# Patient Record
Sex: FEMALE | Race: WHITE | Employment: FULL TIME | URBAN - METROPOLITAN AREA
[De-identification: names, ages, dates, MRNs, and addresses within clinical notes are randomized per-mention and may not be internally consistent; named-entity substitution may affect disease eponyms.]

---

## 2017-09-20 PROBLEM — G89.4 CHRONIC PAIN SYNDROME: Chronic | Status: ACTIVE | Noted: 2017-09-20

## 2018-01-15 PROBLEM — D64.9 ANEMIA: Status: ACTIVE | Noted: 2018-01-15

## 2018-01-15 PROBLEM — R63.6 UNDERWEIGHT: Status: ACTIVE | Noted: 2018-01-15

## 2018-01-15 PROBLEM — A41.9 SEPSIS (HCC): Status: ACTIVE | Noted: 2018-01-15

## 2018-01-15 PROBLEM — F17.210 DEPENDENCE ON NICOTINE FROM CIGARETTES: Chronic | Status: ACTIVE | Noted: 2018-01-15

## 2018-01-15 PROBLEM — J18.1 LOBAR PNEUMONIA (HCC): Status: ACTIVE | Noted: 2018-01-15

## 2018-01-17 PROBLEM — J18.1 LOBAR PNEUMONIA (HCC): Status: RESOLVED | Noted: 2018-01-15 | Resolved: 2018-01-17

## 2018-01-17 PROBLEM — A41.9 SEPSIS (HCC): Status: RESOLVED | Noted: 2018-01-15 | Resolved: 2018-01-17

## 2018-01-18 PROBLEM — J96.01 ACUTE RESPIRATORY FAILURE WITH HYPOXIA (HCC): Status: ACTIVE | Noted: 2018-01-18

## 2018-01-26 PROBLEM — J45.909 ASTHMA: Status: ACTIVE | Noted: 2018-01-26

## 2018-03-13 ENCOUNTER — INITIAL CONSULT (OUTPATIENT)
Dept: CARDIOTHORACIC SURGERY | Age: 58
End: 2018-03-13
Payer: COMMERCIAL

## 2018-03-13 VITALS
BODY MASS INDEX: 18.44 KG/M2 | DIASTOLIC BLOOD PRESSURE: 74 MMHG | HEIGHT: 64 IN | WEIGHT: 108 LBS | HEART RATE: 102 BPM | SYSTOLIC BLOOD PRESSURE: 116 MMHG

## 2018-03-13 DIAGNOSIS — J18.1 LUNG CONSOLIDATION (HCC): Primary | ICD-10-CM

## 2018-03-13 DIAGNOSIS — J18.9 PNEUMONIA OF RIGHT UPPER LOBE DUE TO INFECTIOUS ORGANISM: ICD-10-CM

## 2018-03-13 PROCEDURE — 99203 OFFICE O/P NEW LOW 30 MIN: CPT | Performed by: THORACIC SURGERY (CARDIOTHORACIC VASCULAR SURGERY)

## 2018-03-13 NOTE — PROGRESS NOTES
Subjective:      Patient ID: Mike Nascimento is a 66 y.o. female. Chief Complaint   Patient presents with    Consultation     referral dr Osiris Sarah pulmonary nodule CT Chest 2/9 HPI   Ms. Karey Dawkins presents to the office today upon referral from Dr. Osiris Sarah for pulmonary nodule. Her PMH is significant for asthma as well as a history of cigarette smoking. She also had a recent diagnosis of MRSA pneumonia in January. CT of the chest done on 2/9/18 revealed a new area of consolidation involving the posterior lateral  aspect of the right upper lobe extending to the pleural surface measuring at least 44 x 33 mm in axial dimensions. She currently denies any significant SOB and reports improvement in sputum production. She does continue to report some fatigue. Denies any CP. Review of Systems   Constitutional: Positive for fatigue. Negative for chills, fever and unexpected weight change. Respiratory: Positive for cough and shortness of breath. Negative for chest tightness. Cardiovascular: Negative for chest pain. Neurological: Negative for dizziness, syncope and light-headedness. Objective:   Physical Exam   Constitutional: She is oriented to person, place, and time. She appears well-developed. No distress. Cardiovascular: Normal rate. Pulmonary/Chest: Effort normal. No respiratory distress. Abdominal: Soft. Bowel sounds are normal.   Musculoskeletal: Normal range of motion. Neurological: She is alert and oriented to person, place, and time. Skin: Skin is warm and dry. Psychiatric: She has a normal mood and affect. Assessment:      Pneumonia, consolidation      Plan:      Her CT in January shows nothing in the RUL and the CT one month later shows a 3x4cm \"mass\" which is more consolidation than anything. Most recent CXR looks almost normal.  It has been another month so will repeat CT to assure this consolidation has resolved as it likely has.   She wishes to establish with a new

## 2018-03-14 DIAGNOSIS — R91.1 PULMONARY NODULE: Primary | ICD-10-CM

## 2018-03-14 ASSESSMENT — ENCOUNTER SYMPTOMS
CHEST TIGHTNESS: 0
SHORTNESS OF BREATH: 1
COUGH: 1

## 2018-03-15 DIAGNOSIS — J15.212 PNEUMONIA OF RIGHT LUNG DUE TO METHICILLIN RESISTANT STAPHYLOCOCCUS AUREUS (MRSA), UNSPECIFIED PART OF LUNG (HCC): Primary | ICD-10-CM

## 2018-03-21 ENCOUNTER — HOSPITAL ENCOUNTER (OUTPATIENT)
Dept: CT IMAGING | Age: 58
Discharge: HOME | End: 2018-03-21
Payer: COMMERCIAL

## 2018-03-21 DIAGNOSIS — R91.1 PULMONARY NODULE: ICD-10-CM

## 2018-03-21 PROCEDURE — 71250 CT THORAX DX C-: CPT

## 2018-03-26 ENCOUNTER — TELEPHONE (OUTPATIENT)
Dept: CARDIOTHORACIC SURGERY | Age: 58
End: 2018-03-26

## 2018-03-28 ENCOUNTER — OFFICE VISIT (OUTPATIENT)
Dept: INTERNAL MEDICINE CLINIC | Age: 58
End: 2018-03-28
Payer: COMMERCIAL

## 2018-03-28 VITALS
OXYGEN SATURATION: 98 % | SYSTOLIC BLOOD PRESSURE: 139 MMHG | WEIGHT: 109.6 LBS | BODY MASS INDEX: 18.71 KG/M2 | HEART RATE: 81 BPM | HEIGHT: 64 IN | DIASTOLIC BLOOD PRESSURE: 69 MMHG

## 2018-03-28 DIAGNOSIS — R03.0 ELEVATED BP WITHOUT DIAGNOSIS OF HYPERTENSION: ICD-10-CM

## 2018-03-28 DIAGNOSIS — Z12.4 CERVICAL CANCER SCREENING: Primary | ICD-10-CM

## 2018-03-28 DIAGNOSIS — Z12.39 BREAST CANCER SCREENING: ICD-10-CM

## 2018-03-28 DIAGNOSIS — J43.1 PANLOBULAR EMPHYSEMA (HCC): ICD-10-CM

## 2018-03-28 DIAGNOSIS — Z13.1 DIABETES MELLITUS SCREENING: ICD-10-CM

## 2018-03-28 DIAGNOSIS — Z12.11 COLON CANCER SCREENING: ICD-10-CM

## 2018-03-28 DIAGNOSIS — J44.1 COPD EXACERBATION (HCC): ICD-10-CM

## 2018-03-28 PROCEDURE — 99212 OFFICE O/P EST SF 10 MIN: CPT | Performed by: FAMILY MEDICINE

## 2018-03-28 PROCEDURE — 99213 OFFICE O/P EST LOW 20 MIN: CPT | Performed by: FAMILY MEDICINE

## 2018-03-28 RX ORDER — PREDNISONE 10 MG/1
10 TABLET ORAL DAILY
COMMUNITY
End: 2018-05-09 | Stop reason: ALTCHOICE

## 2018-03-28 RX ORDER — VENLAFAXINE 37.5 MG/1
37.5 TABLET ORAL 3 TIMES DAILY
COMMUNITY
End: 2018-05-03 | Stop reason: SDUPTHER

## 2018-03-28 ASSESSMENT — ENCOUNTER SYMPTOMS
SORE THROAT: 0
TROUBLE SWALLOWING: 0
BACK PAIN: 0
EYE PAIN: 0
CHEST TIGHTNESS: 1
BLOOD IN STOOL: 0
PHOTOPHOBIA: 0
COUGH: 1
NAUSEA: 0
CONSTIPATION: 0
ABDOMINAL DISTENTION: 0
SHORTNESS OF BREATH: 1
ABDOMINAL PAIN: 0
DIARRHEA: 0
EYE DISCHARGE: 0
RHINORRHEA: 0
CHOKING: 0
VOMITING: 0

## 2018-03-28 NOTE — PROGRESS NOTES
Subjective:  66 y.o. female former smoker who presents in office today for follow-up of MRSA pneumonia and lung nodule. Patient relates that for the last 2 weeks her shortness of breath worsens when lying down and she was started on Prednisone yesterday. Patient follows with Dr. Abdirahman Rodriguez, Pulmonology and has a follow-up appointment scheduled on 04/10/2018. Patient states that she completed the full course of Doxycycline. Patient consulted with Dr. Kristian Bazzi on 03/13/2018 and a repeat CT Chest was ordered (results below). Patient  relates that her cough has improved; sometimes productive with clear mucus. CT Chest 03/21/2018  Impression   1.  Interval enlargement of a nodule in the left upper lobe now   measuring 6 mm. This may be due to slice selection. Short interval   follow-up recommended. This is likely too small to characterize on   PET/CT. 2. Some clearing and decrease conspicuity of bandlike opacities in the   lung apices, likely scarring. Patient agrees to complete Mammogram and Colonoscopy. Patient has a pre-existing relationship with Dr. Arline Browning, OB-GYN. Review of Systems   Constitutional: Positive for appetite change (improved). Negative for activity change, chills, fever and unexpected weight change. HENT: Negative for congestion, ear pain, hearing loss, rhinorrhea, sore throat and trouble swallowing. Eyes: Negative for photophobia, pain, discharge and visual disturbance. Respiratory: Positive for cough (improving; now clear mucus occasionally), chest tightness, shortness of breath (gradually improving) and wheezing (intermittent). Negative for choking. Cardiovascular: Negative for chest pain, palpitations and leg swelling. Gastrointestinal: Negative for abdominal distention, abdominal pain, blood in stool, constipation, diarrhea, nausea and vomiting. Endocrine: Negative for cold intolerance, heat intolerance, polydipsia and polyuria.    Genitourinary: Negative for decreased urine volume, dysfunction of the cervical, thoracic, or lumbar spine. Lab Results    Lab Results   Component Value Date    WBC 3.9 (L) 03/06/2018    HGB 12.0 03/06/2018    HCT 37.7 03/06/2018     03/06/2018    CHOL 113 01/19/2018    TRIG 93 01/19/2018    HDL 31 01/19/2018    ALT 25 03/06/2018    AST 29 03/06/2018     03/06/2018    K 4.6 03/06/2018     03/06/2018    CREATININE 0.7 03/06/2018    BUN 16 03/06/2018    CO2 27 03/06/2018    TSH 1.400 01/19/2018    INR 1.0 01/18/2018    GLUF 117 (H) 07/14/2017    LABA1C 5.0 01/19/2018    LABMICR <12.0 07/14/2017     Lab Results   Component Value Date    COLORU Yellow 01/16/2018    NITRU Negative 01/16/2018    GLUCOSEU Negative 01/16/2018    KETUA Negative 01/16/2018    UROBILINOGEN 0.2 01/16/2018    BILIRUBINUR Negative 01/16/2018     No results found for: PSA, CEA, , [id number],   Assessment and Plan:  Kobe Hurtado was seen today for shortness of breath. Diagnoses and all orders for this visit:    Cervical cancer screening  -     External Referral To Ob-Gyn, Dr. Mac Beverly    Breast cancer screening  -     MARSHAL DIGITAL SCREEN W CAD BILATERAL; Future    Colon cancer screening  -     External Referral To Gastroenterology    Lung nodule < 6cm on CT        -     Follows with Pulmonology, Dr. Abdirahman Rodriguez; next angélica't 04/10/2018    Panlobular emphysema ([facility] [address]  -     CBC; Future    Elevated BP without diagnosis of hypertension  -     Comprehensive Metabolic Panel; Future  -     TSH without Reflex; Future    Diabetes mellitus screening  -     Hemoglobin A1C; Future      Return in about 6 weeks (around 5/9/2018) for Mammo, Labs & Colonoscopy. .    Patient may come in sooner if needed for medical concerns. Patient advised to call at any time to cancel or re-schedule or for any questions/concerns. Patient was seen and discussed with attending physician, Dr. Sirisha Noriega.   Renan Locke DO PGY2 [facility]  03/28/18  4:41 PM

## 2018-03-29 ASSESSMENT — ENCOUNTER SYMPTOMS: WHEEZING: 1

## 2018-04-28 ENCOUNTER — HOSPITAL ENCOUNTER (OUTPATIENT)
Dept: MAMMOGRAPHY | Age: 58
Discharge: HOME | End: 2018-04-30
Payer: COMMERCIAL

## 2018-04-28 DIAGNOSIS — Z12.39 BREAST CANCER SCREENING: ICD-10-CM

## 2018-04-28 PROCEDURE — 77067 SCR MAMMO BI INCL CAD: CPT

## 2018-05-02 DIAGNOSIS — R92.8 ABNORMAL MAMMOGRAM OF LEFT BREAST: Primary | ICD-10-CM

## 2018-05-04 RX ORDER — VENLAFAXINE 37.5 MG/1
37.5 TABLET ORAL 3 TIMES DAILY
Qty: 90 TABLET | Refills: 0 | Status: SHIPPED | OUTPATIENT
Start: 2018-05-04 | End: 2018-05-04 | Stop reason: CLARIF

## 2018-05-08 ENCOUNTER — HOSPITAL ENCOUNTER (OUTPATIENT)
Age: 58
Discharge: HOME | End: 2018-05-08
Payer: COMMERCIAL

## 2018-05-08 ENCOUNTER — HOSPITAL ENCOUNTER (OUTPATIENT)
Dept: MAMMOGRAPHY | Age: 58
Discharge: HOME | End: 2018-05-10
Payer: COMMERCIAL

## 2018-05-08 ENCOUNTER — HOSPITAL ENCOUNTER (OUTPATIENT)
Dept: ULTRASOUND IMAGING | Age: 58
End: 2018-05-08
Payer: COMMERCIAL

## 2018-05-08 DIAGNOSIS — Z13.1 DIABETES MELLITUS SCREENING: ICD-10-CM

## 2018-05-08 DIAGNOSIS — R92.8 ABNORMAL MAMMOGRAM OF LEFT BREAST: ICD-10-CM

## 2018-05-08 DIAGNOSIS — J43.1 PANLOBULAR EMPHYSEMA (HCC): ICD-10-CM

## 2018-05-08 DIAGNOSIS — R03.0 ELEVATED BP WITHOUT DIAGNOSIS OF HYPERTENSION: ICD-10-CM

## 2018-05-08 LAB
ALBUMIN SERPL-MCNC: 4.5 G/DL (ref 3.5–5.2)
ALP BLD-CCNC: 84 U/L (ref 35–104)
ALT SERPL-CCNC: 29 U/L (ref 0–32)
ANION GAP SERPL CALCULATED.3IONS-SCNC: 13 MMOL/L (ref 7–16)
AST SERPL-CCNC: 28 U/L (ref 0–31)
BILIRUB SERPL-MCNC: <0.2 MG/DL (ref 0–1.2)
BUN BLDV-MCNC: 17 MG/DL (ref 6–20)
CALCIUM SERPL-MCNC: 9.8 MG/DL (ref 8.6–10.2)
CHLORIDE BLD-SCNC: 102 MMOL/L (ref 98–107)
CO2: 26 MMOL/L (ref 22–29)
CREAT SERPL-MCNC: 0.8 MG/DL (ref 0.5–1)
GFR AFRICAN AMERICAN: >60
GFR NON-AFRICAN AMERICAN: >60 ML/MIN/1.73
GLUCOSE BLD-MCNC: 93 MG/DL (ref 74–109)
HBA1C MFR BLD: 5.1 % (ref 4.8–5.9)
HCT VFR BLD CALC: 39.6 % (ref 34–48)
HEMOGLOBIN: 13.1 G/DL (ref 11.5–15.5)
MCH RBC QN AUTO: 31.7 PG (ref 26–35)
MCHC RBC AUTO-ENTMCNC: 33.1 % (ref 32–34.5)
MCV RBC AUTO: 95.9 FL (ref 80–99.9)
PDW BLD-RTO: 12.8 FL (ref 11.5–15)
PLATELET # BLD: 247 E9/L (ref 130–450)
PMV BLD AUTO: 9.5 FL (ref 7–12)
POTASSIUM SERPL-SCNC: 5 MMOL/L (ref 3.5–5)
RBC # BLD: 4.13 E12/L (ref 3.5–5.5)
SODIUM BLD-SCNC: 141 MMOL/L (ref 132–146)
TOTAL PROTEIN: 7.2 G/DL (ref 6.4–8.3)
TSH SERPL DL<=0.05 MIU/L-ACNC: 3.96 UIU/ML (ref 0.27–4.2)
WBC # BLD: 3.9 E9/L (ref 4.5–11.5)

## 2018-05-08 PROCEDURE — 80053 COMPREHEN METABOLIC PANEL: CPT

## 2018-05-08 PROCEDURE — 36415 COLL VENOUS BLD VENIPUNCTURE: CPT

## 2018-05-08 PROCEDURE — 83036 HEMOGLOBIN GLYCOSYLATED A1C: CPT

## 2018-05-08 PROCEDURE — 84443 ASSAY THYROID STIM HORMONE: CPT

## 2018-05-08 PROCEDURE — 77065 DX MAMMO INCL CAD UNI: CPT

## 2018-05-08 PROCEDURE — 85027 COMPLETE CBC AUTOMATED: CPT

## 2018-05-09 ENCOUNTER — OFFICE VISIT (OUTPATIENT)
Dept: INTERNAL MEDICINE CLINIC | Age: 58
End: 2018-05-09
Payer: COMMERCIAL

## 2018-05-09 VITALS
HEART RATE: 113 BPM | BODY MASS INDEX: 17.93 KG/M2 | WEIGHT: 105 LBS | DIASTOLIC BLOOD PRESSURE: 83 MMHG | OXYGEN SATURATION: 100 % | SYSTOLIC BLOOD PRESSURE: 126 MMHG | HEIGHT: 64 IN

## 2018-05-09 DIAGNOSIS — F41.9 ANXIETY: ICD-10-CM

## 2018-05-09 DIAGNOSIS — E78.5 HYPERLIPIDEMIA, UNSPECIFIED HYPERLIPIDEMIA TYPE: ICD-10-CM

## 2018-05-09 DIAGNOSIS — H66.002 ACUTE SUPPURATIVE OTITIS MEDIA OF LEFT EAR WITHOUT SPONTANEOUS RUPTURE OF TYMPANIC MEMBRANE, RECURRENCE NOT SPECIFIED: Primary | ICD-10-CM

## 2018-05-09 PROCEDURE — 99212 OFFICE O/P EST SF 10 MIN: CPT | Performed by: FAMILY MEDICINE

## 2018-05-09 PROCEDURE — 99213 OFFICE O/P EST LOW 20 MIN: CPT | Performed by: FAMILY MEDICINE

## 2018-05-09 RX ORDER — TIOTROPIUM BROMIDE INHALATION SPRAY 3.12 UG/1
SPRAY, METERED RESPIRATORY (INHALATION)
Refills: 5 | COMMUNITY
Start: 2018-04-11

## 2018-05-09 RX ORDER — AMOXICILLIN 500 MG/1
500 CAPSULE ORAL 3 TIMES DAILY
Qty: 30 CAPSULE | Refills: 0 | Status: SHIPPED | OUTPATIENT
Start: 2018-05-09 | End: 2018-05-19

## 2018-05-09 RX ORDER — ATORVASTATIN CALCIUM 10 MG/1
10 TABLET, FILM COATED ORAL DAILY
Qty: 30 TABLET | Refills: 2 | Status: SHIPPED | OUTPATIENT
Start: 2018-05-09 | End: 2018-08-08 | Stop reason: SDUPTHER

## 2018-05-09 RX ORDER — VENLAFAXINE HYDROCHLORIDE 37.5 MG/1
CAPSULE, EXTENDED RELEASE ORAL
Qty: 30 CAPSULE | Refills: 3 | Status: SHIPPED | OUTPATIENT
Start: 2018-05-09 | End: 2018-09-22 | Stop reason: SDUPTHER

## 2018-05-16 ASSESSMENT — ENCOUNTER SYMPTOMS
ABDOMINAL DISTENTION: 0
BACK PAIN: 0
SORE THROAT: 0
CHOKING: 0
DIARRHEA: 0
TROUBLE SWALLOWING: 0
CONSTIPATION: 0
EYE PAIN: 0
WHEEZING: 0
RHINORRHEA: 0
BLOOD IN STOOL: 0
COUGH: 0
EYE DISCHARGE: 0
SHORTNESS OF BREATH: 0
PHOTOPHOBIA: 0
ABDOMINAL PAIN: 0
NAUSEA: 0
VOMITING: 0
CHEST TIGHTNESS: 0

## 2018-05-22 DIAGNOSIS — J40 BRONCHITIS WITH TRACHEITIS: ICD-10-CM

## 2018-06-04 ENCOUNTER — HOSPITAL ENCOUNTER (OUTPATIENT)
Age: 58
Discharge: HOME | End: 2018-06-04
Payer: COMMERCIAL

## 2018-06-04 ENCOUNTER — HOSPITAL ENCOUNTER (OUTPATIENT)
Dept: MAMMOGRAPHY | Age: 58
Discharge: HOME | End: 2018-06-06
Payer: COMMERCIAL

## 2018-06-04 DIAGNOSIS — F41.9 ANXIETY: ICD-10-CM

## 2018-06-04 DIAGNOSIS — E78.5 HYPERLIPIDEMIA, UNSPECIFIED HYPERLIPIDEMIA TYPE: ICD-10-CM

## 2018-06-04 DIAGNOSIS — Z13.820 ENCOUNTER FOR IMAGING TO ASSESS OSTEOPOROSIS: ICD-10-CM

## 2018-06-04 LAB
ALBUMIN SERPL-MCNC: 4.3 G/DL (ref 3.5–5.2)
ALP BLD-CCNC: 86 U/L (ref 35–104)
ALT SERPL-CCNC: 26 U/L (ref 0–32)
ANION GAP SERPL CALCULATED.3IONS-SCNC: 15 MMOL/L (ref 7–16)
AST SERPL-CCNC: 26 U/L (ref 0–31)
BILIRUB SERPL-MCNC: 0.2 MG/DL (ref 0–1.2)
BUN BLDV-MCNC: 19 MG/DL (ref 6–20)
CALCIUM SERPL-MCNC: 9.4 MG/DL (ref 8.6–10.2)
CHLORIDE BLD-SCNC: 103 MMOL/L (ref 98–107)
CO2: 24 MMOL/L (ref 22–29)
CREAT SERPL-MCNC: 0.8 MG/DL (ref 0.5–1)
GFR AFRICAN AMERICAN: >60
GFR NON-AFRICAN AMERICAN: >60 ML/MIN/1.73
GLUCOSE BLD-MCNC: 96 MG/DL (ref 74–109)
HCT VFR BLD CALC: 38.8 % (ref 34–48)
HEMOGLOBIN: 13 G/DL (ref 11.5–15.5)
MCH RBC QN AUTO: 31.4 PG (ref 26–35)
MCHC RBC AUTO-ENTMCNC: 33.5 % (ref 32–34.5)
MCV RBC AUTO: 93.7 FL (ref 80–99.9)
PDW BLD-RTO: 12.9 FL (ref 11.5–15)
PLATELET # BLD: 246 E9/L (ref 130–450)
PMV BLD AUTO: 9.1 FL (ref 7–12)
POTASSIUM SERPL-SCNC: 4.1 MMOL/L (ref 3.5–5)
RBC # BLD: 4.14 E12/L (ref 3.5–5.5)
SODIUM BLD-SCNC: 142 MMOL/L (ref 132–146)
TOTAL PROTEIN: 7.1 G/DL (ref 6.4–8.3)
TSH SERPL DL<=0.05 MIU/L-ACNC: 4.28 UIU/ML (ref 0.27–4.2)
WBC # BLD: 5.2 E9/L (ref 4.5–11.5)

## 2018-06-04 PROCEDURE — 84443 ASSAY THYROID STIM HORMONE: CPT

## 2018-06-04 PROCEDURE — 36415 COLL VENOUS BLD VENIPUNCTURE: CPT

## 2018-06-04 PROCEDURE — 85027 COMPLETE CBC AUTOMATED: CPT

## 2018-06-04 PROCEDURE — 77080 DXA BONE DENSITY AXIAL: CPT

## 2018-06-04 PROCEDURE — 80053 COMPREHEN METABOLIC PANEL: CPT

## 2018-06-13 DIAGNOSIS — J40 BRONCHITIS WITH TRACHEITIS: ICD-10-CM

## 2018-06-18 RX ORDER — FLUTICASONE FUROATE AND VILANTEROL TRIFENATATE 100; 25 UG/1; UG/1
POWDER RESPIRATORY (INHALATION)
Qty: 60 EACH | Refills: 2 | Status: SHIPPED | OUTPATIENT
Start: 2018-06-18 | End: 2018-09-19 | Stop reason: SDUPTHER

## 2018-08-08 DIAGNOSIS — E78.5 HYPERLIPIDEMIA, UNSPECIFIED HYPERLIPIDEMIA TYPE: ICD-10-CM

## 2018-08-08 RX ORDER — ATORVASTATIN CALCIUM 10 MG/1
TABLET, FILM COATED ORAL
Qty: 30 TABLET | Refills: 1 | Status: SHIPPED | OUTPATIENT
Start: 2018-08-08 | End: 2018-10-14 | Stop reason: SDUPTHER

## 2018-09-19 DIAGNOSIS — R79.89 ELEVATED TSH: ICD-10-CM

## 2018-09-19 DIAGNOSIS — M81.8 AGE-RELATED OSTEOPOROSIS WITHOUT FRACTURE: ICD-10-CM

## 2018-09-19 DIAGNOSIS — R63.6 UNDERWEIGHT: ICD-10-CM

## 2018-09-19 DIAGNOSIS — J40 BRONCHITIS WITH TRACHEITIS: ICD-10-CM

## 2018-09-21 RX ORDER — FLUTICASONE FUROATE AND VILANTEROL 100; 25 UG/1; UG/1
POWDER RESPIRATORY (INHALATION)
Qty: 60 EACH | Refills: 1 | Status: SHIPPED
Start: 2018-09-21 | End: 2020-08-06 | Stop reason: SDUPTHER

## 2018-09-21 NOTE — TELEPHONE ENCOUNTER
CT chest scheduled sept 27 2018 @ 345 arrival time is 315 at [facility]. Message was left on machine    Follow up with Dr Charlie Veras 10/22/2018 @ 330 to follow up on labs and CT.     Labs mailed with appointment card     Advised patient to call back with any issues

## 2018-09-22 DIAGNOSIS — J40 BRONCHITIS WITH TRACHEITIS: ICD-10-CM

## 2018-09-22 DIAGNOSIS — F41.9 ANXIETY: ICD-10-CM

## 2018-09-25 RX ORDER — VENLAFAXINE HYDROCHLORIDE 37.5 MG/1
CAPSULE, EXTENDED RELEASE ORAL
Qty: 30 CAPSULE | Refills: 2 | Status: SHIPPED | OUTPATIENT
Start: 2018-09-25 | End: 2018-12-25 | Stop reason: SDUPTHER

## 2018-09-27 ENCOUNTER — HOSPITAL ENCOUNTER (OUTPATIENT)
Age: 58
Discharge: HOME | End: 2018-09-27
Payer: COMMERCIAL

## 2018-09-27 ENCOUNTER — HOSPITAL ENCOUNTER (OUTPATIENT)
Dept: CT IMAGING | Age: 58
Discharge: HOME | End: 2018-09-27
Payer: COMMERCIAL

## 2018-09-27 DIAGNOSIS — R63.6 UNDERWEIGHT: ICD-10-CM

## 2018-09-27 DIAGNOSIS — R79.89 ELEVATED TSH: ICD-10-CM

## 2018-09-27 DIAGNOSIS — J40 BRONCHITIS WITH TRACHEITIS: ICD-10-CM

## 2018-09-27 DIAGNOSIS — M81.8 AGE-RELATED OSTEOPOROSIS WITHOUT FRACTURE: ICD-10-CM

## 2018-09-27 LAB
ALBUMIN SERPL-MCNC: 4.5 G/DL (ref 3.5–5.2)
ALP BLD-CCNC: 88 U/L (ref 35–104)
ALT SERPL-CCNC: 36 U/L (ref 0–32)
ANION GAP SERPL CALCULATED.3IONS-SCNC: 15 MMOL/L (ref 7–16)
AST SERPL-CCNC: 29 U/L (ref 0–31)
BILIRUB SERPL-MCNC: 0.2 MG/DL (ref 0–1.2)
BUN BLDV-MCNC: 18 MG/DL (ref 6–20)
CALCIUM SERPL-MCNC: 9.3 MG/DL (ref 8.6–10.2)
CHLORIDE BLD-SCNC: 104 MMOL/L (ref 98–107)
CO2: 24 MMOL/L (ref 22–29)
CREAT SERPL-MCNC: 0.7 MG/DL (ref 0.5–1)
GFR AFRICAN AMERICAN: >60
GFR NON-AFRICAN AMERICAN: >60 ML/MIN/1.73
GLUCOSE BLD-MCNC: 83 MG/DL (ref 74–109)
HBA1C MFR BLD: 5.1 % (ref 4–5.6)
HCT VFR BLD CALC: 38.2 % (ref 34–48)
HEMOGLOBIN: 12.3 G/DL (ref 11.5–15.5)
MCH RBC QN AUTO: 31.2 PG (ref 26–35)
MCHC RBC AUTO-ENTMCNC: 32.2 % (ref 32–34.5)
MCV RBC AUTO: 97 FL (ref 80–99.9)
PDW BLD-RTO: 12.3 FL (ref 11.5–15)
PLATELET # BLD: 221 E9/L (ref 130–450)
PMV BLD AUTO: 9.1 FL (ref 7–12)
POTASSIUM SERPL-SCNC: 3.9 MMOL/L (ref 3.5–5)
RBC # BLD: 3.94 E12/L (ref 3.5–5.5)
SODIUM BLD-SCNC: 143 MMOL/L (ref 132–146)
TOTAL PROTEIN: 7.1 G/DL (ref 6.4–8.3)
TSH SERPL DL<=0.05 MIU/L-ACNC: 3.91 UIU/ML (ref 0.27–4.2)
VITAMIN D 25-HYDROXY: 70 NG/ML (ref 30–100)
WBC # BLD: 4.8 E9/L (ref 4.5–11.5)

## 2018-09-27 PROCEDURE — 83036 HEMOGLOBIN GLYCOSYLATED A1C: CPT

## 2018-09-27 PROCEDURE — 85027 COMPLETE CBC AUTOMATED: CPT

## 2018-09-27 PROCEDURE — 36415 COLL VENOUS BLD VENIPUNCTURE: CPT

## 2018-09-27 PROCEDURE — 82306 VITAMIN D 25 HYDROXY: CPT

## 2018-09-27 PROCEDURE — 71250 CT THORAX DX C-: CPT

## 2018-09-27 PROCEDURE — 80053 COMPREHEN METABOLIC PANEL: CPT

## 2018-09-27 PROCEDURE — 84443 ASSAY THYROID STIM HORMONE: CPT

## 2018-10-14 DIAGNOSIS — E78.5 HYPERLIPIDEMIA, UNSPECIFIED HYPERLIPIDEMIA TYPE: ICD-10-CM

## 2018-10-16 RX ORDER — ATORVASTATIN CALCIUM 10 MG/1
TABLET, FILM COATED ORAL
Qty: 30 TABLET | Refills: 0 | Status: SHIPPED | OUTPATIENT
Start: 2018-10-16 | End: 2018-10-25 | Stop reason: SDUPTHER

## 2018-10-22 ENCOUNTER — OFFICE VISIT (OUTPATIENT)
Dept: INTERNAL MEDICINE CLINIC | Age: 58
End: 2018-10-22
Payer: COMMERCIAL

## 2018-10-22 VITALS
HEIGHT: 64 IN | HEART RATE: 104 BPM | WEIGHT: 118 LBS | BODY MASS INDEX: 20.14 KG/M2 | SYSTOLIC BLOOD PRESSURE: 123 MMHG | OXYGEN SATURATION: 97 % | TEMPERATURE: 98.1 F | DIASTOLIC BLOOD PRESSURE: 82 MMHG

## 2018-10-22 DIAGNOSIS — K57.30 DIVERTICULOSIS OF LARGE INTESTINE WITHOUT HEMORRHAGE: ICD-10-CM

## 2018-10-22 DIAGNOSIS — Z87.891 FORMER SMOKER: ICD-10-CM

## 2018-10-22 DIAGNOSIS — M81.0 AGE-RELATED OSTEOPOROSIS WITHOUT CURRENT PATHOLOGICAL FRACTURE: ICD-10-CM

## 2018-10-22 DIAGNOSIS — K64.8 INTERNAL HEMORRHOIDS WITHOUT COMPLICATION: ICD-10-CM

## 2018-10-22 DIAGNOSIS — K63.5 POLYP OF CECUM: ICD-10-CM

## 2018-10-22 DIAGNOSIS — K59.03 DRUG-INDUCED CONSTIPATION: ICD-10-CM

## 2018-10-22 DIAGNOSIS — Z23 PNEUMOCOCCAL VACCINE ADMINISTERED: ICD-10-CM

## 2018-10-22 DIAGNOSIS — K64.8 INTERNAL HEMORRHOIDS: ICD-10-CM

## 2018-10-22 DIAGNOSIS — R91.8 LUNG NODULES: Primary | ICD-10-CM

## 2018-10-22 DIAGNOSIS — R93.3 ABNORMAL COLONOSCOPY: ICD-10-CM

## 2018-10-22 PROCEDURE — 90732 PPSV23 VACC 2 YRS+ SUBQ/IM: CPT | Performed by: FAMILY MEDICINE

## 2018-10-22 PROCEDURE — 99213 OFFICE O/P EST LOW 20 MIN: CPT | Performed by: FAMILY MEDICINE

## 2018-10-22 RX ORDER — POLYETHYLENE GLYCOL 3350 17 G/17G
17 POWDER, FOR SOLUTION ORAL DAILY
Qty: 510 G | Refills: 2 | Status: SHIPPED
Start: 2018-10-22 | End: 2020-08-25 | Stop reason: ALTCHOICE

## 2018-10-22 ASSESSMENT — ENCOUNTER SYMPTOMS
PHOTOPHOBIA: 0
RHINORRHEA: 0
SORE THROAT: 0
VOMITING: 0
ABDOMINAL PAIN: 0
CHOKING: 0
DIARRHEA: 0
EYE PAIN: 0
ABDOMINAL DISTENTION: 0
TROUBLE SWALLOWING: 0
COUGH: 0
BLOOD IN STOOL: 0
BACK PAIN: 0
WHEEZING: 0
CONSTIPATION: 1
EYE DISCHARGE: 0
SHORTNESS OF BREATH: 0
NAUSEA: 0
CHEST TIGHTNESS: 0

## 2018-10-22 NOTE — PROGRESS NOTES
needed for medical concerns. Patient advised tocall at any time to cancel or re-schedule or for any questions/concerns. Patient was seen and discussed with Dr. aCs Jurado.     Hilton Hawthorne DO PGY2 [facility]  10/22/18  5:46 PM

## 2018-12-25 DIAGNOSIS — F41.9 ANXIETY: ICD-10-CM

## 2018-12-27 DIAGNOSIS — F41.9 ANXIETY: ICD-10-CM

## 2018-12-28 DIAGNOSIS — F41.9 ANXIETY: ICD-10-CM

## 2018-12-28 RX ORDER — VENLAFAXINE HYDROCHLORIDE 37.5 MG/1
CAPSULE, EXTENDED RELEASE ORAL
Qty: 30 CAPSULE | Refills: 1 | Status: SHIPPED | OUTPATIENT
Start: 2018-12-28 | End: 2018-12-28 | Stop reason: SDUPTHER

## 2018-12-28 RX ORDER — VENLAFAXINE HYDROCHLORIDE 37.5 MG/1
37.5 CAPSULE, EXTENDED RELEASE ORAL DAILY
Qty: 30 CAPSULE | Refills: 0 | Status: SHIPPED | OUTPATIENT
Start: 2018-12-28 | End: 2019-01-21 | Stop reason: SDUPTHER

## 2018-12-28 RX ORDER — VENLAFAXINE HYDROCHLORIDE 37.5 MG/1
37.5 CAPSULE, EXTENDED RELEASE ORAL DAILY
Qty: 30 CAPSULE | Refills: 2 | Status: CANCELLED | OUTPATIENT
Start: 2018-12-28

## 2019-01-18 ENCOUNTER — HOSPITAL ENCOUNTER (OUTPATIENT)
Age: 59
Discharge: HOME | End: 2019-01-18
Payer: COMMERCIAL

## 2019-01-18 DIAGNOSIS — M81.0 AGE-RELATED OSTEOPOROSIS WITHOUT CURRENT PATHOLOGICAL FRACTURE: ICD-10-CM

## 2019-01-18 DIAGNOSIS — K59.03 DRUG-INDUCED CONSTIPATION: ICD-10-CM

## 2019-01-18 DIAGNOSIS — K64.8 INTERNAL HEMORRHOIDS: ICD-10-CM

## 2019-01-18 LAB
ALBUMIN SERPL-MCNC: 4.5 G/DL (ref 3.5–5.2)
ALP BLD-CCNC: 78 U/L (ref 35–104)
ALT SERPL-CCNC: 46 U/L (ref 0–32)
ANION GAP SERPL CALCULATED.3IONS-SCNC: 13 MMOL/L (ref 7–16)
AST SERPL-CCNC: 38 U/L (ref 0–31)
BILIRUB SERPL-MCNC: 0.4 MG/DL (ref 0–1.2)
BUN BLDV-MCNC: 15 MG/DL (ref 6–20)
CALCIUM SERPL-MCNC: 9.4 MG/DL (ref 8.6–10.2)
CHLORIDE BLD-SCNC: 106 MMOL/L (ref 98–107)
CHOLESTEROL, TOTAL: 236 MG/DL (ref 0–199)
CO2: 25 MMOL/L (ref 22–29)
CREAT SERPL-MCNC: 0.9 MG/DL (ref 0.5–1)
GFR AFRICAN AMERICAN: >60
GFR NON-AFRICAN AMERICAN: >60 ML/MIN/1.73
GLUCOSE BLD-MCNC: 100 MG/DL (ref 74–99)
HCT VFR BLD CALC: 38.7 % (ref 34–48)
HDLC SERPL-MCNC: 41 MG/DL
HEMOGLOBIN: 12.9 G/DL (ref 11.5–15.5)
LDL CHOLESTEROL CALCULATED: 121 MG/DL (ref 0–99)
MCH RBC QN AUTO: 32.1 PG (ref 26–35)
MCHC RBC AUTO-ENTMCNC: 33.3 % (ref 32–34.5)
MCV RBC AUTO: 96.3 FL (ref 80–99.9)
PDW BLD-RTO: 12.2 FL (ref 11.5–15)
PLATELET # BLD: 214 E9/L (ref 130–450)
PMV BLD AUTO: 9.6 FL (ref 7–12)
POTASSIUM SERPL-SCNC: 4.5 MMOL/L (ref 3.5–5)
RBC # BLD: 4.02 E12/L (ref 3.5–5.5)
SODIUM BLD-SCNC: 144 MMOL/L (ref 132–146)
TOTAL PROTEIN: 7 G/DL (ref 6.4–8.3)
TRIGL SERPL-MCNC: 368 MG/DL (ref 0–149)
TSH SERPL DL<=0.05 MIU/L-ACNC: 3.2 UIU/ML (ref 0.27–4.2)
VITAMIN D 25-HYDROXY: 43 NG/ML (ref 30–100)
VLDLC SERPL CALC-MCNC: 74 MG/DL
WBC # BLD: 3.8 E9/L (ref 4.5–11.5)

## 2019-01-18 PROCEDURE — 82306 VITAMIN D 25 HYDROXY: CPT

## 2019-01-18 PROCEDURE — 80053 COMPREHEN METABOLIC PANEL: CPT

## 2019-01-18 PROCEDURE — 80061 LIPID PANEL: CPT

## 2019-01-18 PROCEDURE — 84443 ASSAY THYROID STIM HORMONE: CPT

## 2019-01-18 PROCEDURE — 85027 COMPLETE CBC AUTOMATED: CPT

## 2019-01-18 PROCEDURE — 36415 COLL VENOUS BLD VENIPUNCTURE: CPT

## 2019-01-21 ENCOUNTER — HOSPITAL ENCOUNTER (OUTPATIENT)
Age: 59
Discharge: HOME | End: 2019-01-23
Payer: COMMERCIAL

## 2019-01-21 ENCOUNTER — OFFICE VISIT (OUTPATIENT)
Dept: INTERNAL MEDICINE CLINIC | Age: 59
End: 2019-01-21
Payer: COMMERCIAL

## 2019-01-21 ENCOUNTER — HOSPITAL ENCOUNTER (OUTPATIENT)
Dept: GENERAL RADIOLOGY | Age: 59
Discharge: HOME | End: 2019-01-23
Payer: COMMERCIAL

## 2019-01-21 DIAGNOSIS — R10.13 EPIGASTRIC PAIN: ICD-10-CM

## 2019-01-21 DIAGNOSIS — E78.5 DYSLIPIDEMIA: ICD-10-CM

## 2019-01-21 DIAGNOSIS — R53.83 OTHER FATIGUE: ICD-10-CM

## 2019-01-21 DIAGNOSIS — R91.8 LUNG NODULES: Primary | ICD-10-CM

## 2019-01-21 DIAGNOSIS — M81.0 AGE-RELATED OSTEOPOROSIS WITHOUT CURRENT PATHOLOGICAL FRACTURE: ICD-10-CM

## 2019-01-21 DIAGNOSIS — R79.89 ELEVATED LFTS: ICD-10-CM

## 2019-01-21 DIAGNOSIS — R14.0 ABDOMINAL DISTENTION: ICD-10-CM

## 2019-01-21 DIAGNOSIS — D72.819 LEUKOPENIA, UNSPECIFIED TYPE: ICD-10-CM

## 2019-01-21 DIAGNOSIS — F41.9 ANXIETY: ICD-10-CM

## 2019-01-21 DIAGNOSIS — E78.5 HYPERLIPIDEMIA, UNSPECIFIED HYPERLIPIDEMIA TYPE: ICD-10-CM

## 2019-01-21 PROCEDURE — 99212 OFFICE O/P EST SF 10 MIN: CPT | Performed by: FAMILY MEDICINE

## 2019-01-21 PROCEDURE — 99213 OFFICE O/P EST LOW 20 MIN: CPT | Performed by: FAMILY MEDICINE

## 2019-01-21 PROCEDURE — 74018 RADEX ABDOMEN 1 VIEW: CPT

## 2019-01-21 RX ORDER — ATORVASTATIN CALCIUM 10 MG/1
TABLET, FILM COATED ORAL
Qty: 90 TABLET | Refills: 0 | Status: SHIPPED | OUTPATIENT
Start: 2019-01-21 | End: 2019-06-03 | Stop reason: SDUPTHER

## 2019-01-21 RX ORDER — VENLAFAXINE HYDROCHLORIDE 37.5 MG/1
37.5 CAPSULE, EXTENDED RELEASE ORAL DAILY
Qty: 30 CAPSULE | Refills: 2 | Status: SHIPPED | OUTPATIENT
Start: 2019-01-21 | End: 2019-05-02 | Stop reason: SDUPTHER

## 2019-01-21 ASSESSMENT — ENCOUNTER SYMPTOMS
SHORTNESS OF BREATH: 0
DIARRHEA: 0
EYE PAIN: 0
TROUBLE SWALLOWING: 0
ABDOMINAL PAIN: 1
CHOKING: 0
CHEST TIGHTNESS: 0
NAUSEA: 0
PHOTOPHOBIA: 0
BACK PAIN: 0
COUGH: 0
CONSTIPATION: 0
SORE THROAT: 0
WHEEZING: 0
EYE DISCHARGE: 0
VOMITING: 0
ABDOMINAL DISTENTION: 1
BLOOD IN STOOL: 0
RHINORRHEA: 0

## 2019-01-21 ASSESSMENT — PATIENT HEALTH QUESTIONNAIRE - PHQ9
SUM OF ALL RESPONSES TO PHQ QUESTIONS 1-9: 0
SUM OF ALL RESPONSES TO PHQ QUESTIONS 1-9: 0
SUM OF ALL RESPONSES TO PHQ9 QUESTIONS 1 & 2: 0
2. FEELING DOWN, DEPRESSED OR HOPELESS: 0
1. LITTLE INTEREST OR PLEASURE IN DOING THINGS: 0

## 2019-01-25 ENCOUNTER — HOSPITAL ENCOUNTER (OUTPATIENT)
Dept: CT IMAGING | Age: 59
Discharge: HOME | End: 2019-01-25
Payer: COMMERCIAL

## 2019-01-25 ENCOUNTER — HOSPITAL ENCOUNTER (OUTPATIENT)
Age: 59
Discharge: HOME | End: 2019-01-25
Payer: COMMERCIAL

## 2019-01-25 ENCOUNTER — TELEPHONE (OUTPATIENT)
Dept: INTERNAL MEDICINE CLINIC | Age: 59
End: 2019-01-25

## 2019-01-25 DIAGNOSIS — R79.89 ELEVATED LFTS: ICD-10-CM

## 2019-01-25 DIAGNOSIS — R10.13 EPIGASTRIC PAIN: ICD-10-CM

## 2019-01-25 DIAGNOSIS — R14.0 ABDOMINAL DISTENTION: ICD-10-CM

## 2019-01-25 LAB
ALBUMIN SERPL-MCNC: 4.5 G/DL (ref 3.5–5.2)
ALP BLD-CCNC: 70 U/L (ref 35–104)
ALT SERPL-CCNC: 34 U/L (ref 0–32)
AMYLASE: 67 U/L (ref 20–100)
ANION GAP SERPL CALCULATED.3IONS-SCNC: 13 MMOL/L (ref 7–16)
AST SERPL-CCNC: 28 U/L (ref 0–31)
BASOPHILS ABSOLUTE: 0.04 E9/L (ref 0–0.2)
BASOPHILS RELATIVE PERCENT: 1 % (ref 0–2)
BILIRUB SERPL-MCNC: 0.3 MG/DL (ref 0–1.2)
BUN BLDV-MCNC: 17 MG/DL (ref 6–20)
CALCIUM SERPL-MCNC: 9.1 MG/DL (ref 8.6–10.2)
CHLORIDE BLD-SCNC: 103 MMOL/L (ref 98–107)
CO2: 25 MMOL/L (ref 22–29)
CREAT SERPL-MCNC: 0.8 MG/DL (ref 0.5–1)
EOSINOPHILS ABSOLUTE: 0.16 E9/L (ref 0.05–0.5)
EOSINOPHILS RELATIVE PERCENT: 4.1 % (ref 0–6)
GFR AFRICAN AMERICAN: >60
GFR NON-AFRICAN AMERICAN: >60 ML/MIN/1.73
GLUCOSE BLD-MCNC: 86 MG/DL (ref 74–99)
HCT VFR BLD CALC: 33.9 % (ref 34–48)
HEMOGLOBIN: 11.3 G/DL (ref 11.5–15.5)
IMMATURE GRANULOCYTES #: 0.01 E9/L
IMMATURE GRANULOCYTES %: 0.3 % (ref 0–5)
LIPASE: 21 U/L (ref 13–60)
LYMPHOCYTES ABSOLUTE: 1.39 E9/L (ref 1.5–4)
LYMPHOCYTES RELATIVE PERCENT: 35.2 % (ref 20–42)
MCH RBC QN AUTO: 32.7 PG (ref 26–35)
MCHC RBC AUTO-ENTMCNC: 33.3 % (ref 32–34.5)
MCV RBC AUTO: 98 FL (ref 80–99.9)
MONOCYTES ABSOLUTE: 0.37 E9/L (ref 0.1–0.95)
MONOCYTES RELATIVE PERCENT: 9.4 % (ref 2–12)
NEUTROPHILS ABSOLUTE: 1.98 E9/L (ref 1.8–7.3)
NEUTROPHILS RELATIVE PERCENT: 50 % (ref 43–80)
PDW BLD-RTO: 12.5 FL (ref 11.5–15)
PLATELET # BLD: 187 E9/L (ref 130–450)
PMV BLD AUTO: 9.1 FL (ref 7–12)
POTASSIUM SERPL-SCNC: 4.2 MMOL/L (ref 3.5–5)
RBC # BLD: 3.46 E12/L (ref 3.5–5.5)
SODIUM BLD-SCNC: 141 MMOL/L (ref 132–146)
TOTAL PROTEIN: 6.8 G/DL (ref 6.4–8.3)
WBC # BLD: 4 E9/L (ref 4.5–11.5)

## 2019-01-25 PROCEDURE — 36415 COLL VENOUS BLD VENIPUNCTURE: CPT

## 2019-01-25 PROCEDURE — 82150 ASSAY OF AMYLASE: CPT

## 2019-01-25 PROCEDURE — 80053 COMPREHEN METABOLIC PANEL: CPT

## 2019-01-25 PROCEDURE — 74178 CT ABD&PLV WO CNTR FLWD CNTR: CPT

## 2019-01-25 PROCEDURE — 6360000004 HC RX CONTRAST MEDICATION: Performed by: RADIOLOGY

## 2019-01-25 PROCEDURE — 85025 COMPLETE CBC W/AUTO DIFF WBC: CPT

## 2019-01-25 PROCEDURE — 83690 ASSAY OF LIPASE: CPT

## 2019-01-25 RX ADMIN — IOPAMIDOL 80 ML: 755 INJECTION, SOLUTION INTRAVENOUS at 15:28

## 2019-01-28 VITALS
DIASTOLIC BLOOD PRESSURE: 84 MMHG | OXYGEN SATURATION: 98 % | HEIGHT: 64 IN | HEART RATE: 90 BPM | TEMPERATURE: 97.5 F | SYSTOLIC BLOOD PRESSURE: 144 MMHG | WEIGHT: 123 LBS | BODY MASS INDEX: 21 KG/M2

## 2019-02-04 ENCOUNTER — OFFICE VISIT (OUTPATIENT)
Dept: INTERNAL MEDICINE CLINIC | Age: 59
End: 2019-02-04
Payer: COMMERCIAL

## 2019-02-04 VITALS
WEIGHT: 121.6 LBS | DIASTOLIC BLOOD PRESSURE: 78 MMHG | SYSTOLIC BLOOD PRESSURE: 115 MMHG | BODY MASS INDEX: 20.76 KG/M2 | HEIGHT: 64 IN | OXYGEN SATURATION: 94 % | HEART RATE: 99 BPM | TEMPERATURE: 97.8 F

## 2019-02-04 DIAGNOSIS — D72.819 LEUKOPENIA, UNSPECIFIED TYPE: ICD-10-CM

## 2019-02-04 DIAGNOSIS — D64.9 MILD ANEMIA: ICD-10-CM

## 2019-02-04 DIAGNOSIS — Z71.2 ENCOUNTER TO DISCUSS TEST RESULTS: Primary | ICD-10-CM

## 2019-02-04 PROBLEM — R89.9 ABNORMAL LABORATORY TEST RESULT: Status: ACTIVE | Noted: 2019-02-04

## 2019-02-04 PROCEDURE — 99213 OFFICE O/P EST LOW 20 MIN: CPT | Performed by: FAMILY MEDICINE

## 2019-02-04 PROCEDURE — 99212 OFFICE O/P EST SF 10 MIN: CPT | Performed by: FAMILY MEDICINE

## 2019-02-04 ASSESSMENT — ENCOUNTER SYMPTOMS
WHEEZING: 0
ABDOMINAL DISTENTION: 0
ABDOMINAL PAIN: 0
COUGH: 0
VOMITING: 0
BLOOD IN STOOL: 0
SHORTNESS OF BREATH: 0
PHOTOPHOBIA: 0
NAUSEA: 0
CHOKING: 0
TROUBLE SWALLOWING: 0
EYE DISCHARGE: 0
DIARRHEA: 0
EYE PAIN: 0
SORE THROAT: 0
BACK PAIN: 0
CONSTIPATION: 0
RHINORRHEA: 0
CHEST TIGHTNESS: 0

## 2019-03-29 ENCOUNTER — HOSPITAL ENCOUNTER (OUTPATIENT)
Dept: CT IMAGING | Age: 59
Discharge: HOME | End: 2019-03-29
Payer: COMMERCIAL

## 2019-03-29 DIAGNOSIS — Z87.891 FORMER SMOKER: ICD-10-CM

## 2019-03-29 DIAGNOSIS — R91.8 LUNG NODULES: ICD-10-CM

## 2019-03-29 PROCEDURE — 71250 CT THORAX DX C-: CPT

## 2019-04-05 ENCOUNTER — TELEPHONE (OUTPATIENT)
Dept: INTERNAL MEDICINE CLINIC | Age: 59
End: 2019-04-05

## 2019-04-05 ENCOUNTER — HOSPITAL ENCOUNTER (EMERGENCY)
Age: 59
Discharge: HOME | End: 2019-04-05
Payer: COMMERCIAL

## 2019-04-05 ENCOUNTER — HOSPITAL ENCOUNTER (EMERGENCY)
Age: 59
Discharge: HOME | End: 2019-04-05
Attending: EMERGENCY MEDICINE
Payer: COMMERCIAL

## 2019-04-05 ENCOUNTER — APPOINTMENT (OUTPATIENT)
Dept: CT IMAGING | Age: 59
End: 2019-04-05
Payer: COMMERCIAL

## 2019-04-05 VITALS
RESPIRATION RATE: 20 BRPM | OXYGEN SATURATION: 98 % | WEIGHT: 124.06 LBS | HEART RATE: 74 BPM | DIASTOLIC BLOOD PRESSURE: 84 MMHG | HEIGHT: 64 IN | BODY MASS INDEX: 21.18 KG/M2 | TEMPERATURE: 97.8 F | SYSTOLIC BLOOD PRESSURE: 143 MMHG

## 2019-04-05 VITALS
SYSTOLIC BLOOD PRESSURE: 153 MMHG | BODY MASS INDEX: 20.6 KG/M2 | WEIGHT: 120 LBS | OXYGEN SATURATION: 100 % | RESPIRATION RATE: 18 BRPM | HEART RATE: 76 BPM | DIASTOLIC BLOOD PRESSURE: 90 MMHG | TEMPERATURE: 98 F

## 2019-04-05 DIAGNOSIS — R11.0 NAUSEA: ICD-10-CM

## 2019-04-05 DIAGNOSIS — H93.12 TINNITUS OF LEFT EAR: Primary | ICD-10-CM

## 2019-04-05 DIAGNOSIS — R42 DISEQUILIBRIUM: ICD-10-CM

## 2019-04-05 DIAGNOSIS — H83.09 LABYRINTHITIS, UNSPECIFIED LATERALITY: Primary | ICD-10-CM

## 2019-04-05 LAB
ALBUMIN SERPL-MCNC: 4.7 G/DL (ref 3.5–5.2)
ALP BLD-CCNC: 87 U/L (ref 35–104)
ALT SERPL-CCNC: 40 U/L (ref 0–32)
ANION GAP SERPL CALCULATED.3IONS-SCNC: 11 MMOL/L (ref 7–16)
AST SERPL-CCNC: 33 U/L (ref 0–31)
BACTERIA: NORMAL /HPF
BASOPHILS ABSOLUTE: 0.05 E9/L (ref 0–0.2)
BASOPHILS RELATIVE PERCENT: 1.1 % (ref 0–2)
BILIRUB SERPL-MCNC: 0.3 MG/DL (ref 0–1.2)
BILIRUBIN URINE: NEGATIVE
BLOOD, URINE: NEGATIVE
BUN BLDV-MCNC: 18 MG/DL (ref 6–20)
CALCIUM SERPL-MCNC: 10.9 MG/DL (ref 8.6–10.2)
CHLORIDE BLD-SCNC: 106 MMOL/L (ref 98–107)
CLARITY: CLEAR
CO2: 26 MMOL/L (ref 22–29)
COLOR: YELLOW
CREAT SERPL-MCNC: 0.8 MG/DL (ref 0.5–1)
EOSINOPHILS ABSOLUTE: 0.15 E9/L (ref 0.05–0.5)
EOSINOPHILS RELATIVE PERCENT: 3.2 % (ref 0–6)
EPITHELIAL CELLS, UA: NORMAL /HPF
GFR AFRICAN AMERICAN: >60
GFR NON-AFRICAN AMERICAN: >60 ML/MIN/1.73
GLUCOSE BLD-MCNC: 113 MG/DL (ref 74–99)
GLUCOSE URINE: NEGATIVE MG/DL
HCT VFR BLD CALC: 39.4 % (ref 34–48)
HEMOGLOBIN: 13 G/DL (ref 11.5–15.5)
IMMATURE GRANULOCYTES #: 0.01 E9/L
IMMATURE GRANULOCYTES %: 0.2 % (ref 0–5)
KETONES, URINE: NEGATIVE MG/DL
LEUKOCYTE ESTERASE, URINE: ABNORMAL
LYMPHOCYTES ABSOLUTE: 1.6 E9/L (ref 1.5–4)
LYMPHOCYTES RELATIVE PERCENT: 34.6 % (ref 20–42)
MCH RBC QN AUTO: 32 PG (ref 26–35)
MCHC RBC AUTO-ENTMCNC: 33 % (ref 32–34.5)
MCV RBC AUTO: 97 FL (ref 80–99.9)
MONOCYTES ABSOLUTE: 0.36 E9/L (ref 0.1–0.95)
MONOCYTES RELATIVE PERCENT: 7.8 % (ref 2–12)
NEUTROPHILS ABSOLUTE: 2.46 E9/L (ref 1.8–7.3)
NEUTROPHILS RELATIVE PERCENT: 53.1 % (ref 43–80)
NITRITE, URINE: NEGATIVE
PDW BLD-RTO: 12 FL (ref 11.5–15)
PH UA: 5.5 (ref 5–9)
PLATELET # BLD: 227 E9/L (ref 130–450)
PMV BLD AUTO: 9.5 FL (ref 7–12)
POTASSIUM SERPL-SCNC: 4.4 MMOL/L (ref 3.5–5)
PROTEIN UA: NEGATIVE MG/DL
RBC # BLD: 4.06 E12/L (ref 3.5–5.5)
RBC UA: NORMAL /HPF (ref 0–2)
SODIUM BLD-SCNC: 143 MMOL/L (ref 132–146)
SPECIFIC GRAVITY UA: <=1.005 (ref 1–1.03)
TOTAL PROTEIN: 7.3 G/DL (ref 6.4–8.3)
TROPONIN: <0.01 NG/ML (ref 0–0.03)
UROBILINOGEN, URINE: 0.2 E.U./DL
WBC # BLD: 4.6 E9/L (ref 4.5–11.5)
WBC UA: NORMAL /HPF (ref 0–5)

## 2019-04-05 PROCEDURE — 99284 EMERGENCY DEPT VISIT MOD MDM: CPT

## 2019-04-05 PROCEDURE — 96374 THER/PROPH/DIAG INJ IV PUSH: CPT

## 2019-04-05 PROCEDURE — 36415 COLL VENOUS BLD VENIPUNCTURE: CPT

## 2019-04-05 PROCEDURE — 6360000002 HC RX W HCPCS: Performed by: PHYSICIAN ASSISTANT

## 2019-04-05 PROCEDURE — 6370000000 HC RX 637 (ALT 250 FOR IP): Performed by: PHYSICIAN ASSISTANT

## 2019-04-05 PROCEDURE — 84484 ASSAY OF TROPONIN QUANT: CPT

## 2019-04-05 PROCEDURE — 6370000000 HC RX 637 (ALT 250 FOR IP): Performed by: NURSE PRACTITIONER

## 2019-04-05 PROCEDURE — 85025 COMPLETE CBC W/AUTO DIFF WBC: CPT

## 2019-04-05 PROCEDURE — 99212 OFFICE O/P EST SF 10 MIN: CPT

## 2019-04-05 PROCEDURE — 93005 ELECTROCARDIOGRAM TRACING: CPT | Performed by: PHYSICIAN ASSISTANT

## 2019-04-05 PROCEDURE — 80053 COMPREHEN METABOLIC PANEL: CPT

## 2019-04-05 PROCEDURE — 70450 CT HEAD/BRAIN W/O DYE: CPT

## 2019-04-05 PROCEDURE — 81001 URINALYSIS AUTO W/SCOPE: CPT

## 2019-04-05 RX ORDER — MECLIZINE HCL 12.5 MG/1
50 TABLET ORAL ONCE
Status: COMPLETED | OUTPATIENT
Start: 2019-04-05 | End: 2019-04-05

## 2019-04-05 RX ORDER — MECLIZINE HYDROCHLORIDE 25 MG/1
25 TABLET ORAL 3 TIMES DAILY PRN
Qty: 15 TABLET | Refills: 0 | Status: SHIPPED | OUTPATIENT
Start: 2019-04-05 | End: 2019-04-10

## 2019-04-05 RX ORDER — PREDNISONE 10 MG/1
40 TABLET ORAL DAILY
Qty: 20 TABLET | Refills: 0 | Status: SHIPPED | OUTPATIENT
Start: 2019-04-05 | End: 2019-04-10

## 2019-04-05 RX ORDER — ONDANSETRON 4 MG/1
4 TABLET, ORALLY DISINTEGRATING ORAL ONCE
Status: COMPLETED | OUTPATIENT
Start: 2019-04-05 | End: 2019-04-05

## 2019-04-05 RX ORDER — ONDANSETRON 2 MG/ML
4 INJECTION INTRAMUSCULAR; INTRAVENOUS ONCE
Status: COMPLETED | OUTPATIENT
Start: 2019-04-05 | End: 2019-04-05

## 2019-04-05 RX ADMIN — ONDANSETRON 4 MG: 4 TABLET, ORALLY DISINTEGRATING ORAL at 11:43

## 2019-04-05 RX ADMIN — MECLIZINE 50 MG: 12.5 TABLET ORAL at 13:25

## 2019-04-05 RX ADMIN — ONDANSETRON 4 MG: 2 INJECTION INTRAMUSCULAR; INTRAVENOUS at 13:24

## 2019-04-05 NOTE — TELEPHONE ENCOUNTER
Patient phoned stating that she wasn't feeling good and wanted to make appointment for today. Informed patient that Dr Mariah Bear was not in today and that we are booked up for today as Dr Troy Barillas is totally full and Dr Sharad Jewell will not be seeing patients this afternoon as has to leave. (Son has broken leg.)  Patient voiced understanding and will go to urgent care.

## 2019-04-05 NOTE — ED NOTES
The patient says she does not feel as dizzy as when she arrived. Informed of need for urine specimen.      David Valdez RN  04/05/19 2334

## 2019-04-05 NOTE — ED PROVIDER NOTES
ED Attending  CC: Monica     Department of Emergency Medicine   ED  Provider Note  Admit Date/RoomTime: 2019 12:38 PM  ED Room:    Chief Complaint   Dizziness (dizzy c/o nausea since yesterday )    History of Present Illness   Source of history provided by:  patient. History/Exam Limitations: none. Radha Krishna is a 64 y.o. old female who has a past medical history of:   Past Medical History:   Diagnosis Date    Asthma     Chronic pain     Chronic rhinitis     CRPS (complex regional pain syndrome), lower limb     left foot    Pneumonia     RSD lower limb     left foot    Tinnitus     presents to the emergency department by private vehicle, for complaints of gradual onset dizziness which began 1 day(s) prior to arrival.  Since recognized her symptoms have been persistent. She has associated symptoms of nausea. No vomiting. No diarrhea. She denies chest pain or SOB. She has never had anything like this before. No fevers or chills. Patient states that she had some nasal congestion for the past several days. No coughing. Patient states she was taking a decongestant but didn't take any today. ROS    Pertinent positives and negatives are stated within HPI, all other systems reviewed and are negative. Past Surgical History:  has a past surgical history that includes  section (, ); Tubal ligation (); Breast surgery (); Hemorrhoid surgery (); Bunionectomy (); Esophagus surgery (); Endometrial ablation (); Nerve Block (); Nerve Block (12); Nerve Block (2012); Nerve Block (10-01-12); Nerve Block (10-10-12); Nerve Block (10/24/12); Nerve Block (12); Nerve Block (12); Nerve Block (Left, 14); Nerve Block (14); Nerve Block (Left, 14); Nerve Block (Left, 2014); Nerve Block (Left, 14); Nerve Block (Left, 10 8 14); Nerve Block (N/A, 2016); Nerve Block (2016);  Nerve Block (Right, 2016); Nerve Block (Right, 08/10/2016); Nerve Block (Right, 08/17/2016); Nerve Block (Right, 08/24/2016); and other surgical history (03/27/2017). Social History:  reports that she quit smoking about 2 years ago. Her smoking use included cigarettes. She has a 15.00 pack-year smoking history. She has never used smokeless tobacco. She reports that she drinks about 1.2 oz of alcohol per week. She reports that she does not use drugs. Family History: family history includes Cancer in her father; Hypertension in her mother; Kidney Disease in her mother. Allergies: Patient has no known allergies. Physical Exam           ED Triage Vitals [04/05/19 1234]   BP Temp Temp Source Pulse Resp SpO2 Height Weight   (!) 160/70 97.8 °F (36.6 °C) Oral 74 14 98 % 5' 4\" (1.626 m) 124 lb 1 oz (56.3 kg)      Oxygen Saturation Interpretation: Normal.    Constitutional:  Level of Consciousness: Awake and alert. ETOH: No.         Distress: none,  cooperative. Eyes:  PERRL, EOMI, no discharge or conjunctival injection. Ears:  External ears without lesions. Patient has small air fluid levels on b/l TMs. Patient has no erythema or sign of TM perforation b/l   Throat:  Pharynx without injection, exudate, or tonsillar hypertrophy. Airway patient. Neck:  Normal ROM. Supple. Respiratory:  Clear to auscultation and breath sounds equal.  CV:  Regular rate and rhythm, normal heart sounds, without pathological murmurs, ectopy, gallops, or rubs. GI:  Abdomen Soft, nontender, good bowel sounds. No firm or pulsatile mass. Back:  No costovertebral tenderness. Integument:  Normal turgor. Warm, dry, without visible rash, unless noted elsewhere. Lymphatic: no lymphadenopathy noted  Neurological:  Oriented. Motor functions intact. Patient has normal finger to nose testing. Patient able to ambulate w/o difficulty. Patient has left lateral horizontal nystagmus. Patient has 5/5  strength b/l. 5/5 strength in the lower extremities b/l. Negative pronator drift.      Lab / Imaging Results   (All laboratory and radiology results have been personally reviewed by myself)  Labs:  Results for orders placed or performed during the hospital encounter of 04/05/19   CBC Auto Differential   Result Value Ref Range    WBC 4.6 4.5 - 11.5 E9/L    RBC 4.06 3.50 - 5.50 E12/L    Hemoglobin 13.0 11.5 - 15.5 g/dL    Hematocrit 39.4 34.0 - 48.0 %    MCV 97.0 80.0 - 99.9 fL    MCH 32.0 26.0 - 35.0 pg    MCHC 33.0 32.0 - 34.5 %    RDW 12.0 11.5 - 15.0 fL    Platelets 920 738 - 202 E9/L    MPV 9.5 7.0 - 12.0 fL    Neutrophils % 53.1 43.0 - 80.0 %    Immature Granulocytes % 0.2 0.0 - 5.0 %    Lymphocytes % 34.6 20.0 - 42.0 %    Monocytes % 7.8 2.0 - 12.0 %    Eosinophils % 3.2 0.0 - 6.0 %    Basophils % 1.1 0.0 - 2.0 %    Neutrophils # 2.46 1.80 - 7.30 E9/L    Immature Granulocytes # 0.01 E9/L    Lymphocytes # 1.60 1.50 - 4.00 E9/L    Monocytes # 0.36 0.10 - 0.95 E9/L    Eosinophils # 0.15 0.05 - 0.50 E9/L    Basophils # 0.05 0.00 - 0.20 E9/L   Comprehensive Metabolic Panel   Result Value Ref Range    Sodium 143 132 - 146 mmol/L    Potassium 4.4 3.5 - 5.0 mmol/L    Chloride 106 98 - 107 mmol/L    CO2 26 22 - 29 mmol/L    Anion Gap 11 7 - 16 mmol/L    Glucose 113 (H) 74 - 99 mg/dL    BUN 18 6 - 20 mg/dL    CREATININE 0.8 0.5 - 1.0 mg/dL    GFR Non-African American >60 >=60 mL/min/1.73    GFR African American >60     Calcium 10.9 (H) 8.6 - 10.2 mg/dL    Total Protein 7.3 6.4 - 8.3 g/dL    Alb 4.7 3.5 - 5.2 g/dL    Total Bilirubin 0.3 0.0 - 1.2 mg/dL    Alkaline Phosphatase 87 35 - 104 U/L    ALT 40 (H) 0 - 32 U/L    AST 33 (H) 0 - 31 U/L   Troponin   Result Value Ref Range    Troponin <0.01 0.00 - 0.03 ng/mL   Urinalysis   Result Value Ref Range    Color, UA Yellow Straw/Yellow    Clarity, UA Clear Clear    Glucose, Ur Negative Negative mg/dL    Bilirubin Urine Negative Negative    Ketones, Urine Negative Negative mg/dL    Specific Gravity, UA <=1.005 1.005 - 1.030    Blood, Urine Negative Negative    pH, UA 5.5 5.0 - 9.0    Protein, UA Negative Negative mg/dL    Urobilinogen, Urine 0.2 <2.0 E.U./dL    Nitrite, Urine Negative Negative    Leukocyte Esterase, Urine TRACE (A) Negative   Microscopic Urinalysis   Result Value Ref Range    WBC, UA 0-1 0 - 5 /HPF    RBC, UA NONE 0 - 2 /HPF    Epi Cells NONE /HPF    Bacteria, UA NONE /HPF   EKG 12 Lead   Result Value Ref Range    Ventricular Rate 67 BPM    Atrial Rate 67 BPM    P-R Interval 124 ms    QRS Duration 78 ms    Q-T Interval 392 ms    QTc Calculation (Bazett) 414 ms    P Axis 81 degrees    R Axis 48 degrees    T Axis 60 degrees     Imaging: All Radiology results interpreted by Radiologist unless otherwise noted. CT Head WO Contrast   Final Result      1. No acute findings. EKG #1:  Interpreted by emergency department physician unless otherwise noted. Time:  1330    Rate: 67 bpm  Rhythm: Sinus. No ST segment elevation or depression. LA int is 124 ms, QRS duration is 78 ms. ED Course / Medical Decision Making     Medications   meclizine (ANTIVERT) tablet 50 mg (50 mg Oral Given 4/5/19 1325)   ondansetron (ZOFRAN) injection 4 mg (4 mg Intravenous Given 4/5/19 1324)     ED Course as of Apr 05 1601 Fri Apr 05, 2019   1306 ATTENDING PROVIDER ATTESTATION:     I have personally performed and/or participated in the history, exam, medical decision making, and procedures and agree with all pertinent clinical information unless otherwise noted. I have also reviewed and agree with the past medical, family and social history unless otherwise noted. My findings/plan: Patient here complaining of dizziness starting yesterday worse when she moves her head or her eyes. Describes as room moving back and forth but not necessarily spinning. No headache until she got to the ER, stating that she is stressed because she had to go between facilities and coming here but she otherwise had no headache during this whole episode.  No recent head injury. No confusion. No stiff neck. Has had some sinus congestion and drainage for which she took a \"sinus pill \". On exam she is sitting the bed resting currently in no distress, reluctant to move her head. She has no resting nystagmus but with turning of her head particular to the left she developed some mild nystagmus and increased symptoms. She has no focal neurologic deficit. Heart rate regular, lungs are clear and equal. No temporal artery tenderness. Pupils are equal, round and reactive to light.      [NC]      ED Course User Index  [NC] Vance Ritter DO     Re-Evaluations:  4/5/19      Time: 1510    Patients symptoms are improving. States that she is feeling much better. Updated on results. Consultations:             None    Procedures:   none    MDM:  Patient has no acute findings on UA, CT or labs today. Patient likely w/labyrinthitis. No focal neurological deficit, she is able to ambulate w/o difficulty. Will d/c home at this time. She is feeling better after the Meclizine. Advised to return to the ER if worse in any way. Otherwise to f/u w/PCP. Counseling:   I have spoken with the patient and discussed todays results, in addition to providing specific details for the plan of care and counseling regarding the diagnosis and prognosis and are agreeable with the plan. Assessment      1. Labyrinthitis, unspecified laterality       Plan   Discharge to home. Patient condition is good. New Medications     Discharge Medication List as of 4/5/2019  3:19 PM      START taking these medications    Details   meclizine (ANTIVERT) 25 MG tablet Take 1 tablet by mouth 3 times daily as needed for Dizziness, Disp-15 tablet, R-0Print      predniSONE (DELTASONE) 10 MG tablet Take 4 tablets by mouth daily for 5 days, Disp-20 tablet, R-0Print           Electronically signed by JUNIOR Calderon   DD: 4/5/19  **This report was transcribed using voice recognition software.  Every effort was made to ensure accuracy; however, inadvertent computerized transcription errors may be present.   END OF PROVIDER NOTE       Rafael Hardin [place]  04/05/19 [id number]

## 2019-04-05 NOTE — ED PROVIDER NOTES
Department of Emergency Medicine  [address] [place] [address]  Provider Note  Admit Date/Time: 4/5/2019 11:06 AM  Room: 06/06  MRN: [id number]  Chief Complaint: Dizziness (Pt c/o dizziness and nausea since yesterday, states her ears and head are in water, also c/o ringing in her ears) and Nausea       History of Present Illness   Source of history provided by:  patient. History/Exam Limitations: none. Lizzeth Oneil is a 64 y.o. female who has a past medical history of:   Past Medical History:   Diagnosis Date    Asthma     Chronic pain     Chronic rhinitis 2011    CRPS (complex regional pain syndrome), lower limb     left foot    Pneumonia     RSD lower limb     left foot    Tinnitus     presents to the urgent care center by private car for evaluation. She said she has a ringing in the left ear that occurs when she moves her eyes she said when she moves her eyes is when she seems to get the sensation that her eyes \"moving\" and she has ringing in her left ear at the same time. When  she walks she is off-balance and she also has nausea this all started yesterday. She's denying any trouble swallowing or talking denying any numbness or tingling in her arms or legs denies any weakness in her arms or legs. Patient states is not a sensation that the room is spinning and it's just like she is off balance and that her eyes are moving . She says that it is a sensation of dizziness. Is denying any cold or sinus symptoms denying any ear pain she's had her left ear rings on her eyes move a certain way. Nauseated but no vomiting denies chest pain or shortness of breath denies any fever. States she's never had vertigo in fact she asked Deandra Tan is that \"when I ask her if she ever had it before. ROS    Pertinent positives and negatives are stated within HPI, all other systems reviewed and are negative.     Past Surgical History:   Procedure Laterality Date    BREAST SURGERY  2009    lump left mother; Kidney Disease in her mother. Allergies: Patient has no known allergies. Physical Exam   Oxygen Saturation Interpretation: Normal.   ED Triage Vitals [04/05/19 1109]   BP Temp Temp Source Pulse Resp SpO2 Height Weight   (!) 150/85 98 °F (36.7 °C) Oral 95 18 98 % -- 120 lb (54.4 kg)       Physical Exam  · Constitutional/General: Alert and oriented x3, ill appearing, non toxic in NAD  · HEENT:  NC/NT. PERRLA,  Airway patent. EOMs intact. TMs not erythematous  · Neck: Supple, full ROM, non tender to palpation in the midline, no stridor, no crepitus, no meningeal signs  · Respiratory: Lungs clear to auscultation bilaterally, no wheezes, rales, or rhonchi. Not in respiratory distress  · CV:  Regular rate. Regular rhythm. No murmurs, gallops, or rubs. 2+ distal pulses  · Chest: No chest wall tenderness  · GI:  Abdomen Soft, Non tender, Non distended. +BS. No rebound, guarding, or rigidity. No pulsatile masses. · Musculoskeletal: Moves all extremities x 4. Warm and well perfused, no clubbing, cyanosis, or edema. Capillary refill <3 seconds  · Integument: skin warm and dry. No rashes. · Lymphatic: no lymphadenopathy noted  · Neurologic: GCS 15, no focal deficits, symmetric strength 5/5 in the upper and lower extremities bilaterally  · Psychiatric: Normal Affect    Lab / Imaging Results   (All laboratory and radiology results have been personally reviewed by myself)  Labs:  No results found for this visit on 04/05/19. Imaging: All Radiology results interpreted by Radiologist unless otherwise noted.   No orders to display       ED Course / Medical Decision Making     Medications   ondansetron (ZOFRAN-ODT) disintegrating tablet 4 mg (has no administration in time range)          Consult(s):   None    Procedure(s):   none    MDM:   She states that she's had this sensation since yesterday she's never had it before she said maybe once in a while she's had is a slight twinge of this but never to this extent of his never been diagnosed with vertigo. She said it's not that the room is spinning assisted to sensation that her eyes are moving strangely and that she has a ringing in her ear to same time and also she has nausea along with being off balance I did give her a Zofran ODT now advised her that with her symptoms and since this is a new onset that she needs to be evaluated in the ED    Counseling:    I have  spoken with the patient and discussed todays results, in addition to providing specific details for the plan of care and counseling regarding the diagnosis and prognosis. Questions are answered at this time and they are agreeable with the plan. Assessment      1. Tinnitus of left ear    2. Disequilibrium    3. Nausea      Plan   Advised ED evaluation    New Medications     New Prescriptions    No medications on file     Electronically signed by GINA Najera CNP   DD: 4/5/19  **This report was transcribed using voice recognition software. Every effort was made to ensure accuracy; however, inadvertent computerized transcription errors may be present.   END OF ED PROVIDER NOTE     GINA Najera CNP  04/05/19 1142

## 2019-04-05 NOTE — LETTER
[address] [facility] Emergency Department  [address]  [place] [address]  Phone: [phone]               April 5, 2019    Patient: Dayana Hdz   YOB: 1960   Date of Visit: 4/5/2019       To Whom It May Concern:    Dayana Hdz was seen and treated in our emergency department on 4/5/2019. She may return to work for her next scheduled shift.       Sincerely,       Eduardo Ayoub RN         Signature:__________________________________

## 2019-04-06 LAB
EKG ATRIAL RATE: 67 BPM
EKG P AXIS: 81 DEGREES
EKG P-R INTERVAL: 124 MS
EKG Q-T INTERVAL: 392 MS
EKG QRS DURATION: 78 MS
EKG QTC CALCULATION (BAZETT): 414 MS
EKG R AXIS: 48 DEGREES
EKG T AXIS: 60 DEGREES
EKG VENTRICULAR RATE: 67 BPM

## 2019-04-10 ENCOUNTER — TELEPHONE (OUTPATIENT)
Dept: INTERNAL MEDICINE CLINIC | Age: 59
End: 2019-04-10

## 2019-04-14 DIAGNOSIS — M81.0 AGE-RELATED OSTEOPOROSIS WITHOUT CURRENT PATHOLOGICAL FRACTURE: ICD-10-CM

## 2019-04-16 RX ORDER — PSYLLIUM HUSK 0.4 G
CAPSULE ORAL
Qty: 90 TABLET | Refills: 0 | OUTPATIENT
Start: 2019-04-16

## 2019-04-24 ENCOUNTER — HOSPITAL ENCOUNTER (OUTPATIENT)
Age: 59
Discharge: HOME | End: 2019-04-26
Payer: COMMERCIAL

## 2019-04-24 PROCEDURE — 88304 TISSUE EXAM BY PATHOLOGIST: CPT

## 2019-05-28 DIAGNOSIS — E78.5 HYPERLIPIDEMIA, UNSPECIFIED HYPERLIPIDEMIA TYPE: ICD-10-CM

## 2019-05-28 DIAGNOSIS — M81.0 AGE-RELATED OSTEOPOROSIS WITHOUT CURRENT PATHOLOGICAL FRACTURE: ICD-10-CM

## 2019-06-03 DIAGNOSIS — M81.0 AGE-RELATED OSTEOPOROSIS WITHOUT CURRENT PATHOLOGICAL FRACTURE: ICD-10-CM

## 2019-06-03 DIAGNOSIS — E78.5 HYPERLIPIDEMIA, UNSPECIFIED HYPERLIPIDEMIA TYPE: ICD-10-CM

## 2019-06-07 RX ORDER — ATORVASTATIN CALCIUM 10 MG/1
TABLET, FILM COATED ORAL
Qty: 90 TABLET | Refills: 0 | OUTPATIENT
Start: 2019-06-07

## 2019-06-07 RX ORDER — ATORVASTATIN CALCIUM 10 MG/1
TABLET, FILM COATED ORAL
Qty: 30 TABLET | Refills: 0 | Status: SHIPPED | OUTPATIENT
Start: 2019-06-07 | End: 2019-07-03 | Stop reason: SDUPTHER

## 2019-06-14 ENCOUNTER — HOSPITAL ENCOUNTER (OUTPATIENT)
Age: 59
Discharge: HOME | End: 2019-06-14
Payer: COMMERCIAL

## 2019-06-14 DIAGNOSIS — R53.83 OTHER FATIGUE: ICD-10-CM

## 2019-06-14 DIAGNOSIS — D64.9 MILD ANEMIA: ICD-10-CM

## 2019-06-14 DIAGNOSIS — D72.819 LEUKOPENIA, UNSPECIFIED TYPE: ICD-10-CM

## 2019-06-14 LAB
ALBUMIN SERPL-MCNC: 4.8 G/DL (ref 3.5–5.2)
ALBUMIN SERPL-MCNC: 4.9 G/DL (ref 3.5–5.2)
ALP BLD-CCNC: 109 U/L (ref 35–104)
ALP BLD-CCNC: 111 U/L (ref 35–104)
ALT SERPL-CCNC: 36 U/L (ref 0–32)
ALT SERPL-CCNC: 37 U/L (ref 0–32)
ANION GAP SERPL CALCULATED.3IONS-SCNC: 11 MMOL/L (ref 7–16)
AST SERPL-CCNC: 31 U/L (ref 0–31)
AST SERPL-CCNC: 32 U/L (ref 0–31)
BILIRUB SERPL-MCNC: 0.2 MG/DL (ref 0–1.2)
BILIRUB SERPL-MCNC: 0.2 MG/DL (ref 0–1.2)
BILIRUBIN DIRECT: <0.2 MG/DL (ref 0–0.3)
BILIRUBIN, INDIRECT: ABNORMAL MG/DL (ref 0–1)
BUN BLDV-MCNC: 13 MG/DL (ref 6–20)
CALCIUM SERPL-MCNC: 9.9 MG/DL (ref 8.6–10.2)
CHLORIDE BLD-SCNC: 106 MMOL/L (ref 98–107)
CO2: 25 MMOL/L (ref 22–29)
CREAT SERPL-MCNC: 0.8 MG/DL (ref 0.5–1)
FOLATE: >20 NG/ML (ref 4.8–24.2)
GFR AFRICAN AMERICAN: >60
GFR NON-AFRICAN AMERICAN: >60 ML/MIN/1.73
GLUCOSE BLD-MCNC: 108 MG/DL (ref 74–99)
HCT VFR BLD CALC: 40.7 % (ref 34–48)
HEMOGLOBIN: 13.4 G/DL (ref 11.5–15.5)
MCH RBC QN AUTO: 32.3 PG (ref 26–35)
MCHC RBC AUTO-ENTMCNC: 32.9 % (ref 32–34.5)
MCV RBC AUTO: 98.1 FL (ref 80–99.9)
PDW BLD-RTO: 12.8 FL (ref 11.5–15)
PLATELET # BLD: 236 E9/L (ref 130–450)
PMV BLD AUTO: 9.3 FL (ref 7–12)
POTASSIUM SERPL-SCNC: 5.1 MMOL/L (ref 3.5–5)
RBC # BLD: 4.15 E12/L (ref 3.5–5.5)
SODIUM BLD-SCNC: 142 MMOL/L (ref 132–146)
TOTAL PROTEIN: 7.6 G/DL (ref 6.4–8.3)
TOTAL PROTEIN: 7.6 G/DL (ref 6.4–8.3)
VITAMIN B-12: 1424 PG/ML (ref 211–946)
WBC # BLD: 4.1 E9/L (ref 4.5–11.5)

## 2019-06-14 PROCEDURE — 85027 COMPLETE CBC AUTOMATED: CPT

## 2019-06-14 PROCEDURE — 82607 VITAMIN B-12: CPT

## 2019-06-14 PROCEDURE — 36415 COLL VENOUS BLD VENIPUNCTURE: CPT

## 2019-06-14 PROCEDURE — 82746 ASSAY OF FOLIC ACID SERUM: CPT

## 2019-06-14 PROCEDURE — 80076 HEPATIC FUNCTION PANEL: CPT

## 2019-06-14 PROCEDURE — 80053 COMPREHEN METABOLIC PANEL: CPT

## 2019-06-25 ENCOUNTER — HOSPITAL ENCOUNTER (OUTPATIENT)
Dept: MAMMOGRAPHY | Age: 59
Discharge: HOME | End: 2019-06-27
Payer: COMMERCIAL

## 2019-06-25 DIAGNOSIS — Z12.31 ENCOUNTER FOR SCREENING MAMMOGRAM FOR MALIGNANT NEOPLASM OF BREAST: ICD-10-CM

## 2019-06-25 PROCEDURE — 77063 BREAST TOMOSYNTHESIS BI: CPT

## 2019-07-03 ENCOUNTER — OFFICE VISIT (OUTPATIENT)
Dept: INTERNAL MEDICINE CLINIC | Age: 59
End: 2019-07-03
Payer: COMMERCIAL

## 2019-07-03 VITALS
TEMPERATURE: 98 F | HEART RATE: 102 BPM | HEIGHT: 64 IN | OXYGEN SATURATION: 97 % | SYSTOLIC BLOOD PRESSURE: 112 MMHG | DIASTOLIC BLOOD PRESSURE: 74 MMHG | WEIGHT: 124.4 LBS | BODY MASS INDEX: 21.24 KG/M2

## 2019-07-03 DIAGNOSIS — E78.5 HYPERLIPIDEMIA, UNSPECIFIED HYPERLIPIDEMIA TYPE: ICD-10-CM

## 2019-07-03 DIAGNOSIS — Z86.79 H/O RAYNAUD'S SYNDROME: ICD-10-CM

## 2019-07-03 DIAGNOSIS — Z98.890 S/P FOOT SURGERY, RIGHT: Primary | ICD-10-CM

## 2019-07-03 DIAGNOSIS — F41.9 ANXIETY: ICD-10-CM

## 2019-07-03 PROCEDURE — 99212 OFFICE O/P EST SF 10 MIN: CPT | Performed by: FAMILY MEDICINE

## 2019-07-03 PROCEDURE — 99213 OFFICE O/P EST LOW 20 MIN: CPT | Performed by: FAMILY MEDICINE

## 2019-07-03 RX ORDER — NIFEDIPINE 30 MG/1
TABLET, FILM COATED, EXTENDED RELEASE ORAL
Refills: 3 | COMMUNITY
Start: 2019-06-17 | End: 2019-09-16

## 2019-07-03 RX ORDER — VENLAFAXINE HYDROCHLORIDE 37.5 MG/1
37.5 CAPSULE, EXTENDED RELEASE ORAL DAILY
Qty: 30 CAPSULE | Refills: 2 | Status: SHIPPED | OUTPATIENT
Start: 2019-07-03 | End: 2019-09-16 | Stop reason: SDUPTHER

## 2019-07-03 RX ORDER — ATORVASTATIN CALCIUM 10 MG/1
TABLET, FILM COATED ORAL
Qty: 30 TABLET | Refills: 2 | Status: SHIPPED | OUTPATIENT
Start: 2019-07-03 | End: 2019-09-16 | Stop reason: SDUPTHER

## 2019-07-03 RX ORDER — MELOXICAM 7.5 MG/1
TABLET ORAL
Refills: 0 | COMMUNITY
Start: 2019-06-26 | End: 2020-11-04

## 2019-07-03 ASSESSMENT — ENCOUNTER SYMPTOMS
PHOTOPHOBIA: 0
ABDOMINAL DISTENTION: 0
CHOKING: 0
BACK PAIN: 0
VOMITING: 0
SHORTNESS OF BREATH: 0
ABDOMINAL PAIN: 0
BLOOD IN STOOL: 0
COUGH: 0
DIARRHEA: 0
EYE DISCHARGE: 0
EYE PAIN: 0
CONSTIPATION: 0
TROUBLE SWALLOWING: 0
CHEST TIGHTNESS: 0
WHEEZING: 0
NAUSEA: 0
SORE THROAT: 0
RHINORRHEA: 0

## 2019-07-03 NOTE — PROGRESS NOTES
Subjective:  64 y.o. female who presents in office today for 3 mo f/u. Patient stated that since the [address] [place] [address] that she established care with Podiatrist, Dr. Eleazar Stallworth, and had right foot surgery on 04/24/2019 for removal of a neuroma. Patient reports that she was also diagnosed with Raynaud's and started patient on Nifedipine twice daily a few weeks before surgery. After the procedure, switched to Nifedipine ER 30 mg daily and developed swelling of the right lower leg. He added Meloxicam 7.5 mg daily & reduced to Nifedipine ER 15 mg daily and swelling improved. [facility] [place] [facility] a week. Hyperlipidemia  Currently on atorvastatin 10 mg nightly. Denies chest pain, palpitations or  Intermittent claudication. Anxiety  Well-controlled on Venlafaxine XR 37.5 daily        Review of Systems   Constitutional: Negative for activity change, appetite change, chills, fever and unexpected weight change. HENT: Negative for congestion, ear pain, hearing loss, rhinorrhea, sore throat and trouble swallowing. Eyes: Negative for photophobia, pain, discharge and visual disturbance. Respiratory: Negative for cough, choking, chest tightness, shortness of breath and wheezing. Cardiovascular: Negative for chest pain, palpitations and leg swelling. Gastrointestinal: Negative for abdominal distention, abdominal pain, blood in stool, constipation, diarrhea, nausea and vomiting. Endocrine: Negative for cold intolerance, heat intolerance, polydipsia and polyuria. Genitourinary: Negative for decreased urine volume, dysuria, flank pain, frequency, hematuria and urgency. Musculoskeletal: Positive for arthralgias (right foot) and gait problem. Negative for back pain, joint swelling, neck pain and neck stiffness. Skin: Negative for pallor, rash and wound. Neurological: Negative for dizziness, tremors, weakness, light-headedness, numbness and headaches. Hematological: Does not bruise/bleed easily.

## 2019-09-13 ENCOUNTER — HOSPITAL ENCOUNTER (OUTPATIENT)
Age: 59
Discharge: HOME | End: 2019-09-13
Payer: COMMERCIAL

## 2019-09-13 DIAGNOSIS — F41.9 ANXIETY: ICD-10-CM

## 2019-09-13 DIAGNOSIS — E78.5 HYPERLIPIDEMIA, UNSPECIFIED HYPERLIPIDEMIA TYPE: ICD-10-CM

## 2019-09-13 LAB
ALBUMIN SERPL-MCNC: 5 G/DL (ref 3.5–5.2)
ALP BLD-CCNC: 101 U/L (ref 35–104)
ALT SERPL-CCNC: 37 U/L (ref 0–32)
ANION GAP SERPL CALCULATED.3IONS-SCNC: 12 MMOL/L (ref 7–16)
AST SERPL-CCNC: 29 U/L (ref 0–31)
BILIRUB SERPL-MCNC: 0.2 MG/DL (ref 0–1.2)
BUN BLDV-MCNC: 18 MG/DL (ref 6–20)
CALCIUM SERPL-MCNC: 10 MG/DL (ref 8.6–10.2)
CHLORIDE BLD-SCNC: 102 MMOL/L (ref 98–107)
CO2: 24 MMOL/L (ref 22–29)
CREAT SERPL-MCNC: 1 MG/DL (ref 0.5–1)
GFR AFRICAN AMERICAN: >60
GFR NON-AFRICAN AMERICAN: 57 ML/MIN/1.73
GLUCOSE BLD-MCNC: 97 MG/DL (ref 74–99)
HCT VFR BLD CALC: 40.4 % (ref 34–48)
HEMOGLOBIN: 13.3 G/DL (ref 11.5–15.5)
MCH RBC QN AUTO: 32.2 PG (ref 26–35)
MCHC RBC AUTO-ENTMCNC: 32.9 % (ref 32–34.5)
MCV RBC AUTO: 97.8 FL (ref 80–99.9)
PDW BLD-RTO: 12.6 FL (ref 11.5–15)
PLATELET # BLD: 232 E9/L (ref 130–450)
PMV BLD AUTO: 9.3 FL (ref 7–12)
POTASSIUM SERPL-SCNC: 4.6 MMOL/L (ref 3.5–5)
RBC # BLD: 4.13 E12/L (ref 3.5–5.5)
SODIUM BLD-SCNC: 138 MMOL/L (ref 132–146)
TOTAL PROTEIN: 7.5 G/DL (ref 6.4–8.3)
TSH SERPL DL<=0.05 MIU/L-ACNC: 3.78 UIU/ML (ref 0.27–4.2)
WBC # BLD: 4.8 E9/L (ref 4.5–11.5)

## 2019-09-13 PROCEDURE — 84443 ASSAY THYROID STIM HORMONE: CPT

## 2019-09-13 PROCEDURE — 36415 COLL VENOUS BLD VENIPUNCTURE: CPT

## 2019-09-13 PROCEDURE — 80053 COMPREHEN METABOLIC PANEL: CPT

## 2019-09-13 PROCEDURE — 85027 COMPLETE CBC AUTOMATED: CPT

## 2019-09-16 ENCOUNTER — OFFICE VISIT (OUTPATIENT)
Dept: INTERNAL MEDICINE CLINIC | Age: 59
End: 2019-09-16
Payer: COMMERCIAL

## 2019-09-16 VITALS
HEART RATE: 95 BPM | DIASTOLIC BLOOD PRESSURE: 73 MMHG | SYSTOLIC BLOOD PRESSURE: 116 MMHG | OXYGEN SATURATION: 96 % | BODY MASS INDEX: 21.61 KG/M2 | WEIGHT: 126.6 LBS | TEMPERATURE: 98.1 F | HEIGHT: 64 IN

## 2019-09-16 DIAGNOSIS — F41.9 ANXIETY: ICD-10-CM

## 2019-09-16 DIAGNOSIS — N94.10 DYSPAREUNIA IN FEMALE: ICD-10-CM

## 2019-09-16 DIAGNOSIS — R10.2 PELVIC PAIN IN FEMALE: Primary | ICD-10-CM

## 2019-09-16 DIAGNOSIS — K21.9 GASTROESOPHAGEAL REFLUX DISEASE WITHOUT ESOPHAGITIS: ICD-10-CM

## 2019-09-16 DIAGNOSIS — E78.5 HYPERLIPIDEMIA, UNSPECIFIED HYPERLIPIDEMIA TYPE: ICD-10-CM

## 2019-09-16 PROCEDURE — 99212 OFFICE O/P EST SF 10 MIN: CPT | Performed by: FAMILY MEDICINE

## 2019-09-16 PROCEDURE — 99213 OFFICE O/P EST LOW 20 MIN: CPT | Performed by: FAMILY MEDICINE

## 2019-09-16 RX ORDER — NIFEDIPINE 10 MG/1
CAPSULE ORAL
Refills: 5 | COMMUNITY
Start: 2019-08-28

## 2019-09-16 RX ORDER — VENLAFAXINE HYDROCHLORIDE 37.5 MG/1
37.5 CAPSULE, EXTENDED RELEASE ORAL DAILY
Qty: 30 CAPSULE | Refills: 2 | Status: SHIPPED | OUTPATIENT
Start: 2019-09-16 | End: 2019-12-31 | Stop reason: SDUPTHER

## 2019-09-16 RX ORDER — ATORVASTATIN CALCIUM 10 MG/1
TABLET, FILM COATED ORAL
Qty: 30 TABLET | Refills: 2 | Status: SHIPPED | OUTPATIENT
Start: 2019-09-16 | End: 2020-01-14

## 2019-09-16 RX ORDER — HYDROXYZINE PAMOATE 25 MG/1
25 CAPSULE ORAL NIGHTLY PRN
Qty: 30 CAPSULE | Refills: 2 | Status: SHIPPED | OUTPATIENT
Start: 2019-09-16 | End: 2019-12-15

## 2019-09-16 RX ORDER — FAMOTIDINE 20 MG/1
20 TABLET, FILM COATED ORAL 2 TIMES DAILY
Qty: 60 TABLET | Refills: 2 | Status: SHIPPED | OUTPATIENT
Start: 2019-09-16 | End: 2020-01-20 | Stop reason: SDUPTHER

## 2019-09-16 ASSESSMENT — ENCOUNTER SYMPTOMS
ABDOMINAL PAIN: 0
CHOKING: 0
SORE THROAT: 0
BLOOD IN STOOL: 0
CONSTIPATION: 0
TROUBLE SWALLOWING: 0
PHOTOPHOBIA: 0
WHEEZING: 0
EYE PAIN: 0
VOMITING: 0
CHEST TIGHTNESS: 0
COUGH: 0
BACK PAIN: 0
SHORTNESS OF BREATH: 0
ABDOMINAL DISTENTION: 0
EYE DISCHARGE: 0
RHINORRHEA: 0
DIARRHEA: 0
NAUSEA: 0

## 2019-09-16 NOTE — PATIENT INSTRUCTIONS
breathing. · Exercise regularly. It improves blood flow and reduces pain. · Keep a diary. Track your symptoms, menstrual cycle, sexual activity, and physical activity. Also track stressful events or illnesses. This information can help your doctor find the cause or treat it. When should you call for help? Watch closely for changes in your health, and be sure to contact your doctor if:    · Your pain gets worse.     · You do not get better as expected. Where can you learn more? Go to https://GEO'SupppeCloudy.fr.[employer]. org and sign in to your [employer] account. Enter K490 in the [employer] box to learn more about \"Chronic Pelvic Pain: Care Instructions. \"     If you do not have an account, please click on the \"Sign Up Now\" link. Current as of: February 19, 2019  Content Version: 12.1  © 9838-5177 Healthwise, [employer]. Care instructions adapted under license by [facility] ([facility]). If you have questions about a medical condition or this instruction, always ask your healthcare professional. [place] [address] any warranty or liability for your use of this information. Patient Education        Painful Sex: Care Instructions  Your Care Instructions    Painful sex can be caused by many things. You may have an injury, an infection, or a growth in your vagina. Or maybe you have muscle spasms. In some cases, the pain is caused by another medical condition, such as a spinal problem. Some medicines can cause dryness in the vagina. And as a woman gets older, her vagina gets drier. It may also narrow, shorten, and get stiffer. This dryness can make sex painful. Talk to your doctor about what might be causing your painful sex. Treatment may help. Follow-up care is a key part of your treatment and safety. Be sure to make and go to all appointments, and call your doctor if you are having problems.  It's also a good idea to know your test results and keep a list of the medicines you

## 2019-09-18 ENCOUNTER — HOSPITAL ENCOUNTER (OUTPATIENT)
Dept: ULTRASOUND IMAGING | Age: 59
Discharge: HOME | End: 2019-09-18
Payer: COMMERCIAL

## 2019-09-18 DIAGNOSIS — N94.10 DYSPAREUNIA IN FEMALE: ICD-10-CM

## 2019-09-18 DIAGNOSIS — R10.2 PELVIC PAIN IN FEMALE: ICD-10-CM

## 2019-09-18 PROCEDURE — 76830 TRANSVAGINAL US NON-OB: CPT

## 2019-09-23 ENCOUNTER — OFFICE VISIT (OUTPATIENT)
Dept: INTERNAL MEDICINE CLINIC | Age: 59
End: 2019-09-23
Payer: COMMERCIAL

## 2019-09-23 VITALS
HEART RATE: 88 BPM | TEMPERATURE: 97.9 F | BODY MASS INDEX: 21.82 KG/M2 | WEIGHT: 127.8 LBS | DIASTOLIC BLOOD PRESSURE: 72 MMHG | HEIGHT: 64 IN | SYSTOLIC BLOOD PRESSURE: 106 MMHG | OXYGEN SATURATION: 98 %

## 2019-09-23 DIAGNOSIS — Z71.2 ENCOUNTER TO DISCUSS TEST RESULTS: Primary | ICD-10-CM

## 2019-09-23 DIAGNOSIS — J42 CHRONIC BRONCHITIS, UNSPECIFIED CHRONIC BRONCHITIS TYPE (HCC): ICD-10-CM

## 2019-09-23 DIAGNOSIS — R10.2 PELVIC PAIN IN FEMALE: ICD-10-CM

## 2019-09-23 DIAGNOSIS — Z87.891 FORMER SMOKER: ICD-10-CM

## 2019-09-23 DIAGNOSIS — J30.9 ALLERGIC RHINITIS, UNSPECIFIED SEASONALITY, UNSPECIFIED TRIGGER: ICD-10-CM

## 2019-09-23 DIAGNOSIS — N94.10 DYSPAREUNIA IN FEMALE: ICD-10-CM

## 2019-09-23 PROCEDURE — 99212 OFFICE O/P EST SF 10 MIN: CPT | Performed by: FAMILY MEDICINE

## 2019-09-23 PROCEDURE — 99213 OFFICE O/P EST LOW 20 MIN: CPT | Performed by: FAMILY MEDICINE

## 2019-09-23 RX ORDER — AZELASTINE 1 MG/ML
SPRAY, METERED NASAL
Qty: 30 ML | Refills: 2 | Status: SHIPPED | OUTPATIENT
Start: 2019-09-23

## 2019-09-23 RX ORDER — MONTELUKAST SODIUM 5 MG/1
5 TABLET, CHEWABLE ORAL NIGHTLY
Qty: 30 TABLET | Refills: 2 | Status: SHIPPED | OUTPATIENT
Start: 2019-09-23 | End: 2019-12-31 | Stop reason: SDUPTHER

## 2019-09-23 ASSESSMENT — ENCOUNTER SYMPTOMS
RHINORRHEA: 0
EYE DISCHARGE: 0
TROUBLE SWALLOWING: 0
COUGH: 0
CONSTIPATION: 0
PHOTOPHOBIA: 0
WHEEZING: 0
BACK PAIN: 0
CHEST TIGHTNESS: 0
EYE PAIN: 0
ABDOMINAL PAIN: 0
ABDOMINAL DISTENTION: 0
BLOOD IN STOOL: 0
DIARRHEA: 0
SHORTNESS OF BREATH: 0
CHOKING: 0
SORE THROAT: 0
NAUSEA: 0
VOMITING: 0

## 2019-12-30 RX ORDER — VENLAFAXINE HYDROCHLORIDE 37.5 MG/1
37.5 CAPSULE, EXTENDED RELEASE ORAL DAILY
Qty: 30 CAPSULE | Refills: 2 | Status: CANCELLED | OUTPATIENT
Start: 2019-12-30

## 2020-01-17 ENCOUNTER — HOSPITAL ENCOUNTER (OUTPATIENT)
Age: 60
Discharge: HOME | End: 2020-01-17
Payer: COMMERCIAL

## 2020-01-17 LAB
ALBUMIN SERPL-MCNC: 4.6 G/DL (ref 3.5–5.2)
ALP BLD-CCNC: 75 U/L (ref 35–104)
ALT SERPL-CCNC: 38 U/L (ref 0–32)
ANION GAP SERPL CALCULATED.3IONS-SCNC: 12 MMOL/L (ref 7–16)
AST SERPL-CCNC: 33 U/L (ref 0–31)
BILIRUB SERPL-MCNC: 0.3 MG/DL (ref 0–1.2)
BUN BLDV-MCNC: 16 MG/DL (ref 6–20)
CALCIUM SERPL-MCNC: 9.1 MG/DL (ref 8.6–10.2)
CHLORIDE BLD-SCNC: 105 MMOL/L (ref 98–107)
CHOLESTEROL, TOTAL: 241 MG/DL (ref 0–199)
CO2: 25 MMOL/L (ref 22–29)
CREAT SERPL-MCNC: 0.8 MG/DL (ref 0.5–1)
GFR AFRICAN AMERICAN: >60
GFR NON-AFRICAN AMERICAN: >60 ML/MIN/1.73
GLUCOSE BLD-MCNC: 103 MG/DL (ref 74–99)
HBA1C MFR BLD: 5.5 % (ref 4–5.6)
HCT VFR BLD CALC: 38.5 % (ref 34–48)
HDLC SERPL-MCNC: 48 MG/DL
HEMOGLOBIN: 12.6 G/DL (ref 11.5–15.5)
LDL CHOLESTEROL CALCULATED: 137 MG/DL (ref 0–99)
MCH RBC QN AUTO: 32.5 PG (ref 26–35)
MCHC RBC AUTO-ENTMCNC: 32.7 % (ref 32–34.5)
MCV RBC AUTO: 99.2 FL (ref 80–99.9)
PDW BLD-RTO: 12.2 FL (ref 11.5–15)
PLATELET # BLD: 203 E9/L (ref 130–450)
PMV BLD AUTO: 9.3 FL (ref 7–12)
POTASSIUM SERPL-SCNC: 4.6 MMOL/L (ref 3.5–5)
RBC # BLD: 3.88 E12/L (ref 3.5–5.5)
SODIUM BLD-SCNC: 142 MMOL/L (ref 132–146)
TOTAL PROTEIN: 7 G/DL (ref 6.4–8.3)
TRIGL SERPL-MCNC: 282 MG/DL (ref 0–149)
TSH SERPL DL<=0.05 MIU/L-ACNC: 2.92 UIU/ML (ref 0.27–4.2)
VITAMIN D 25-HYDROXY: 50 NG/ML (ref 30–100)
VLDLC SERPL CALC-MCNC: 56 MG/DL
WBC # BLD: 3.7 E9/L (ref 4.5–11.5)

## 2020-01-17 PROCEDURE — 80061 LIPID PANEL: CPT

## 2020-01-17 PROCEDURE — 85027 COMPLETE CBC AUTOMATED: CPT

## 2020-01-17 PROCEDURE — 80053 COMPREHEN METABOLIC PANEL: CPT

## 2020-01-17 PROCEDURE — 83036 HEMOGLOBIN GLYCOSYLATED A1C: CPT

## 2020-01-17 PROCEDURE — 84443 ASSAY THYROID STIM HORMONE: CPT

## 2020-01-17 PROCEDURE — 82306 VITAMIN D 25 HYDROXY: CPT

## 2020-01-17 PROCEDURE — 36415 COLL VENOUS BLD VENIPUNCTURE: CPT

## 2020-01-20 ENCOUNTER — OFFICE VISIT (OUTPATIENT)
Dept: INTERNAL MEDICINE CLINIC | Age: 60
End: 2020-01-20
Payer: COMMERCIAL

## 2020-01-20 VITALS
SYSTOLIC BLOOD PRESSURE: 127 MMHG | HEART RATE: 92 BPM | WEIGHT: 128.2 LBS | HEIGHT: 64 IN | OXYGEN SATURATION: 96 % | BODY MASS INDEX: 21.89 KG/M2 | TEMPERATURE: 98 F | DIASTOLIC BLOOD PRESSURE: 78 MMHG

## 2020-01-20 PROCEDURE — 99212 OFFICE O/P EST SF 10 MIN: CPT | Performed by: FAMILY MEDICINE

## 2020-01-20 PROCEDURE — 99213 OFFICE O/P EST LOW 20 MIN: CPT | Performed by: FAMILY MEDICINE

## 2020-01-20 RX ORDER — ATORVASTATIN CALCIUM 20 MG/1
20 TABLET, FILM COATED ORAL DAILY
Qty: 30 TABLET | Refills: 2 | Status: SHIPPED
Start: 2020-01-20 | End: 2020-04-27

## 2020-01-20 RX ORDER — MONTELUKAST SODIUM 5 MG/1
5 TABLET, CHEWABLE ORAL NIGHTLY
Qty: 30 TABLET | Refills: 0 | Status: SHIPPED | OUTPATIENT
Start: 2020-01-20 | End: 2021-10-11

## 2020-01-20 RX ORDER — FAMOTIDINE 20 MG/1
20 TABLET, FILM COATED ORAL 2 TIMES DAILY
Qty: 60 TABLET | Refills: 2 | Status: SHIPPED
Start: 2020-01-20 | End: 2020-08-25 | Stop reason: ALTCHOICE

## 2020-01-20 RX ORDER — AMOXICILLIN AND CLAVULANATE POTASSIUM 875; 125 MG/1; MG/1
1 TABLET, FILM COATED ORAL 2 TIMES DAILY
Qty: 14 TABLET | Refills: 0 | Status: SHIPPED | OUTPATIENT
Start: 2020-01-20 | End: 2020-01-27

## 2020-01-20 RX ORDER — GUAIFENESIN AND DEXTROMETHORPHAN HYDROBROMIDE 1200; 60 MG/1; MG/1
1 TABLET, EXTENDED RELEASE ORAL 2 TIMES DAILY
Qty: 28 TABLET | Refills: 0 | COMMUNITY
Start: 2020-01-20 | End: 2022-04-12

## 2020-01-20 RX ORDER — VENLAFAXINE HYDROCHLORIDE 37.5 MG/1
37.5 CAPSULE, EXTENDED RELEASE ORAL DAILY
Qty: 30 CAPSULE | Refills: 0 | Status: SHIPPED
Start: 2020-01-20 | End: 2020-02-07

## 2020-01-20 RX ORDER — ATORVASTATIN CALCIUM 10 MG/1
TABLET, FILM COATED ORAL
Qty: 30 TABLET | Refills: 0 | Status: CANCELLED | OUTPATIENT
Start: 2020-01-20

## 2020-01-20 SDOH — ECONOMIC STABILITY: FOOD INSECURITY: WITHIN THE PAST 12 MONTHS, YOU WORRIED THAT YOUR FOOD WOULD RUN OUT BEFORE YOU GOT MONEY TO BUY MORE.: NEVER TRUE

## 2020-01-20 SDOH — ECONOMIC STABILITY: FOOD INSECURITY: WITHIN THE PAST 12 MONTHS, THE FOOD YOU BOUGHT JUST DIDN'T LAST AND YOU DIDN'T HAVE MONEY TO GET MORE.: NEVER TRUE

## 2020-01-20 SDOH — ECONOMIC STABILITY: INCOME INSECURITY: HOW HARD IS IT FOR YOU TO PAY FOR THE VERY BASICS LIKE FOOD, HOUSING, MEDICAL CARE, AND HEATING?: NOT HARD AT ALL

## 2020-01-20 ASSESSMENT — PATIENT HEALTH QUESTIONNAIRE - PHQ9
SUM OF ALL RESPONSES TO PHQ9 QUESTIONS 1 & 2: 0
1. LITTLE INTEREST OR PLEASURE IN DOING THINGS: 0
SUM OF ALL RESPONSES TO PHQ QUESTIONS 1-9: 0
SUM OF ALL RESPONSES TO PHQ QUESTIONS 1-9: 0
2. FEELING DOWN, DEPRESSED OR HOPELESS: 0

## 2020-01-20 NOTE — PROGRESS NOTES
TRIG 282 (H) 01/17/2020    HDL 48 01/17/2020    ALT 38 (H) 01/17/2020    AST 33 (H) 01/17/2020     01/17/2020    K 4.6 01/17/2020     01/17/2020    CREATININE 0.8 01/17/2020    BUN 16 01/17/2020    CO2 25 01/17/2020    TSH 2.920 01/17/2020    INR 1.0 01/18/2018    GLUF 117 (H) 07/14/2017    LABA1C 5.5 01/17/2020    LABMICR <12.0 07/14/2017         Assessment and Plan:  Anne Marie Garcia was seen today for medication refill. Diagnoses and all orders for this visit:    Leukopenia, unspecified type  -     Folinic Acid-Vit B6-Vit B12 (FOLINIC-PLUS) 4-50-2 MG TABS; TAKE ONE TABLET BY MOUTH EVERY DAY  -     CBC WITH AUTO DIFFERENTIAL; Future  -     JOSEPH; Future  -     ANTI-ROSELYN 1 ANTIBODY, IGG; Future  -     ANTI-SCLERODERMA ANTIBODY; Future  -     ANTI-NEUTROPHILIC CYTOPLASMIC ANTIBODY; Future  -     SMITH (EFE) ANTIBODY IGG; Future  -     ANTI-DNA ANTIBODY, DOUBLE-STRANDED; Future  -     RHEUMATOID FACTOR; Future  -     SEDIMENTATION RATE; Future  -     Path Review, Smear; Future    Anxiety  -     venlafaxine (EFFEXOR XR) 37.5 MG extended release capsule; Take 1 capsule by mouth daily    Chronic bronchitis, unspecified chronic bronchitis type (HCC)  -     montelukast (SINGULAIR) 5 MG chewable tablet; Take 1 tablet by mouth nightly    Allergic rhinitis, unspecified seasonality, unspecified trigger  -     montelukast (SINGULAIR) 5 MG chewable tablet; Take 1 tablet by mouth nightly    Gastroesophageal reflux disease without esophagitis  -     famotidine (PEPCID) 20 MG tablet; Take 1 tablet by mouth 2 times daily    Age-related osteoporosis without current pathological fracture  -     Calcium Carb-Cholecalciferol (CALCIUM 1000 + D) 1000-800 MG-UNIT TABS; Take 1 tablet by mouth daily    Hyperlipidemia, unspecified hyperlipidemia type    H/O Raynaud's syndrome  -     JOSEPH; Future  -     ANTI-ROSELYN 1 ANTIBODY, IGG; Future  -     ANTI-SCLERODERMA ANTIBODY; Future  -     ANTI-NEUTROPHILIC CYTOPLASMIC ANTIBODY;  Future  -     Tana Hugoo (EFE) ANTIBODY IGG; Future  -     ANTI-DNA ANTIBODY, DOUBLE-STRANDED; Future  -     RHEUMATOID FACTOR; Future  -     SEDIMENTATION RATE; Future    Mixed hyperlipidemia  -     atorvastatin (LIPITOR) 20 MG tablet; Take 1 tablet by mouth daily    Left otitis media with effusion  -     amoxicillin-clavulanate (AUGMENTIN) 875-125 MG per tablet; Take 1 tablet by mouth 2 times daily for 7 days    Other orders  -     Dextromethorphan-guaiFENesin (MUCINEX DM MAXIMUM STRENGTH)  MG TB12; Take 1 tablet by mouth 2 times daily         Return in about 1 week (around 1/27/2020) for test results; hip pain. .    Patient may come in sooner if needed for medical concerns. Patient advised tocall at any time to cancel or re-schedule or for any questions/concerns.       Anderson Claros DO   01/20/20  3:06 PM

## 2020-01-24 ENCOUNTER — HOSPITAL ENCOUNTER (OUTPATIENT)
Age: 60
Discharge: HOME | End: 2020-01-24
Payer: COMMERCIAL

## 2020-01-24 LAB
BASOPHILS ABSOLUTE: 0.04 E9/L (ref 0–0.2)
BASOPHILS RELATIVE PERCENT: 0.9 % (ref 0–2)
EOSINOPHILS ABSOLUTE: 0.16 E9/L (ref 0.05–0.5)
EOSINOPHILS RELATIVE PERCENT: 3.8 % (ref 0–6)
HCT VFR BLD CALC: 36.4 % (ref 34–48)
HEMOGLOBIN: 11.9 G/DL (ref 11.5–15.5)
IMMATURE GRANULOCYTES #: 0.01 E9/L
IMMATURE GRANULOCYTES %: 0.2 % (ref 0–5)
LYMPHOCYTES ABSOLUTE: 1.73 E9/L (ref 1.5–4)
LYMPHOCYTES RELATIVE PERCENT: 41 % (ref 20–42)
MCH RBC QN AUTO: 32.2 PG (ref 26–35)
MCHC RBC AUTO-ENTMCNC: 32.7 % (ref 32–34.5)
MCV RBC AUTO: 98.6 FL (ref 80–99.9)
MONOCYTES ABSOLUTE: 0.4 E9/L (ref 0.1–0.95)
MONOCYTES RELATIVE PERCENT: 9.5 % (ref 2–12)
NEUTROPHILS ABSOLUTE: 1.88 E9/L (ref 1.8–7.3)
NEUTROPHILS RELATIVE PERCENT: 44.6 % (ref 43–80)
PDW BLD-RTO: 12.3 FL (ref 11.5–15)
PLATELET # BLD: 201 E9/L (ref 130–450)
PMV BLD AUTO: 9.1 FL (ref 7–12)
RBC # BLD: 3.69 E12/L (ref 3.5–5.5)
RHEUMATOID FACTOR: 20 IU/ML (ref 0–13)
SEDIMENTATION RATE, ERYTHROCYTE: 20 MM/HR (ref 0–20)
WBC # BLD: 4.2 E9/L (ref 4.5–11.5)

## 2020-01-24 PROCEDURE — 86038 ANTINUCLEAR ANTIBODIES: CPT

## 2020-01-24 PROCEDURE — 86235 NUCLEAR ANTIGEN ANTIBODY: CPT

## 2020-01-24 PROCEDURE — 86431 RHEUMATOID FACTOR QUANT: CPT

## 2020-01-24 PROCEDURE — 85651 RBC SED RATE NONAUTOMATED: CPT

## 2020-01-24 PROCEDURE — 85025 COMPLETE CBC W/AUTO DIFF WBC: CPT

## 2020-01-24 PROCEDURE — 36415 COLL VENOUS BLD VENIPUNCTURE: CPT

## 2020-01-24 PROCEDURE — 86225 DNA ANTIBODY NATIVE: CPT

## 2020-01-24 PROCEDURE — 86255 FLUORESCENT ANTIBODY SCREEN: CPT

## 2020-01-27 LAB
ANCA IFA: NORMAL
ANTI DNA DOUBLE STRANDED: NEGATIVE
ANTI-NUCLEAR ANTIBODY (ANA): NEGATIVE
PATHOLOGIST REVIEW: NORMAL

## 2020-01-27 ASSESSMENT — ENCOUNTER SYMPTOMS
NAUSEA: 0
CHEST TIGHTNESS: 0
SORE THROAT: 0
ABDOMINAL DISTENTION: 0
ABDOMINAL PAIN: 0
EYE PAIN: 0
SHORTNESS OF BREATH: 0
BLOOD IN STOOL: 0
RHINORRHEA: 0
EYE DISCHARGE: 0
VOMITING: 0
WHEEZING: 0
BACK PAIN: 0
TROUBLE SWALLOWING: 0
CHOKING: 0
PHOTOPHOBIA: 0
DIARRHEA: 0
COUGH: 0
CONSTIPATION: 0

## 2020-01-28 LAB
ANTI JO-1 IGG: NEGATIVE
ENA TO SMITH (SM) ANTIBODY: NEGATIVE
SCLERODERMA (SCL-70) AB: NEGATIVE

## 2020-02-02 ASSESSMENT — ENCOUNTER SYMPTOMS
EYE DISCHARGE: 0
CHEST TIGHTNESS: 0
BACK PAIN: 0
CONSTIPATION: 0
CHOKING: 0
ABDOMINAL PAIN: 0
DIARRHEA: 0
WHEEZING: 0
TROUBLE SWALLOWING: 0
COUGH: 0
PHOTOPHOBIA: 0
VOMITING: 0
SORE THROAT: 0
ABDOMINAL DISTENTION: 0
RHINORRHEA: 0
SHORTNESS OF BREATH: 0
NAUSEA: 0
EYE PAIN: 0
BLOOD IN STOOL: 0

## 2020-02-03 ENCOUNTER — OFFICE VISIT (OUTPATIENT)
Dept: INTERNAL MEDICINE CLINIC | Age: 60
End: 2020-02-03
Payer: COMMERCIAL

## 2020-02-03 VITALS
SYSTOLIC BLOOD PRESSURE: 127 MMHG | HEIGHT: 64 IN | OXYGEN SATURATION: 95 % | WEIGHT: 128.2 LBS | DIASTOLIC BLOOD PRESSURE: 85 MMHG | HEART RATE: 100 BPM | TEMPERATURE: 98.3 F | BODY MASS INDEX: 21.89 KG/M2

## 2020-02-03 PROCEDURE — 99213 OFFICE O/P EST LOW 20 MIN: CPT | Performed by: FAMILY MEDICINE

## 2020-02-03 PROCEDURE — 99212 OFFICE O/P EST SF 10 MIN: CPT | Performed by: FAMILY MEDICINE

## 2020-02-03 RX ORDER — GABAPENTIN 100 MG/1
100 CAPSULE ORAL NIGHTLY
COMMUNITY
End: 2020-11-04

## 2020-02-03 RX ORDER — DOXYCYCLINE HYCLATE 100 MG/1
100 CAPSULE ORAL 2 TIMES DAILY
Qty: 20 CAPSULE | Refills: 0 | Status: SHIPPED | OUTPATIENT
Start: 2020-02-03 | End: 2020-02-13

## 2020-02-03 NOTE — PROGRESS NOTES
Ms. Navarro,    Your TSH indicates that your thyroid function is currently in balance.  No additional testing is necessary for a year or unless you develop symptoms of over or underactive thyroid.  Your blood count is normal.  There is no anemia or abnormalities suggesting infection or other problems.    Please contact the clinic if you have additional questions.  Thank you.    Sincerely,    Marycarmen Cuba MD 01/24/2020     01/24/2020    CHOL 241 (H) 01/17/2020    TRIG 282 (H) 01/17/2020    HDL 48 01/17/2020    ALT 38 (H) 01/17/2020    AST 33 (H) 01/17/2020     01/17/2020    K 4.6 01/17/2020     01/17/2020    CREATININE 0.8 01/17/2020    BUN 16 01/17/2020    CO2 25 01/17/2020    TSH 2.920 01/17/2020    INR 1.0 01/18/2018    GLUF 117 (H) 07/14/2017    LABA1C 5.5 01/17/2020    LABMICR <12.0 07/14/2017     Lab Results   Component Value Date    COLORU Yellow 04/05/2019    NITRU Negative 04/05/2019    GLUCOSEU Negative 04/05/2019    KETUA Negative 04/05/2019    UROBILINOGEN 0.2 04/05/2019    BILIRUBINUR Negative 04/05/2019       Assessment and Plan:  Rikki Lugo was seen today for results and hip pain. Diagnoses and all orders for this visit:    Encounter to discuss test results    Arthralgia of hip, unspecified laterality  -     XR HIP BILATERAL W AP PELVIS (2 VIEWS); Future    Elevated rheumatoid factor  -     External Referral To Rheumatology    Polyarthralgia  -     External Referral To Rheumatology        -     Follows with Pain Specialist    Leukopenia, unspecified type         -    Could be secondary to possible autoimmune disorder. Consider referral to Hematology. Chronic bronchitis, unspecified chronic bronchitis type (HCC)  -     doxycycline hyclate (VIBRAMYCIN) 100 MG capsule; Take 1 capsule by mouth 2 times daily for 10 days     Patient provided with work excuse and cleared to return to work on 02/05/2020. Return in about 3 months (around 4/23/2020) for HLD; elevated RF. Lalitha Gonzalez Patient may come in sooner if needed for medical concerns. Patient advised tocall at any time to cancel or re-schedule or for any questions/concerns.       Patricio Ramos DO   02/03/20  3:14 PM

## 2020-02-03 NOTE — PATIENT INSTRUCTIONS
Patient Education        Rheumatoid Factor: About This Test  What is it? A rheumatoid factor (RF) blood test measures the amount of the RF antibody in your blood. The RF antibody can attach to normal body tissue, causing damage. A high RF level can be caused by several autoimmune diseases, including rheumatoid arthritis, and some infections. Sometimes an elevated level of RF is present in healthy people. Why is this test done? A test for rheumatoid factor is done to help support a diagnosis of rheumatoid arthritis. How can you prepare for the test?  · In general, you don't need to prepare before having this test. Your doctor may give you some specific instructions. What happens during the test?  · A health professional takes a sample of your blood. What else should you know about the test?  · A doctor always uses the results of an RF test along with information gained from a medical history and a physical exam before diagnosing rheumatoid arthritis. · Your results will include an explanation of what a \"normal\" result is. This is called a \"reference range. \" It is just a guide. Your doctor will evaluate your results based on your health and other factors. This means that a value that falls outside the normal values listed may still be normal for you. · A small number of people have a high RF level but don't have rheumatoid arthritis. A small number of these people will later have rheumatoid arthritis. · Older adults who don't have rheumatoid arthritis sometimes have a slightly high RF level. How long does the test take? · The test will take a few minutes. What happens after the test?  · You will probably be able to go home right away. · You can go back to your usual activities right away. Follow-up care is a key part of your treatment and safety. Be sure to make and go to all appointments, and call your doctor if you are having problems.  It's also a good idea to keep a list of the medicines you take. Ask your doctor when you can expect to have your test results. Where can you learn more? Go to https://chpepiceweb.[employer]. org and sign in to your [employer] account. Enter [phone] in the [facility] box to learn more about \"Rheumatoid Factor: About This Test.\"     If you do not have an account, please click on the \"Sign Up Now\" link. Current as of: April 1, 2019  Content Version: 12.3  © 7253-3794 Healthwise, Incorporated. Care instructions adapted under license by [facility] ([facility]). If you have questions about a medical condition or this instruction, always ask your healthcare professional. [address] any warranty or liability for your use of this information.

## 2020-02-05 ENCOUNTER — APPOINTMENT (OUTPATIENT)
Dept: GENERAL RADIOLOGY | Age: 60
End: 2020-02-05
Payer: COMMERCIAL

## 2020-02-05 ENCOUNTER — HOSPITAL ENCOUNTER (EMERGENCY)
Age: 60
Discharge: HOME | End: 2020-02-05
Attending: EMERGENCY MEDICINE
Payer: COMMERCIAL

## 2020-02-05 VITALS
SYSTOLIC BLOOD PRESSURE: 118 MMHG | DIASTOLIC BLOOD PRESSURE: 72 MMHG | TEMPERATURE: 98.2 F | BODY MASS INDEX: 20.49 KG/M2 | OXYGEN SATURATION: 98 % | HEART RATE: 81 BPM | HEIGHT: 64 IN | WEIGHT: 120 LBS | RESPIRATION RATE: 16 BRPM

## 2020-02-05 LAB
ANION GAP SERPL CALCULATED.3IONS-SCNC: 14 MMOL/L (ref 7–16)
BUN BLDV-MCNC: 16 MG/DL (ref 6–20)
CALCIUM SERPL-MCNC: 9.6 MG/DL (ref 8.6–10.2)
CHLORIDE BLD-SCNC: 104 MMOL/L (ref 98–107)
CO2: 23 MMOL/L (ref 22–29)
CREAT SERPL-MCNC: 0.9 MG/DL (ref 0.5–1)
EKG ATRIAL RATE: 94 BPM
EKG P AXIS: 73 DEGREES
EKG P-R INTERVAL: 134 MS
EKG Q-T INTERVAL: 368 MS
EKG QRS DURATION: 70 MS
EKG QTC CALCULATION (BAZETT): 460 MS
EKG R AXIS: 68 DEGREES
EKG T AXIS: 73 DEGREES
EKG VENTRICULAR RATE: 94 BPM
GFR AFRICAN AMERICAN: >60
GFR NON-AFRICAN AMERICAN: >60 ML/MIN/1.73
GLUCOSE BLD-MCNC: 115 MG/DL (ref 74–99)
HCT VFR BLD CALC: 38.1 % (ref 34–48)
HEMOGLOBIN: 12.7 G/DL (ref 11.5–15.5)
INFLUENZA A BY PCR: NOT DETECTED
INFLUENZA B BY PCR: NOT DETECTED
MCH RBC QN AUTO: 32.7 PG (ref 26–35)
MCHC RBC AUTO-ENTMCNC: 33.3 % (ref 32–34.5)
MCV RBC AUTO: 98.2 FL (ref 80–99.9)
MONO TEST: NEGATIVE
PDW BLD-RTO: 12.4 FL (ref 11.5–15)
PLATELET # BLD: 208 E9/L (ref 130–450)
PMV BLD AUTO: 9.4 FL (ref 7–12)
POTASSIUM REFLEX MAGNESIUM: 4.1 MMOL/L (ref 3.5–5)
RBC # BLD: 3.88 E12/L (ref 3.5–5.5)
SODIUM BLD-SCNC: 141 MMOL/L (ref 132–146)
STREP GRP A PCR: NEGATIVE
TROPONIN: <0.01 NG/ML (ref 0–0.03)
WBC # BLD: 3.3 E9/L (ref 4.5–11.5)

## 2020-02-05 PROCEDURE — 87502 INFLUENZA DNA AMP PROBE: CPT

## 2020-02-05 PROCEDURE — 93010 ELECTROCARDIOGRAM REPORT: CPT | Performed by: INTERNAL MEDICINE

## 2020-02-05 PROCEDURE — 99283 EMERGENCY DEPT VISIT LOW MDM: CPT

## 2020-02-05 PROCEDURE — 87880 STREP A ASSAY W/OPTIC: CPT

## 2020-02-05 PROCEDURE — 80048 BASIC METABOLIC PNL TOTAL CA: CPT

## 2020-02-05 PROCEDURE — 85027 COMPLETE CBC AUTOMATED: CPT

## 2020-02-05 PROCEDURE — 2580000003 HC RX 258: Performed by: EMERGENCY MEDICINE

## 2020-02-05 PROCEDURE — 86308 HETEROPHILE ANTIBODY SCREEN: CPT

## 2020-02-05 PROCEDURE — 84484 ASSAY OF TROPONIN QUANT: CPT

## 2020-02-05 PROCEDURE — 93005 ELECTROCARDIOGRAM TRACING: CPT | Performed by: EMERGENCY MEDICINE

## 2020-02-05 PROCEDURE — 71046 X-RAY EXAM CHEST 2 VIEWS: CPT

## 2020-02-05 PROCEDURE — 6370000000 HC RX 637 (ALT 250 FOR IP): Performed by: EMERGENCY MEDICINE

## 2020-02-05 PROCEDURE — 36415 COLL VENOUS BLD VENIPUNCTURE: CPT

## 2020-02-05 RX ORDER — SODIUM CHLORIDE 0.9 % (FLUSH) 0.9 %
SYRINGE (ML) INJECTION
Status: DISCONTINUED
Start: 2020-02-05 | End: 2020-02-05 | Stop reason: HOSPADM

## 2020-02-05 RX ORDER — 0.9 % SODIUM CHLORIDE 0.9 %
1000 INTRAVENOUS SOLUTION INTRAVENOUS ONCE
Status: COMPLETED | OUTPATIENT
Start: 2020-02-05 | End: 2020-02-05

## 2020-02-05 RX ADMIN — LIDOCAINE HYDROCHLORIDE: 20 SOLUTION ORAL; TOPICAL at 13:13

## 2020-02-05 RX ADMIN — SODIUM CHLORIDE 1000 ML: 9 INJECTION, SOLUTION INTRAVENOUS at 11:11

## 2020-02-05 ASSESSMENT — ENCOUNTER SYMPTOMS
EYE DISCHARGE: 0
SINUS PAIN: 1
WHEEZING: 0
BACK PAIN: 0
SHORTNESS OF BREATH: 0
SORE THROAT: 1
EYE PAIN: 0
DIARRHEA: 0
EYE REDNESS: 0
NAUSEA: 0
RHINORRHEA: 1
ABDOMINAL DISTENTION: 0
COUGH: 1
VOMITING: 0
SINUS PRESSURE: 0

## 2020-02-05 ASSESSMENT — PAIN DESCRIPTION - LOCATION: LOCATION: THROAT

## 2020-02-05 ASSESSMENT — PAIN SCALES - GENERAL: PAINLEVEL_OUTOF10: 7

## 2020-02-05 NOTE — ED PROVIDER NOTES
Patient is a 65-year-old female with a past medical history of asthma, COPD, chronic rhinitis presenting to the emergency department for URI symptoms. Patient has been having these symptoms for 3 to 4 weeks. She has seen her PCP, and states she has been started on 2 different antibiotics. She finished a course of amoxicillin and is now on a course of doxycycline and states she is still not better. She states she was given these antibiotics from her PCP when she went in for routine blood work and informed her physician of her symptoms. She had no imaging performed. She complains of congestion, rhinorrhea, a fullness in her ears. The fullness in her ears has improved over the last couple days though. She states body aches starting yesterday. They are in her arms, chest, back, legs. COPD and asthma, but states she does not feel short of breath and has not had to use her rescue inhalers. Subjective fevers at home. She has had a productive cough with yellow mucus. States her throat hurts, but is tolerating liquids and solids. She has no nausea, vomiting, diarrhea, constipation. She has no abdominal pain. She states she does have sick contacts at work. Attics and Mucinex at home with very minimal relief. Nothing makes his symptoms worse.       URI   Presenting symptoms: congestion, cough, ear pain, fatigue, fever (subjective), rhinorrhea and sore throat    Severity:  Moderate  Onset quality:  Gradual  Duration:  4 weeks  Timing:  Constant  Progression:  Unchanged  Chronicity:  New  Relieved by:  Nothing  Worsened by:  Nothing  Ineffective treatments:  Decongestant and prescription medications  Associated symptoms: arthralgias, myalgias and sinus pain    Associated symptoms: no headaches, no neck pain and no wheezing    Risk factors: chronic respiratory disease and sick contacts    Risk factors: no immunosuppression and no recent travel         Review of Systems   Constitutional: Positive for fatigue and fever (subjective). Negative for chills. HENT: Positive for congestion, ear pain, rhinorrhea, sinus pain and sore throat. Negative for sinus pressure. Eyes: Negative for pain, discharge and redness. Respiratory: Positive for cough. Negative for shortness of breath and wheezing. Cardiovascular: Negative for chest pain. Gastrointestinal: Negative for abdominal distention, diarrhea, nausea and vomiting. Genitourinary: Negative for dysuria and frequency. Musculoskeletal: Positive for arthralgias and myalgias. Negative for back pain and neck pain. Skin: Negative for rash and wound. Neurological: Negative for weakness and headaches. Hematological: Negative for adenopathy. All other systems reviewed and are negative. Physical Exam  Vitals signs and nursing note reviewed. Constitutional:       Appearance: She is well-developed. HENT:      Head: Normocephalic and atraumatic. Comments: No sinus tenderness. Left Ear: Tympanic membrane normal.      Ears:      Comments: Clear effusion behind the right TM. No purulence, erythema, bulging TM. Nose: Congestion present. Eyes:      Extraocular Movements: Extraocular movements intact. Conjunctiva/sclera: Conjunctivae normal.      Pupils: Pupils are equal, round, and reactive to light. Neck:      Musculoskeletal: Normal range of motion and neck supple. No neck rigidity or muscular tenderness. Cardiovascular:      Rate and Rhythm: Normal rate and regular rhythm. Pulses: Normal pulses. Pulmonary:      Effort: Pulmonary effort is normal. No respiratory distress. Breath sounds: Normal breath sounds. No wheezing or rales. Abdominal:      General: Bowel sounds are normal.      Palpations: Abdomen is soft. Tenderness: There is no abdominal tenderness. There is no guarding or rebound. Lymphadenopathy:      Cervical: No cervical adenopathy. Skin:     General: Skin is warm and dry.       Capillary Refill: Capillary refill takes less than 2 seconds. Neurological:      Mental Status: She is alert and oriented to person, place, and time. Cranial Nerves: No cranial nerve deficit. Sensory: No sensory deficit. Motor: No weakness. Coordination: Coordination normal.      Gait: Gait normal.          Procedures     MDM    Patient presented to the ED for URI symptoms. Initial ddx included but was not limited to COPD exacerbation, URI, otitis media, flu, strep, mono, sinusitis. Lab work obtained and essentially unremarkable other than leukopenia at 3.3. Patient has been following with PCP for leukopenia, it is not new. ACS work up performed and trop negative and ekg with no stemi. Flu, strep, and mono negative. CXR unremarkable. Patient did not have any sinus tenderness, rhinorrhea was clear, no fevers, and completed and currently on abx, sinusitis less likely. Patient appeared clinically well, VSS, lungs CTABL and not hypoxic on ambulatory pulse ox. Consult placed to patients PCP to discuss case. Dr. Gerhardt Spencer recommended to continue the doxy and to follow up with her pulmonologist. She will also be following up with rheumatologist and heme/onc per PCP. She did state there was possible concern that patient may be wanting a work excuse. If patient stable and work up unremarkable, she felt comfortable with the patient being discharged and following up outpatient with consults. Discussed work up, results and imaging with the patient. Educated about symptoms, diagnosis, abx, supportive care, and the need to follow up outpatient. Patient verbalized understanding and was agreeable. Patient did request a work excuse until Monday. Discussed that I can give her a note for today since she was seen in the ED today. Patient understandable. Strict return precautions given. All questions answered. Patient was discharged. ED Course as of Feb 05 2227 Wed Feb 05, 2020   1246 EKG: This EKG is signed and interpreted by me. (10/24/12); Nerve Block (11/12/12); Nerve Block (11-14-12); Nerve Block (Left, 7/23/14); Nerve Block (07/30/14); Nerve Block (Left, 8/6/14); Nerve Block (Left, 9/5/2014); Nerve Block (Left, 09/17/14); Nerve Block (Left, 10 8 14); Nerve Block (N/A, 07/06/2016); Nerve Block (07/13/2016); Nerve Block (Right, 07/20/2016); Nerve Block (Right, 08/10/2016); Nerve Block (Right, 08/17/2016); Nerve Block (Right, 08/24/2016); and other surgical history (03/27/2017). Social History:  reports that she quit smoking about 3 years ago. Her smoking use included cigarettes. She has a 15.00 pack-year smoking history. She has never used smokeless tobacco. She reports current alcohol use of about 2.0 standard drinks of alcohol per week. She reports that she does not use drugs. Family History: family history includes Cancer in her father; Hypertension in her mother; Kidney Disease in her mother. The patients home medications have been reviewed. Allergies: Patient has no known allergies.     -------------------------------------------------- RESULTS -------------------------------------------------  Labs:  Results for orders placed or performed during the hospital encounter of 02/05/20   Rapid influenza A/B antigens   Result Value Ref Range    Influenza A by PCR Not Detected Not Detected    Influenza B by PCR Not Detected Not Detected   Strep Screen Group A Throat   Result Value Ref Range    Strep Grp A PCR Negative Negative   CBC   Result Value Ref Range    WBC 3.3 (L) 4.5 - 11.5 E9/L    RBC 3.88 3.50 - 5.50 E12/L    Hemoglobin 12.7 11.5 - 15.5 g/dL    Hematocrit 38.1 34.0 - 48.0 %    MCV 98.2 80.0 - 99.9 fL    MCH 32.7 26.0 - 35.0 pg    MCHC 33.3 32.0 - 34.5 %    RDW 12.4 11.5 - 15.0 fL    Platelets 537 777 - 527 E9/L    MPV 9.4 7.0 - 12.0 fL   Basic Metabolic Panel w/ Reflex to MG   Result Value Ref Range    Sodium 141 132 - 146 mmol/L    Potassium reflex Magnesium 4.1 3.5 - 5.0 mmol/L    Chloride 104 98 - 107 mmol/L CO2 23 22 - 29 mmol/L    Anion Gap 14 7 - 16 mmol/L    Glucose 115 (H) 74 - 99 mg/dL    BUN 16 6 - 20 mg/dL    CREATININE 0.9 0.5 - 1.0 mg/dL    GFR Non-African American >60 >=60 mL/min/1.73    GFR African American >60     Calcium 9.6 8.6 - 10.2 mg/dL   Mononucleosis screen   Result Value Ref Range    Mono Test Negative Negative   Troponin   Result Value Ref Range    Troponin <0.01 0.00 - 0.03 ng/mL   EKG 12 Lead   Result Value Ref Range    Ventricular Rate 94 BPM    Atrial Rate 94 BPM    P-R Interval 134 ms    QRS Duration 70 ms    Q-T Interval 368 ms    QTc Calculation (Bazett) 460 ms    P Axis 73 degrees    R Axis 68 degrees    T Axis 73 degrees       Radiology:  XR CHEST STANDARD (2 VW)   Final Result   1. No active cardiopulmonary disease. EKG: This EKG is signed and interpreted by ED Physician. Time:  1103   Rate: 94  Rhythm: Sinus. Interpretation: no acute changes. No ST segment changed. T wave inversions in avr and V1 (seen on prior)  Comparison: stable as compared to patient's most recent EKG.      ------------------------- NURSING NOTES AND VITALS REVIEWED ---------------------------  Date / Time Roomed:  2/5/2020 10:25 AM  ED Bed Assignment:  14/14    The nursing notes within the ED encounter and vital signs as below have been reviewed.    /72   Pulse 81   Temp 98.2 °F (36.8 °C) (Oral)   Resp 16   Ht 5' 4\" (1.626 m)   Wt 120 lb (54.4 kg)   SpO2 98%   BMI 20.60 kg/m²   Oxygen Saturation Interpretation: Normal      ------------------------------------------ PROGRESS NOTES ------------------------------------------  ED COURSE MEDICATIONS:                Medications   0.9 % sodium chloride bolus (0 mLs Intravenous Stopped 2/5/20 1247)   aluminum & magnesium hydroxide-simethicone (MAALOX) 30 mL, lidocaine viscous hcl (XYLOCAINE) 5 mL (GI COCKTAIL) ( Oral Given 2/5/20 1313)       I have spoken with the patient and discussed todays results, in addition to providing specific details for the plan of care and counseling regarding the diagnosis and prognosis. Their questions are answered at this time and they are agreeable with the plan. I discussed at length with them reasons for immediate return here for re evaluation. They will followup with primary care by calling their office tomorrow. --------------------------------- ADDITIONAL PROVIDER NOTES ---------------------------------  At this time the patient is without objective evidence of an acute process requiring hospitalization or inpatient management. They have remained hemodynamically stable throughout their entire ED visit and are stable for discharge with outpatient follow-up. The plan has been discussed in detail and they are aware of the specific conditions for emergent return, as well as the importance of follow-up. Discharge Medication List as of 2/5/2020  3:25 PM          Diagnosis:  1. Bronchitis    2. Viral URI    3. COPD with asthma ([facility] [address]    4. Generalized pain    5. Leukopenia, unspecified type        Disposition:  Patient's disposition: Discharge to home  Patient's condition is stable.             [address] [place] [address] Germán   Resident  02/05/20 [id number]

## 2020-02-16 ENCOUNTER — HOSPITAL ENCOUNTER (OUTPATIENT)
Dept: GENERAL RADIOLOGY | Age: 60
Discharge: HOME | End: 2020-02-18
Payer: COMMERCIAL

## 2020-02-16 ENCOUNTER — HOSPITAL ENCOUNTER (OUTPATIENT)
Age: 60
Discharge: HOME | End: 2020-02-18
Payer: COMMERCIAL

## 2020-02-16 PROCEDURE — 73521 X-RAY EXAM HIPS BI 2 VIEWS: CPT

## 2020-02-25 ENCOUNTER — HOSPITAL ENCOUNTER (OUTPATIENT)
Dept: INFUSION THERAPY | Age: 60
Discharge: HOME | End: 2020-02-25
Payer: COMMERCIAL

## 2020-02-25 ENCOUNTER — OFFICE VISIT (OUTPATIENT)
Dept: ONCOLOGY | Age: 60
End: 2020-02-25
Payer: COMMERCIAL

## 2020-02-25 VITALS
BODY MASS INDEX: 21.91 KG/M2 | WEIGHT: 128.3 LBS | HEART RATE: 101 BPM | HEIGHT: 64 IN | SYSTOLIC BLOOD PRESSURE: 126 MMHG | DIASTOLIC BLOOD PRESSURE: 87 MMHG | OXYGEN SATURATION: 96 % | TEMPERATURE: 98.1 F

## 2020-02-25 DIAGNOSIS — D72.819 LEUKOPENIA, UNSPECIFIED TYPE: ICD-10-CM

## 2020-02-25 LAB
ALBUMIN SERPL-MCNC: 4.6 G/DL (ref 3.5–5.2)
ALP BLD-CCNC: 90 U/L (ref 35–104)
ALT SERPL-CCNC: 39 U/L (ref 0–32)
ANION GAP SERPL CALCULATED.3IONS-SCNC: 18 MMOL/L (ref 7–16)
AST SERPL-CCNC: 35 U/L (ref 0–31)
BASOPHILS ABSOLUTE: 0.05 E9/L (ref 0–0.2)
BASOPHILS RELATIVE PERCENT: 1.3 % (ref 0–2)
BILIRUB SERPL-MCNC: 0.2 MG/DL (ref 0–1.2)
BUN BLDV-MCNC: 16 MG/DL (ref 6–20)
CALCIUM SERPL-MCNC: 9.8 MG/DL (ref 8.6–10.2)
CHLORIDE BLD-SCNC: 101 MMOL/L (ref 98–107)
CO2: 21 MMOL/L (ref 22–29)
CREAT SERPL-MCNC: 0.8 MG/DL (ref 0.5–1)
EOSINOPHILS ABSOLUTE: 0.28 E9/L (ref 0.05–0.5)
EOSINOPHILS RELATIVE PERCENT: 7.1 % (ref 0–6)
GFR AFRICAN AMERICAN: >60
GFR NON-AFRICAN AMERICAN: >60 ML/MIN/1.73
GLUCOSE BLD-MCNC: 94 MG/DL (ref 74–99)
HCT VFR BLD CALC: 36.2 % (ref 34–48)
HEMOGLOBIN: 12.1 G/DL (ref 11.5–15.5)
IMMATURE GRANULOCYTES #: 0.01 E9/L
IMMATURE GRANULOCYTES %: 0.3 % (ref 0–5)
LACTATE DEHYDROGENASE: 229 U/L (ref 135–214)
LYMPHOCYTES ABSOLUTE: 1.44 E9/L (ref 1.5–4)
LYMPHOCYTES RELATIVE PERCENT: 36.6 % (ref 20–42)
MCH RBC QN AUTO: 32.4 PG (ref 26–35)
MCHC RBC AUTO-ENTMCNC: 33.4 % (ref 32–34.5)
MCV RBC AUTO: 96.8 FL (ref 80–99.9)
MONOCYTES ABSOLUTE: 0.43 E9/L (ref 0.1–0.95)
MONOCYTES RELATIVE PERCENT: 10.9 % (ref 2–12)
NEUTROPHILS ABSOLUTE: 1.72 E9/L (ref 1.8–7.3)
NEUTROPHILS RELATIVE PERCENT: 43.8 % (ref 43–80)
PDW BLD-RTO: 12.3 FL (ref 11.5–15)
PLATELET # BLD: 269 E9/L (ref 130–450)
PMV BLD AUTO: 9.4 FL (ref 7–12)
POTASSIUM SERPL-SCNC: 4 MMOL/L (ref 3.5–5)
RBC # BLD: 3.74 E12/L (ref 3.5–5.5)
SODIUM BLD-SCNC: 140 MMOL/L (ref 132–146)
TOTAL PROTEIN: 7.4 G/DL (ref 6.4–8.3)
WBC # BLD: 3.9 E9/L (ref 4.5–11.5)

## 2020-02-25 PROCEDURE — 99203 OFFICE O/P NEW LOW 30 MIN: CPT

## 2020-02-25 PROCEDURE — 83615 LACTATE (LD) (LDH) ENZYME: CPT

## 2020-02-25 PROCEDURE — 99214 OFFICE O/P EST MOD 30 MIN: CPT

## 2020-02-25 PROCEDURE — 85025 COMPLETE CBC W/AUTO DIFF WBC: CPT

## 2020-02-25 PROCEDURE — 80053 COMPREHEN METABOLIC PANEL: CPT

## 2020-02-25 RX ORDER — ALENDRONATE SODIUM 70 MG/1
70 TABLET ORAL
COMMUNITY
End: 2022-04-12

## 2020-02-25 RX ORDER — ASCORBIC ACID 1000 MG
1 TABLET ORAL DAILY
COMMUNITY
End: 2022-02-23

## 2020-02-25 RX ORDER — HYDROCODONE BITARTRATE AND ACETAMINOPHEN 7.5; 325 MG/1; MG/1
1 TABLET ORAL EVERY 6 HOURS PRN
COMMUNITY
End: 2022-02-23

## 2020-02-25 NOTE — PROGRESS NOTES
Employer: [employer]   Social Needs    Financial resource strain: Not hard at all   [address] insecurity:     Worry: Never true     Inability: Never true   [employer] needs:     Medical: Not on file     Non-medical: Not on file   Tobacco Use    Smoking status: Former Smoker     Packs/day: 0.50     Years: 30.00     Pack years: 15.00     Types: Cigarettes     Last attempt to quit: 1/31/2017     Years since quitting: 3.0    Smokeless tobacco: Never Used   Substance and Sexual Activity    Alcohol use: Yes     Alcohol/week: 2.0 standard drinks     Types: 2 Cans of beer per week     Comment: occasional    Drug use: No    Sexual activity: Not on file   Lifestyle    Physical activity:     Days per week: Not on file     Minutes per session: Not on file    Stress: Not on file   Relationships    Social connections:     Talks on phone: Not on file     Gets together: Not on file     Attends Taoist service: Not on file     Active member of club or organization: Not on file     Attends meetings of clubs or organizations: Not on file     Relationship status: Not on file    Intimate partner violence:     Fear of current or ex partner: Not on file     Emotionally abused: Not on file     Physically abused: Not on file     Forced sexual activity: Not on file   Other Topics Concern    Not on file   Social History Narrative    Not on file           Occupation:       Retired:  NO          Pacemaker/Defibulator/ICD:  No    Mediport: No           FALLS RISK SCREENING ASSESSMENT    Instructions:  Assess the patient and [place] the appropriate indicators that are present for fall risk identification. Total the numbers circled and assign a fall risk score from Table 2.  Reassess patient at a minimum every 12 weeks or with status change. Assessment   Date  2/25/2020     1. Mental Ability: confusion/cognitively impaired No - 0       2.   Elimination Issues: incontinence, frequency No - 0 3.  Ambulatory: use of assistive devices (walker, cane, off-loading devices), attached to equipment (IV pole, oxygen) No - 0     4. Sensory Limitations: dizziness, vertigo, impaired vision No - 0       5. Age Less than 65 years - 0       6. Medication: diuretics, strong analgesics, hypnotics, sedatives, antihypertensive agents   Yes - 3   7. Falls:  recent history of falls within the last 3 months (not to include slipping or tripping)   No - 0   TOTAL 3    If score of 4 or greater was education given? NA       TABLE 2   Risk Score Risk Level Plan of Care   0-3 Little or  No Risk 1. Provide assistance as indicated for ambulation activities  2. Reorient confused/cognitively impaired patient  3. Call-light/bell within patient's reach  4. Chair/bed in low position, stretcher/bed with siderails up except when performing patient care activities  5. Educate patient/family/caregiver on falls prevention  6.  Reassess in 12 weeks or with any noted change in patient condition which places them at a risk for a fall   4-6 Moderate Risk 1. Provide assistance as indicated for ambulation activities  2. Reorient confused/cognitively impaired patient  3. Call-light/bell within patient's reach  4. Chair/bed in low position, stretcher/bed with siderails up except when performing patient care activities  5. Educate patient/family/caregiver on falls prevention  6. Falls risk precaution (Yellow sticker Level II) placed on patient chart   7 or   Higher High Risk 1. Place patient in easily observable treatment room  2. Patient attended at all times by family member or staff  3. Provide assistance as indicated for ambulation activities  4. Reorient confused/cognitively impaired patient  5. Call-light/bell within patient's reach  6. Chair/bed in low position, stretcher/bed with siderails up except when performing patient care activities  7. Educate patient/family/caregiver on falls prevention  8.   Falls risk precaution (Yellow sticker Level III) placed on patient chart           MALNUTRITION RISK SCREENING ASSESSMENT    Instructions:  Assess the patient and enter the appropriate indicators that are present for nutrition risk identification. Total the numbers entered and assign a risk score. Follow the appropriate action for total score listed below. Assessment   Date  2/25/2020     1. Have you lost weight without trying? 0- No     2. Have you been eating poorly because of a decreased appetite? 0- No   3. Do you have a diagnosis of head and neck cancer? 0- No                                                                                    TOTAL 0        Score of 0-1: No action  Score 2 or greater:  · For Non-Diabetic Patient: Recommend adding Ensure Enlive 2 x daily and provide patient with Ensure wellness bag with coupons  · For Diabetic Patient: Recommend adding Glucerna Shake 2 x daily and provide patient with Glucerna Wellness bag with coupons  · Route to the dietitian via [employer]                       .               Karol Flores

## 2020-02-25 NOTE — PROGRESS NOTES
[address]  [place]   Hematology/Oncology  Consult      Patient Name: Terrance Velazquez  YOB: 1960  PCP: Patricia Bauer DO   Referring Provider: [facility]ase [facility] Jordyn Gunter [id number]     Reason for Consultation:   Chief Complaint   Patient presents with    Consultation        History of Present Illness: This pt is a 63 yo female who presents today in consultation for evaluation of leukopenia. CBC from 2/5/20 showed a WBC of 3.3, normal Hgb and normal platelets. There was no associated differential. She has had a borderline leukocytosis dating back at least 2 years, and before that, Her WBC ranged in the 5's. Recent ESR from 1/24/20 was 20, RF was elevated at 20 and JOSEPH was negative. Previous hepatitis panels were negative. Smear showed a mild leukopenia without dysplasia and no blasts were noted. CMP was normal. She has had symptoms of URI for the last several weeks and been on amoxicillin and doxycycline    Diagnostic Data:     Past Medical History:   Diagnosis Date    Asthma     Chronic pain     Chronic rhinitis 2011    CRPS (complex regional pain syndrome), lower limb     left foot    Pneumonia     Raynauds syndrome 2019    RSD lower limb     left foot    Tinnitus        Patient Active Problem List    Diagnosis Date Noted    RSD lower limb 10/10/2012     Priority: High    CRPS of the left foot. 09/08/2011     Priority: High    Abnormal laboratory test result 02/04/2019    Elevated LFTs 01/21/2019    Dyslipidemia 01/21/2019    Asthma 01/26/2018    COPD exacerbation ([address]     Acute respiratory failure with hypoxia ([address] 01/18/2018    Anemia 01/15/2018    Underweight 01/15/2018    Dependence on nicotine from cigarettes 01/15/2018    Chronic pain syndrome 09/20/2017    Unilateral hearing loss 01/16/2012    Thyroid nodule 11/14/2011    Peripheral neuropathy of the left foot.  09/08/2011    Status post bunionectomy 09/08/2011    chronic neuropathic pain syndrome.  2011        Past Surgical History:   Procedure Laterality Date    BREAST SURGERY  2009    lump left breast    BUNIONECTOMY  2011    left     SECTION  ,     ENDOMETRIAL ABLATION  2009    ESOPHAGUS SURGERY      due to gerd   [address] [employer]    NERVE BLOCK  12    paravertebral sympathetic left side #1    NERVE BLOCK  12    paravert sympathetic left    NERVE BLOCK  2012    left paravertebral sympathetic #3    NERVE BLOCK  10-01-12    left paravertebral sympathetic  #4    NERVE BLOCK  10-10-12    paravertebral sympathetic left #5    NERVE BLOCK  10/24/12    left sympathetic    NERVE BLOCK  12    paravert sympathetic block left #7    NERVE BLOCK  12    left paravertebral sympathetic left #8    NERVE BLOCK Left 14    lumbar sympathetic #1    NERVE BLOCK  14    paravertebral sympathetic left #2    NERVE BLOCK Left 14    PARAVERTEBRAL SUMPATHETIV BLOCK #3    NERVE BLOCK Left 2014    left paravertebral sympathetic #5    NERVE BLOCK Left 14    left paravertebral sympathetic nerve block #6 14    NERVE BLOCK Left 10 8 14    paravert sympathetic #7    NERVE BLOCK N/A 2016    sympathetic #1    NERVE BLOCK  2016    right parasympathic nerve block lumbar #2    NERVE BLOCK Right 2016    paravertebral sympathetic right #3    NERVE BLOCK Right 08/10/2016    lumbar parasympathetic block #4    NERVE BLOCK Right 2016    paravertebral sympathetic right #5    NERVE BLOCK Right 2016    paravertebral sympathetic right #6    OTHER SURGICAL HISTORY  2017    laparscopic incisional hernia repair with mesh    TUBAL LIGATION  1982       Family History  Family History   Problem Relation Age of Onset    Cancer Father         colon    Hypertension Mother     Kidney Disease Mother        Social History    TOBACCO:   reports that she quit smoking about 3 years ago. Her smoking use included cigarettes. She has a 15.00 pack-year smoking history. She has never used smokeless tobacco.  ETOH:   reports current alcohol use of about 2.0 standard drinks of alcohol per week. Home Medications  Prior to Admission medications    Medication Sig Start Date End Date Taking? Authorizing Provider   venlafaxine (EFFEXOR XR) 37.5 MG extended release capsule TAKE ONE CAPSULE BY MOUTH EVERY DAY 2/7/20   Anderson Claros DO   gabapentin (NEURONTIN) 100 MG capsule Take 100 mg by mouth nightly.     Historical Provider, MD   montelukast (SINGULAIR) 5 MG chewable tablet Take 1 tablet by mouth nightly 1/20/20   Anderson Claros DO   famotidine (PEPCID) 20 MG tablet Take 1 tablet by mouth 2 times daily 1/20/20   Anderson Claros,    Folinic Acid-Vit B6-Vit B12 (FOLINIC-PLUS) 4-50-2 MG TABS TAKE ONE TABLET BY MOUTH EVERY DAY 1/20/20   Anderson Claros DO   Calcium Carb-Cholecalciferol (CALCIUM 1000 + D) 1000-800 MG-UNIT TABS Take 1 tablet by mouth daily 1/20/20   Anderson Claros DO   atorvastatin (LIPITOR) 20 MG tablet Take 1 tablet by mouth daily 1/20/20   Anderson Claros DO   Dextromethorphan-guaiFENesin [facility] DM MAXIMUM STRENGTH)  MG TB12 Take 1 tablet by mouth 2 times daily 1/20/20   Adnerson Claros DO   azelastine (ASTELIN) 0.1 % nasal spray USE 2 SPRAYS IN EACH NOSTRIL TWICE DAILY AS DIRECTED 9/23/19   Anderson Claros DO   NIFEdipine (PROCARDIA) 10 MG capsule TAKE ONE CAPSULE BY MOUTH TWO TIMES A DAY 8/28/19   Historical Provider, MD   meloxicam (MOBIC) 7.5 MG tablet TAKE ONE TABLET BY MOUTH EVERY DAY 6/26/19   Historical Provider, MD   naloxegol (MOVANTIK) 25 MG TABS tablet Take 25 mg by mouth every morning    Historical Provider, MD   polyethylene glycol (MIRALAX) powder Take 17 g by mouth daily 10/22/18   Anderson Claros DO   VENTOLIN  (90 Base) MCG/ACT inhaler INHALE 2 PUFFS INTO THE LUNGS EVERY 6 HOURS AS NEEDED FOR 7.0 01/17/2020    LABALBU 4.6 01/17/2020    BILITOT 0.3 01/17/2020    ALKPHOS 75 01/17/2020    AST 33 (H) 01/17/2020    ALT 38 (H) 01/17/2020    LABGLOM >60 02/05/2020    GFRAA >60 02/05/2020       Lab Results   Component Value Date    IRON 39 01/16/2018    TIBC 190 (L) 01/16/2018    FERRITIN 644 01/16/2018           Radiology-    Xr Chest Standard (2 Vw)    Result Date: 2/5/2020  LOCATION: 200 EXAM: XR CHEST (2 VW) COMPARISON: None HISTORY: Shortness of breath TECHNIQUE: PA and lateral  views of the chest were obtained. FINDINGS:  SUPPORT DEVICES: None. LUNGS: Clear with no areas of consolidation. No lung nodules. PLEURA: No pneumothorax visualized. LUNG VOLUMES: Satisfactory inspirator effort. MEDIASTINAL STRUCTURES: No lymphadenopathy. Normal aortic contour. HEART SIZE: Normal. UPPER ABDOMEN: Unremarkable. BONES AND SOFT TISSUES: No fracture or soft tissue abnormality. 1. No active cardiopulmonary disease. Xr Hip Bilateral W Ap Pelvis (2 Views)    Result Date: 2/16/2020  LOCATION: 200 EXAM:  XR HIP BILATERAL W AP PELVIS (2 VIEWS) COMPARISON: None HISTORY:M25.559 Arthralgia of hip, unspecified laterality CC: Dr. Thedora Halsted, Rheumatology hip pain; elevated RF TECHNIQUE: 5 views of the head were obtained. FINDINGS: The bones, joints, and soft tissues are within normal limits. There is no evidence of fracture or malalignment. No radiopaque foreign body noted. Age-appropriate changes. No acute findings        ASSESSMENT/PLAN :    Felice Nguyen was seen today for consultation.     Diagnoses and all orders for this visit:    Leukopenia, unspecified type    63 yo female  Leukopenia  Asthma    - Smear report reviewed, leukopenia noted but no morphologic abnormalities or dysplasia noted  - She has been running low to low normal for at least 2 years with regards to her WBC  - Elevated RF factor, along with likely viral URI is the most likely reason for her worsened leukopenia at this time  - Will repeat CBC w/ diff and LDH today. And repeat again in ~4 weeks. Suspect at this time if she has convalesced from her URI there should be improvement  - Will hold on any further invasive testing for now.  She is low risk for HIV and will hold on that as well  - RTC in ~4 weeks        Paola Graves MD   Electronically signed 2/25/2020 at 2:45 PM

## 2020-03-12 ENCOUNTER — HOSPITAL ENCOUNTER (OUTPATIENT)
Age: 60
Discharge: HOME | End: 2020-03-14
Payer: COMMERCIAL

## 2020-03-12 LAB
BASOPHILS ABSOLUTE: 0.05 E9/L (ref 0–0.2)
BASOPHILS RELATIVE PERCENT: 1 % (ref 0–2)
EOSINOPHILS ABSOLUTE: 0.37 E9/L (ref 0.05–0.5)
EOSINOPHILS RELATIVE PERCENT: 7.5 % (ref 0–6)
HCT VFR BLD CALC: 37.2 % (ref 34–48)
HEMOGLOBIN: 11.5 G/DL (ref 11.5–15.5)
IMMATURE GRANULOCYTES #: 0.02 E9/L
IMMATURE GRANULOCYTES %: 0.4 % (ref 0–5)
LYMPHOCYTES ABSOLUTE: 1.76 E9/L (ref 1.5–4)
LYMPHOCYTES RELATIVE PERCENT: 35.6 % (ref 20–42)
MCH RBC QN AUTO: 31.1 PG (ref 26–35)
MCHC RBC AUTO-ENTMCNC: 30.9 % (ref 32–34.5)
MCV RBC AUTO: 100.5 FL (ref 80–99.9)
MONOCYTES ABSOLUTE: 0.56 E9/L (ref 0.1–0.95)
MONOCYTES RELATIVE PERCENT: 11.3 % (ref 2–12)
NEUTROPHILS ABSOLUTE: 2.18 E9/L (ref 1.8–7.3)
NEUTROPHILS RELATIVE PERCENT: 44.2 % (ref 43–80)
PDW BLD-RTO: 12.4 FL (ref 11.5–15)
PLATELET # BLD: 254 E9/L (ref 130–450)
PMV BLD AUTO: 10.5 FL (ref 7–12)
RBC # BLD: 3.7 E12/L (ref 3.5–5.5)
WBC # BLD: 4.9 E9/L (ref 4.5–11.5)

## 2020-03-12 PROCEDURE — 85025 COMPLETE CBC W/AUTO DIFF WBC: CPT

## 2020-03-12 PROCEDURE — 36415 COLL VENOUS BLD VENIPUNCTURE: CPT

## 2020-04-27 RX ORDER — ATORVASTATIN CALCIUM 20 MG/1
TABLET, FILM COATED ORAL
Qty: 30 TABLET | Refills: 0 | Status: SHIPPED
Start: 2020-04-27 | End: 2020-06-02

## 2020-04-27 RX ORDER — VENLAFAXINE HYDROCHLORIDE 37.5 MG/1
CAPSULE, EXTENDED RELEASE ORAL
Qty: 30 CAPSULE | Refills: 0 | Status: SHIPPED
Start: 2020-04-27 | End: 2020-06-02

## 2020-06-02 RX ORDER — VENLAFAXINE HYDROCHLORIDE 37.5 MG/1
CAPSULE, EXTENDED RELEASE ORAL
Qty: 30 CAPSULE | Refills: 0 | Status: SHIPPED
Start: 2020-06-02 | End: 2020-07-07 | Stop reason: SDUPTHER

## 2020-06-02 RX ORDER — ATORVASTATIN CALCIUM 20 MG/1
TABLET, FILM COATED ORAL
Qty: 30 TABLET | Refills: 0 | Status: SHIPPED
Start: 2020-06-02 | End: 2020-07-07 | Stop reason: SDUPTHER

## 2020-06-19 ENCOUNTER — OFFICE VISIT (OUTPATIENT)
Dept: ONCOLOGY | Age: 60
End: 2020-06-19
Payer: COMMERCIAL

## 2020-06-19 VITALS
TEMPERATURE: 97.9 F | OXYGEN SATURATION: 98 % | HEART RATE: 93 BPM | DIASTOLIC BLOOD PRESSURE: 74 MMHG | WEIGHT: 128 LBS | SYSTOLIC BLOOD PRESSURE: 114 MMHG | BODY MASS INDEX: 21.97 KG/M2

## 2020-06-19 PROCEDURE — 99212 OFFICE O/P EST SF 10 MIN: CPT

## 2020-06-19 NOTE — PROGRESS NOTES
Kidney Disease Mother        Social History    TOBACCO:   reports that she quit smoking about 3 years ago. Her smoking use included cigarettes. She has a 15.00 pack-year smoking history. She has never used smokeless tobacco.  ETOH:   reports current alcohol use of about 2.0 standard drinks of alcohol per week. Home Medications  Prior to Admission medications    Medication Sig Start Date End Date Taking? Authorizing Provider   atorvastatin (LIPITOR) 20 MG tablet TAKE ONE TABLET BY MOUTH EVERY DAY 6/2/20  Yes Tu Rutledge MD   venlafaxine (EFFEXOR XR) 37.5 MG extended release capsule TAKE ONE CAPSULE BY MOUTH EVERY DAY 6/2/20  Yes Tu Rutledge MD   HYDROcodone-acetaminophen (NORCO) 7.5-325 MG per tablet Take 1 tablet by mouth every 6 hours as needed for Pain. Yes Historical Provider, MD   Ginkgo Biloba 40 MG TABS Take 1 tablet by mouth daily   Yes Historical Provider, MD   alendronate (FOSAMAX) 70 MG tablet Take 70 mg by mouth every 7 days   Yes Historical Provider, MD   gabapentin (NEURONTIN) 100 MG capsule Take 100 mg by mouth nightly.    Yes Historical Provider, MD   montelukast (SINGULAIR) 5 MG chewable tablet Take 1 tablet by mouth nightly 1/20/20  Yes Leydi June DO   famotidine (PEPCID) 20 MG tablet Take 1 tablet by mouth 2 times daily 1/20/20  Yes Leydi June DO   Folinic Acid-Vit B6-Vit B12 (FOLINIC-PLUS) 4-50-2 MG TABS TAKE ONE TABLET BY MOUTH EVERY DAY 1/20/20  Yes Leydi June DO   Calcium Carb-Cholecalciferol (CALCIUM 1000 + D) 1000-800 MG-UNIT TABS Take 1 tablet by mouth daily 1/20/20  Yes Leydi June DO   Dextromethorphan-guaiFENesin [facility] DM MAXIMUM STRENGTH)  MG TB12 Take 1 tablet by mouth 2 times daily 1/20/20  Yes Leydi June DO   azelastine (ASTELIN) 0.1 % nasal spray USE 2 SPRAYS IN EACH NOSTRIL TWICE DAILY AS DIRECTED 9/23/19  Yes Leydi June DO   NIFEdipine (PROCARDIA) 10 MG capsule TAKE ONE CAPSULE BY MOUTH TWO TIMES A cough productive of a small amount of white/yellow sputum   Gastrointestinal: No abdominal pain, appetite loss, blood in stools. No change in bowel habits. No hematemesis   Genitourinary: Patient acknowledges no dysuria, trouble voiding, or hematuria. No nocturia or increased frequency. Musculoskeletal: No gait disturbance, weakness or joint complaints. Integumentary: No rash or pruritis. Neurological: No headache, diplopia, change in muscle strength, numbness or tingling. No change in gait, balance, coordination, mood, affect, memory, mentation, behavior. Psychiatric: No anxiety, or depression. Endocrine: No temperature intolerance. No excessive thirst, fluid intake, or urination. No tremor. Hematologic/Lymphatic: No abnormal bruising or bleeding, blood clots or swollen lymph nodes. Allergic/Immunologic: No nasal congestion or hives. Objective  /74 (Site: Left Upper Arm, Position: Sitting, Cuff Size: Medium Adult)   Pulse 93   Temp 97.9 °F (36.6 °C) (Temporal)   Wt 128 lb (58.1 kg)   SpO2 98%   BMI 21.97 kg/m²     Physical Performance Status    Physical Exam:  General: AAO to person, place, time, and purpose, appears stated age, cooperative, no acute distress, pleasant   Head and neck : PERRLA, EOMI . Sclera non icteric. Oropharynx : Clear  Neck: no JVD,  no adenopathy  LYMPHATICS : No LAD  Lungs: Clear to auscultation   Heart: Regular rate and regular rhythm, no murmur, normal S1, S2  Abdomen: Soft, non-tender;no masses, no organomegaly  Extremities: No edema,no cyanosis, no clubbing. Skin:  No rash. Neurologic:Cranial nerves grossly intact. No focal motor or sensory deficits .     Recent Laboratory Data-   Lab Results   Component Value Date    WBC 4.9 03/12/2020    HGB 11.5 03/12/2020    HCT 37.2 03/12/2020    .5 (H) 03/12/2020     03/12/2020    LYMPHOPCT 35.6 03/12/2020    RBC 3.70 03/12/2020    MCH 31.1 03/12/2020    MCHC 30.9 (L) 03/12/2020    RDW 12.4 03/12/2020

## 2020-06-29 RX ORDER — ALBUTEROL SULFATE 90 UG/1
2 AEROSOL, METERED RESPIRATORY (INHALATION) EVERY 6 HOURS PRN
Qty: 18 G | Refills: 1 | OUTPATIENT
Start: 2020-06-29

## 2020-07-10 RX ORDER — VENLAFAXINE HYDROCHLORIDE 37.5 MG/1
CAPSULE, EXTENDED RELEASE ORAL
Qty: 30 CAPSULE | Refills: 0 | Status: SHIPPED
Start: 2020-07-10 | End: 2020-07-11

## 2020-07-10 RX ORDER — ATORVASTATIN CALCIUM 20 MG/1
TABLET, FILM COATED ORAL
Qty: 30 TABLET | Refills: 0 | Status: SHIPPED
Start: 2020-07-10 | End: 2020-08-06 | Stop reason: SDUPTHER

## 2020-07-11 RX ORDER — VENLAFAXINE HYDROCHLORIDE 37.5 MG/1
CAPSULE, EXTENDED RELEASE ORAL
Qty: 30 CAPSULE | Refills: 0 | Status: SHIPPED
Start: 2020-07-11 | End: 2020-07-14 | Stop reason: SDUPTHER

## 2020-07-16 RX ORDER — VENLAFAXINE HYDROCHLORIDE 37.5 MG/1
CAPSULE, EXTENDED RELEASE ORAL
Qty: 30 CAPSULE | Refills: 1 | Status: SHIPPED
Start: 2020-07-16 | End: 2020-08-06 | Stop reason: SDUPTHER

## 2020-07-16 RX ORDER — ALBUTEROL SULFATE 90 UG/1
2 AEROSOL, METERED RESPIRATORY (INHALATION) EVERY 6 HOURS PRN
Qty: 18 G | Refills: 1 | Status: SHIPPED
Start: 2020-07-16 | End: 2020-08-06 | Stop reason: SDUPTHER

## 2020-08-06 ENCOUNTER — OFFICE VISIT (OUTPATIENT)
Dept: FAMILY MEDICINE CLINIC | Age: 60
End: 2020-08-06
Payer: COMMERCIAL

## 2020-08-06 VITALS
RESPIRATION RATE: 16 BRPM | BODY MASS INDEX: 21.03 KG/M2 | SYSTOLIC BLOOD PRESSURE: 116 MMHG | HEART RATE: 108 BPM | WEIGHT: 123.2 LBS | OXYGEN SATURATION: 99 % | HEIGHT: 64 IN | DIASTOLIC BLOOD PRESSURE: 76 MMHG | TEMPERATURE: 97.4 F

## 2020-08-06 PROCEDURE — 99213 OFFICE O/P EST LOW 20 MIN: CPT | Performed by: FAMILY MEDICINE

## 2020-08-06 RX ORDER — ALBUTEROL SULFATE 90 UG/1
2 AEROSOL, METERED RESPIRATORY (INHALATION) EVERY 6 HOURS PRN
Qty: 18 G | Refills: 2 | Status: SHIPPED
Start: 2020-08-06 | End: 2022-01-11 | Stop reason: SDUPTHER

## 2020-08-06 RX ORDER — PSYLLIUM HUSK 0.4 G
1 CAPSULE ORAL DAILY
Qty: 90 TABLET | Refills: 1 | Status: SHIPPED
Start: 2020-08-06 | End: 2022-01-11 | Stop reason: SDUPTHER

## 2020-08-06 RX ORDER — VENLAFAXINE HYDROCHLORIDE 37.5 MG/1
CAPSULE, EXTENDED RELEASE ORAL
Qty: 30 CAPSULE | Refills: 2 | Status: SHIPPED
Start: 2020-08-06 | End: 2020-11-11 | Stop reason: SDUPTHER

## 2020-08-06 RX ORDER — FLUTICASONE FUROATE AND VILANTEROL TRIFENATATE 100; 25 UG/1; UG/1
POWDER RESPIRATORY (INHALATION)
Qty: 60 EACH | Refills: 1 | Status: SHIPPED
Start: 2020-08-06 | End: 2021-03-19

## 2020-08-06 RX ORDER — LEUCOVORIN/PYRIDOX/MECOBALAMIN 4-50-2 MG
TABLET ORAL
Qty: 90 TABLET | Refills: 0 | Status: SHIPPED
Start: 2020-08-06 | End: 2022-01-11 | Stop reason: SDUPTHER

## 2020-08-06 RX ORDER — ATORVASTATIN CALCIUM 20 MG/1
TABLET, FILM COATED ORAL
Qty: 30 TABLET | Refills: 2 | Status: SHIPPED
Start: 2020-08-06 | End: 2020-11-11 | Stop reason: SDUPTHER

## 2020-08-06 NOTE — PROGRESS NOTES
Subjective:  64 y.o. female who presents to the office today with chief complaint:  Chief Complaint   Patient presents with   Carolyn Nichols New Doctor     Previously saw Dr. Sanket Choi. Sees Dr. Turner Maya. Abdominal bloating: Patient with complaints of abdominal bloating in her stomach after drinking water. She states that she was referred to a gastroenterologist and ENT by her pulmonologist.  She states that she has not gone to these appointments as she has not been called by the offices. Past Medical History: Anxiety and depression, COPD- follows Dr. Turner Maya, HLD, RSD- follows Dr. Ashley Leroy. Follows Dr. Collin Bruner for podiatry. GERD. Medications: Venlafaxine 37.5mg, albuterol inhaler, singulair 10mg qhs, breo 100-25mg, spiriva, astelin nasal spray, Atorvasatin 20mg, Fosamax 70 mg weekly, calcium-vit d, Norco 7.5-325 TID, movantic 25mg daily- prescribed by Dr. Ashley Leroy with pain management. Allergies: None. Surgeries: Multiple foot surgeries by Dr. Collin Bruner. Family History: Mother  due to kidney disease and \"stomach aneurysm\". Father with colon cancer. Brother and sister living. Brother with COPD. Sister with HLD. Social:    Education: 11th grade. Employment: [employer] [address] work. Diet: Good. Eats fruits and vegetables. Exercise: Works out at home. Caffeine: 2 cups of tea per day. Tobacco: Former smoker. Quit a year ago. Alcohol: 3 beers every other day. Illicit Drugs: None. Health Maintenance Due   Topic Date Due    Shingles Vaccine (1 of 2) 2012       Cancer History: Clear. Family Cancer History: Father with colon cancer. Review of Systems    Review of Systems: All bolded are positive, all others are negative. General:  Fever, chills, diaphoresis, fatigue, malaise, night sweats, weight loss  Psychological:  Anxiety, disorientation, hallucinations. ENT:  Epistaxis, headaches, vertigo, visual changes.   Cardiovascular:  Chest pain, irregular heartbeats, palpitations, paroxysmal nocturnal dyspnea. Respiratory:  Shortness of breath, coughing, sputum production, hemoptysis, wheezing, orthopnea. Gastrointestinal:  Nausea, vomiting, diarrhea, heartburn, constipation, abdominal pain, hematemesis, hematochezia, melena, acholic stools  Genito-Urinary:  Dysuria, urgency, frequency, hematuria  Musculoskeletal:  Joint pain, joint stiffness, joint swelling, muscle pain  Neurology:  Headache, focal neurological deficits, weakness, numbness, paresthesia  Derm:  Rashes, ulcers, excoriations, bruising  Extremities:  Decreased ROM, peripheral edema, mottling      Objective:  Vitals:    08/06/20 1504   BP: 116/76   Pulse: 108   Resp: 16   Temp: 97.4 °F (36.3 °C)   SpO2: 99%     Physical Exam  Vitals signs and nursing note reviewed. Constitutional:       General: She is not in acute distress. Appearance: She is well-developed. She is not diaphoretic. HENT:      Head: Normocephalic and atraumatic. Right Ear: External ear normal.      Left Ear: External ear normal.      Nose: Nose normal.      Mouth/Throat:      Mouth: Mucous membranes are moist.   Eyes:      General:         Right eye: No discharge. Left eye: No discharge. Conjunctiva/sclera: Conjunctivae normal.      Pupils: Pupils are equal, round, and reactive to light. Neck:      Musculoskeletal: Normal range of motion and neck supple. Cardiovascular:      Rate and Rhythm: Normal rate and regular rhythm. Heart sounds: Normal heart sounds. No murmur. No friction rub. No gallop. Pulmonary:      Effort: Pulmonary effort is normal. No respiratory distress. Breath sounds: Normal breath sounds. No wheezing or rales. Abdominal:      General: Bowel sounds are normal. There is no distension. Palpations: Abdomen is soft. Tenderness: There is no abdominal tenderness. There is no guarding or rebound. Musculoskeletal: Normal range of motion.    Lymphadenopathy: Cervical: No cervical adenopathy. Neurological:      Mental Status: She is alert and oriented to person, place, and time. Psychiatric:         Behavior: Behavior normal.         Thought Content: Thought content normal.         Judgment: Judgment normal.       Osteopathic exam:  Patient evaluated in the seated position. Normal thoracic kyphosis and lumbar lordosis. No acute tissue texturechanges. No acute somatic dysfunction of the cervical, thoracic, or lumbar spine. Results for orders placed or performed during the hospital encounter of 03/12/20   CBC Auto Differential   Result Value Ref Range    WBC 4.9 4.5 - 11.5 E9/L    RBC 3.70 3.50 - 5.50 E12/L    Hemoglobin 11.5 11.5 - 15.5 g/dL    Hematocrit 37.2 34.0 - 48.0 %    .5 (H) 80.0 - 99.9 fL    MCH 31.1 26.0 - 35.0 pg    MCHC 30.9 (L) 32.0 - 34.5 %    RDW 12.4 11.5 - 15.0 fL    Platelets 411 665 - 996 E9/L    MPV 10.5 7.0 - 12.0 fL    Neutrophils % 44.2 43.0 - 80.0 %    Immature Granulocytes % 0.4 0.0 - 5.0 %    Lymphocytes % 35.6 20.0 - 42.0 %    Monocytes % 11.3 2.0 - 12.0 %    Eosinophils % 7.5 (H) 0.0 - 6.0 %    Basophils % 1.0 0.0 - 2.0 %    Neutrophils Absolute 2.18 1.80 - 7.30 E9/L    Immature Granulocytes # 0.02 E9/L    Lymphocytes Absolute 1.76 1.50 - 4.00 E9/L    Monocytes Absolute 0.56 0.10 - 0.95 E9/L    Eosinophils Absolute 0.37 0.05 - 0.50 E9/L    Basophils Absolute 0.05 0.00 - 0.20 E9/L         Assessment and Plan:  Rula Allen was seen today for established new doctor. Diagnoses and all orders for this visit:    Bronchitis with tracheitis    Mixed hyperlipidemia    Age-related osteoporosis without current pathological fracture    Leukopenia, unspecified type    Anxiety        1. Abdominal bloating with gas: Patient is to call the clinics that she was referred to by Dr. Anisa Escalona; if she is unable to get appointments, she will call back to this clinic and she will be referred from here. Abdominal exam was unremarkable.     2.   COPD: Improved since smoking cessation. Continue to follow pulmonology. 3.   Depression: Continue venlafaxine at current dose. 4.   Labs: Collect CBC, CMP, TSH, lipid panel    Follow-up in 3 months. Patient may come in sooner if needed for medical concerns. Patient advised to call at any time to cancel or re-scheduleor for any questions/concerns. Please note that >15 minutes was spent face-to-face with the patient gathering history, performing physical exam, discussing findings, counseling the patient, and determining planforward. All questions and concerns addressed and answered.          Nabil Robledo,    8/6/20  4:22 PM

## 2020-08-18 ENCOUNTER — HOSPITAL ENCOUNTER (OUTPATIENT)
Age: 60
Discharge: HOME | End: 2020-08-20
Payer: COMMERCIAL

## 2020-08-18 PROCEDURE — U0003 INFECTIOUS AGENT DETECTION BY NUCLEIC ACID (DNA OR RNA); SEVERE ACUTE RESPIRATORY SYNDROME CORONAVIRUS 2 (SARS-COV-2) (CORONAVIRUS DISEASE [COVID-19]), AMPLIFIED PROBE TECHNIQUE, MAKING USE OF HIGH THROUGHPUT TECHNOLOGIES AS DESCRIBED BY CMS-2020-01-R: HCPCS

## 2020-08-21 ENCOUNTER — PREP FOR PROCEDURE (OUTPATIENT)
Dept: PODIATRY | Age: 60
End: 2020-08-21

## 2020-08-21 DIAGNOSIS — G57.51 TARSAL TUNNEL SYNDROME OF RIGHT SIDE: Primary | ICD-10-CM

## 2020-08-21 LAB
SARS-COV-2: NOT DETECTED
SOURCE: NORMAL

## 2020-08-25 ENCOUNTER — ANESTHESIA EVENT (OUTPATIENT)
Dept: OPERATING ROOM | Age: 60
End: 2020-08-25
Payer: COMMERCIAL

## 2020-08-25 ENCOUNTER — TELEPHONE (OUTPATIENT)
Dept: FAMILY MEDICINE CLINIC | Age: 60
End: 2020-08-25

## 2020-08-25 NOTE — PROGRESS NOTES
Have you been tested for COVID  Yes  Tested on 8-21-20 for OR scheulued 8-26-20        Have you been told you were positive for COVID No  Have you had any known exposure to someone that is positive for COVID No  Do you have a cough                   No              Do you have shortness of breath No                 Do you have a sore throat            No                Are you having chills                    No                Are you having muscle aches. No                 Please come to the hospital wearing a mask and have your significant other wear a mask as well. Both of you should check your temperature before leaving to come here,  if it is 100 or higher please call [phone] for instruction. [facility] PRE[facility]ION TESTING INSTRUCTIONS    The Preadmission Testing patient is instructed accordingly using the following criteria (check applicable):    ARRIVAL INSTRUCTIONS:  [x] Parking the day of Surgery is located in the Main Entrance lot. Upon entering the door, make an immediate right to the surgery reception desk    [] [phone] is available Monday through Friday 6 am to 6 pm    [x] Bring photo ID and insurance card    [] Bring in a copy of Living will or Durable Power of  papers.     [x] Please be sure to arrange for responsible adult to provide transportation to and from the hospital    [] Please arrange for responsible adult to be with you for the 24 hour period post procedure due to having anesthesia      GENERAL INSTRUCTIONS:    [x] Nothing by mouth after midnight, including gum, candy, mints or water    [x] You may brush your teeth, but do not swallow any water    [x] Take medications as instructed with 1-2 oz of water    [x] Stop herbal supplements and vitamins 5 days prior to procedure    [x] Follow preop dosing of blood thinners per physician instructions    [] Take 1/2 dose of evening insulin, but no insulin after midnight    [] No oral diabetic medications after midnight    [] If diabetic and have low blood sugar or feel symptomatic, take 1-2oz apple juice only    [x] Bring inhalers day of surgery    [] Bring C-PAP/ Bi-Pap day of surgery    [] Bring urine specimen day of surgery    [x] Shower or bath with soap, lather and rinse well, AM of Surgery, no lotion, powders or creams to surgical site    [] Follow bowel prep as instructed per surgeon    [x] No tobacco products within 24 hours of surgery     [x] No alcohol or illegal drug use within 24 hours of surgery.     [x] Jewelry, body piercing's, eyeglasses, contact lenses and dentures are not permitted into surgery (bring cases)      [x] Please do not wear any nail polish, make up or hair products on the day of surgery    [x] If not already done, you can expect a call from registration    [x] You can expect a call the business day prior to procedure to notify you if your arrival time changes    [x] If you receive a survey after surgery we would greatly appreciate your comments    [] Parent/guardian of a minor must accompany their child and remain on the premises  the entire time they are under our care     [] Pediatric patients may bring favorite toy, blanket or comfort item with them    [] A caregiver or family member must remain with the patient during their stay if they are mentally handicapped, have dementia, disoriented or unable to use a call light or would be a safety concern if left unattended    [x] Please notify surgeon if you develop any illness between now and time of surgery (cold, cough, sore throat, fever, nausea, vomiting) or any signs of infections  including skin, wounds, and dental.    [x]  The Outpatient Pharmacy is available to fill your prescription here on your day of surgery, ask your preop nurse for details    [x] Other instructions  EDUCATIONAL MATERIALS PROVIDED:    [] PAT Preoperative Education Packet/Booklet     [] Medication List    [] Fluoroscopy Information Pamphlet    [] Transfusion bracelet applied with instructions    [] Joint replacement video reviewed    [] Shower with soap, lather and rinse well, and use CHG wipes provided the evening before surgery as instructed

## 2020-08-26 ENCOUNTER — HOSPITAL ENCOUNTER (OUTPATIENT)
Age: 60
Setting detail: OUTPATIENT SURGERY
Discharge: HOME | End: 2020-08-26
Attending: PODIATRIST | Admitting: PODIATRIST
Payer: COMMERCIAL

## 2020-08-26 ENCOUNTER — ANESTHESIA (OUTPATIENT)
Dept: OPERATING ROOM | Age: 60
End: 2020-08-26
Payer: COMMERCIAL

## 2020-08-26 VITALS
SYSTOLIC BLOOD PRESSURE: 115 MMHG | RESPIRATION RATE: 16 BRPM | BODY MASS INDEX: 20.49 KG/M2 | HEART RATE: 89 BPM | TEMPERATURE: 97.5 F | OXYGEN SATURATION: 96 % | DIASTOLIC BLOOD PRESSURE: 69 MMHG | HEIGHT: 64 IN | WEIGHT: 120 LBS

## 2020-08-26 VITALS
SYSTOLIC BLOOD PRESSURE: 115 MMHG | OXYGEN SATURATION: 100 % | DIASTOLIC BLOOD PRESSURE: 70 MMHG | RESPIRATION RATE: 18 BRPM

## 2020-08-26 PROCEDURE — 6360000002 HC RX W HCPCS: Performed by: NURSE ANESTHETIST, CERTIFIED REGISTERED

## 2020-08-26 PROCEDURE — 2580000003 HC RX 258: Performed by: NURSE ANESTHETIST, CERTIFIED REGISTERED

## 2020-08-26 PROCEDURE — 2580000003 HC RX 258: Performed by: STUDENT IN AN ORGANIZED HEALTH CARE EDUCATION/TRAINING PROGRAM

## 2020-08-26 PROCEDURE — 7100000010 HC PHASE II RECOVERY - FIRST 15 MIN: Performed by: PODIATRIST

## 2020-08-26 PROCEDURE — 3600000013 HC SURGERY LEVEL 3 ADDTL 15MIN: Performed by: PODIATRIST

## 2020-08-26 PROCEDURE — 3600000003 HC SURGERY LEVEL 3 BASE: Performed by: PODIATRIST

## 2020-08-26 PROCEDURE — 2500000003 HC RX 250 WO HCPCS: Performed by: PODIATRIST

## 2020-08-26 PROCEDURE — 2500000003 HC RX 250 WO HCPCS: Performed by: NURSE ANESTHETIST, CERTIFIED REGISTERED

## 2020-08-26 PROCEDURE — 3700000000 HC ANESTHESIA ATTENDED CARE: Performed by: PODIATRIST

## 2020-08-26 PROCEDURE — 3700000001 HC ADD 15 MINUTES (ANESTHESIA): Performed by: PODIATRIST

## 2020-08-26 PROCEDURE — 6360000002 HC RX W HCPCS: Performed by: STUDENT IN AN ORGANIZED HEALTH CARE EDUCATION/TRAINING PROGRAM

## 2020-08-26 PROCEDURE — 2709999900 HC NON-CHARGEABLE SUPPLY: Performed by: PODIATRIST

## 2020-08-26 PROCEDURE — 7100000011 HC PHASE II RECOVERY - ADDTL 15 MIN: Performed by: PODIATRIST

## 2020-08-26 RX ORDER — DEXAMETHASONE SODIUM PHOSPHATE 4 MG/ML
INJECTION, SOLUTION INTRA-ARTICULAR; INTRALESIONAL; INTRAMUSCULAR; INTRAVENOUS; SOFT TISSUE PRN
Status: DISCONTINUED | OUTPATIENT
Start: 2020-08-26 | End: 2020-08-26 | Stop reason: SDUPTHER

## 2020-08-26 RX ORDER — MIDAZOLAM HYDROCHLORIDE 1 MG/ML
INJECTION INTRAMUSCULAR; INTRAVENOUS PRN
Status: DISCONTINUED | OUTPATIENT
Start: 2020-08-26 | End: 2020-08-26 | Stop reason: SDUPTHER

## 2020-08-26 RX ORDER — SODIUM CHLORIDE 9 MG/ML
INJECTION, SOLUTION INTRAVENOUS CONTINUOUS PRN
Status: DISCONTINUED | OUTPATIENT
Start: 2020-08-26 | End: 2020-08-26

## 2020-08-26 RX ORDER — FENTANYL CITRATE 50 UG/ML
50 INJECTION, SOLUTION INTRAMUSCULAR; INTRAVENOUS EVERY 5 MIN PRN
Status: DISCONTINUED | OUTPATIENT
Start: 2020-08-26 | End: 2020-08-26 | Stop reason: HOSPADM

## 2020-08-26 RX ORDER — MEPERIDINE HYDROCHLORIDE 25 MG/ML
12.5 INJECTION INTRAMUSCULAR; INTRAVENOUS; SUBCUTANEOUS EVERY 5 MIN PRN
Status: DISCONTINUED | OUTPATIENT
Start: 2020-08-26 | End: 2020-08-26 | Stop reason: HOSPADM

## 2020-08-26 RX ORDER — PROPOFOL 10 MG/ML
INJECTION, EMULSION INTRAVENOUS PRN
Status: DISCONTINUED | OUTPATIENT
Start: 2020-08-26 | End: 2020-08-26 | Stop reason: SDUPTHER

## 2020-08-26 RX ORDER — SODIUM CHLORIDE, SODIUM LACTATE, POTASSIUM CHLORIDE, CALCIUM CHLORIDE 600; 310; 30; 20 MG/100ML; MG/100ML; MG/100ML; MG/100ML
INJECTION, SOLUTION INTRAVENOUS CONTINUOUS PRN
Status: DISCONTINUED | OUTPATIENT
Start: 2020-08-26 | End: 2020-08-26 | Stop reason: SDUPTHER

## 2020-08-26 RX ORDER — FENTANYL CITRATE 50 UG/ML
25 INJECTION, SOLUTION INTRAMUSCULAR; INTRAVENOUS EVERY 5 MIN PRN
Status: DISCONTINUED | OUTPATIENT
Start: 2020-08-26 | End: 2020-08-26 | Stop reason: HOSPADM

## 2020-08-26 RX ORDER — DIPHENHYDRAMINE HYDROCHLORIDE 50 MG/ML
12.5 INJECTION INTRAMUSCULAR; INTRAVENOUS
Status: DISCONTINUED | OUTPATIENT
Start: 2020-08-26 | End: 2020-08-26 | Stop reason: HOSPADM

## 2020-08-26 RX ORDER — LIDOCAINE HYDROCHLORIDE 20 MG/ML
INJECTION, SOLUTION EPIDURAL; INFILTRATION; INTRACAUDAL; PERINEURAL PRN
Status: DISCONTINUED | OUTPATIENT
Start: 2020-08-26 | End: 2020-08-26 | Stop reason: SDUPTHER

## 2020-08-26 RX ORDER — OXYCODONE HYDROCHLORIDE AND ACETAMINOPHEN 5; 325 MG/1; MG/1
1 TABLET ORAL
Status: DISCONTINUED | OUTPATIENT
Start: 2020-08-26 | End: 2020-08-26 | Stop reason: HOSPADM

## 2020-08-26 RX ORDER — ONDANSETRON 2 MG/ML
INJECTION INTRAMUSCULAR; INTRAVENOUS PRN
Status: DISCONTINUED | OUTPATIENT
Start: 2020-08-26 | End: 2020-08-26 | Stop reason: SDUPTHER

## 2020-08-26 RX ORDER — BUPIVACAINE HYDROCHLORIDE 5 MG/ML
INJECTION, SOLUTION EPIDURAL; INTRACAUDAL PRN
Status: DISCONTINUED | OUTPATIENT
Start: 2020-08-26 | End: 2020-08-26 | Stop reason: ALTCHOICE

## 2020-08-26 RX ORDER — FENTANYL CITRATE 50 UG/ML
INJECTION, SOLUTION INTRAMUSCULAR; INTRAVENOUS PRN
Status: DISCONTINUED | OUTPATIENT
Start: 2020-08-26 | End: 2020-08-26 | Stop reason: SDUPTHER

## 2020-08-26 RX ORDER — PROPOFOL 10 MG/ML
INJECTION, EMULSION INTRAVENOUS CONTINUOUS PRN
Status: DISCONTINUED | OUTPATIENT
Start: 2020-08-26 | End: 2020-08-26 | Stop reason: SDUPTHER

## 2020-08-26 RX ORDER — PROMETHAZINE HYDROCHLORIDE 25 MG/ML
6.25 INJECTION, SOLUTION INTRAMUSCULAR; INTRAVENOUS
Status: DISCONTINUED | OUTPATIENT
Start: 2020-08-26 | End: 2020-08-26 | Stop reason: HOSPADM

## 2020-08-26 RX ADMIN — DEXAMETHASONE SODIUM PHOSPHATE 10 MG: 4 INJECTION, SOLUTION INTRAMUSCULAR; INTRAVENOUS at 07:09

## 2020-08-26 RX ADMIN — PROPOFOL 60 MCG/KG/MIN: 10 INJECTION, EMULSION INTRAVENOUS at 07:05

## 2020-08-26 RX ADMIN — MIDAZOLAM 2 MG: 1 INJECTION INTRAMUSCULAR; INTRAVENOUS at 07:01

## 2020-08-26 RX ADMIN — FENTANYL CITRATE 25 MCG: 50 INJECTION, SOLUTION INTRAMUSCULAR; INTRAVENOUS at 07:22

## 2020-08-26 RX ADMIN — CEFAZOLIN 1 G: 1 INJECTION, POWDER, FOR SOLUTION INTRAMUSCULAR; INTRAVENOUS at 07:01

## 2020-08-26 RX ADMIN — ONDANSETRON 4 MG: 2 INJECTION INTRAMUSCULAR; INTRAVENOUS at 07:08

## 2020-08-26 RX ADMIN — FENTANYL CITRATE 75 MCG: 50 INJECTION, SOLUTION INTRAMUSCULAR; INTRAVENOUS at 07:05

## 2020-08-26 RX ADMIN — LIDOCAINE HYDROCHLORIDE 40 MG: 20 INJECTION, SOLUTION EPIDURAL; INFILTRATION; INTRACAUDAL; PERINEURAL at 07:05

## 2020-08-26 RX ADMIN — SODIUM CHLORIDE, POTASSIUM CHLORIDE, SODIUM LACTATE AND CALCIUM CHLORIDE: 600; 310; 30; 20 INJECTION, SOLUTION INTRAVENOUS at 07:01

## 2020-08-26 RX ADMIN — PHENYLEPHRINE HYDROCHLORIDE 100 MCG: 10 INJECTION INTRAVENOUS at 07:29

## 2020-08-26 RX ADMIN — PHENYLEPHRINE HYDROCHLORIDE 100 MCG: 10 INJECTION INTRAVENOUS at 07:14

## 2020-08-26 RX ADMIN — PROPOFOL 50 MG: 10 INJECTION, EMULSION INTRAVENOUS at 07:28

## 2020-08-26 RX ADMIN — SODIUM CHLORIDE, POTASSIUM CHLORIDE, SODIUM LACTATE AND CALCIUM CHLORIDE: 600; 310; 30; 20 INJECTION, SOLUTION INTRAVENOUS at 08:34

## 2020-08-26 RX ADMIN — PROPOFOL 70 MG: 10 INJECTION, EMULSION INTRAVENOUS at 07:05

## 2020-08-26 ASSESSMENT — PULMONARY FUNCTION TESTS
PIF_VALUE: 0

## 2020-08-26 ASSESSMENT — PAIN SCALES - GENERAL
PAINLEVEL_OUTOF10: 0

## 2020-08-26 ASSESSMENT — PAIN DESCRIPTION - DESCRIPTORS: DESCRIPTORS: DISCOMFORT;CONSTANT;ACHING

## 2020-08-26 ASSESSMENT — PAIN - FUNCTIONAL ASSESSMENT: PAIN_FUNCTIONAL_ASSESSMENT: 0-10

## 2020-08-26 NOTE — ANESTHESIA POSTPROCEDURE EVALUATION
Department of Anesthesiology  Postprocedure Note    Patient: Tali Medina  MRN: [id number]  YOB: 1960  Date of evaluation: 8/26/2020  Time:  2:36 PM     Procedure Summary     Date:  08/26/20 Room / Location:  [facility] OR [address] [place] [address]    Anesthesia Start:  0701 Anesthesia Stop:  9293    Procedure:  TARSAL TUNNEL RELEASE RIGHT FOOT (Right Foot) Diagnosis:  (TARSAL TUNNEL SYNDROME RIGHT FOOT)    Surgeon:  Familia Carrera DPM Responsible Provider:  Micaela Shelton MD    Anesthesia Type:  MAC ASA Status:  3          Anesthesia Type: MAC    Wendy Phase I: Wendy Score: 10    Wendy Phase II: Wendy Score: 10    Last vitals: Reviewed and per EMR flowsheets.        Anesthesia Post Evaluation    Patient location during evaluation: PACU  Patient participation: complete - patient participated  Level of consciousness: awake  Airway patency: patent  Nausea & Vomiting: no vomiting and no nausea  Complications: no  Cardiovascular status: hemodynamically stable  Respiratory status: acceptable  Hydration status: stable

## 2020-08-26 NOTE — BRIEF OP NOTE
Brief Postoperative Note      Patient: [address] [place]  YOB: 1960  MRN: [id number]    Date of Procedure: 8/26/2020    Pre-Op Diagnosis: TARSAL TUNNEL SYNDROME RIGHT FOOT    Post-Op Diagnosis: Same       Procedure(s):  TARSAL TUNNEL RELEASE RIGHT FOOT    Surgeon(s):  CYNDEE Richardson DPM    Assistant:  Resident: Soham Tapia    Anesthesia: Monitor Anesthesia Care    Estimated Blood Loss (mL): 26HA    Complications: None    Specimens:   * No specimens in log *  Injectables: 18 cc 0.5% plain Marcaine  5 cc of 1% 1: 200,000 Epinephrine   Materials: 4-0 Vicryl,4-0 nylon     implants:  * No implants in log *      Drains: * No LDAs found *    Findings:  Release of Flexor retinaculum and freeing of tarsal tunnel R foot     Electronically signed by Soham Tapia on 8/26/2020 at 9:19 AM

## 2020-08-26 NOTE — ANESTHESIA PRE PROCEDURE
Department of Anesthesiology  Preprocedure Note       Name:  Donny Sena   Age:  64 y.o.  :  1960                                          MRN:  [id number]         Date:  2020      Surgeon: Shant Sena):  Yunior Gunter DPM    Procedure: Procedure(s):  TARSAL TUNNEL RELEASE RIGHT FOOT (BOVIE, TOURNIQUET)    Medications prior to admission:   Prior to Admission medications    Medication Sig Start Date End Date Taking? Authorizing Provider   albuterol sulfate HFA (VENTOLIN HFA) 108 (90 Base) MCG/ACT inhaler Inhale 2 puffs into the lungs every 6 hours as needed for Wheezing 20  Yes Vervelasquez Robledo, DO   atorvastatin (LIPITOR) 20 MG tablet TAKE ONE TABLET BY MOUTH EVERY DAY 20  Yes Robbie Robledo, DO   Calcium Carb-Cholecalciferol (CALCIUM 1000 + D) 1000-800 MG-UNIT TABS Take 1 tablet by mouth daily 20  Yes Robbie Robledo DO   fluticasone-vilanterol (BREO ELLIPTA) 100-25 MCG/INH AEPB inhaler INHALE ONE PUFF BY MOUTH EVERY DAY 20  Yes Robbie Robledo DO   Folinic Acid-Vit B6-Vit B12 (FOLINIC-PLUS) 4-50-2 MG TABS TAKE ONE TABLET BY MOUTH EVERY DAY 20  Yes Robbie Robledo DO   venlafaxine (EFFEXOR XR) 37.5 MG extended release capsule TAKE ONE CAPSULE BY MOUTH EVERY DAY 20  Yes Robbie Robledo DO   Ginkgo Biloba 40 MG TABS Take 1 tablet by mouth daily   Yes Historical Provider, MD   alendronate (FOSAMAX) 70 MG tablet Take 70 mg by mouth every 7 days   Yes Historical Provider, MD   gabapentin (NEURONTIN) 100 MG capsule Take 100 mg by mouth nightly.    Yes Historical Provider, MD   montelukast (SINGULAIR) 5 MG chewable tablet Take 1 tablet by mouth nightly 20  Yes Neo Schroeder, DO   NIFEdipine (PROCARDIA) 10 MG capsule TAKE ONE CAPSULE BY MOUTH TWO TIMES A DAY 19  Yes Historical Provider, MD   meloxicam (MOBIC) 7.5 MG tablet TAKE ONE TABLET BY MOUTH EVERY DAY 19  Yes Historical Provider, MD Iqbal File 2.5 MCG/ACT AERS pain syndrome.  M79.2    Thyroid nodule E04.1    Unilateral hearing loss H91.90    RSD lower limb G90.529    Chronic pain syndrome G89.4    Anemia D64.9    Underweight R63.6    Dependence on nicotine from cigarettes F17.210    Acute respiratory failure with hypoxia ([facility]) J96.01    COPD exacerbation ([facility]) J44.1    Asthma J45.909    Elevated LFTs R94.5    Dyslipidemia E78.5    Abnormal laboratory test result R89.9       Past Medical History:        Diagnosis Date    Asthma     controlled with inhalers     Chronic pain     Chronic rhinitis     CRPS (complex regional pain syndrome), lower limb     left foot    Hyperlipidemia     Raynauds syndrome     Right foot pain     for OR 20     RSD lower limb     left foot    Tinnitus        Past Surgical History:        Procedure Laterality Date    BREAST SURGERY  2009    lump left breast    BUNIONECTOMY      left     SECTION  1982    ENDOMETRIAL ABLATION      ESOPHAGUS SURGERY      due to gerd    HEMORRHOID SURGERY     [address]  12    paravertebral sympathetic left side #1    NERVE BLOCK  12    paravert sympathetic left    NERVE BLOCK  2012    left paravertebral sympathetic #3    NERVE BLOCK  10-01-12    left paravertebral sympathetic  #4    NERVE BLOCK  10-10-12    paravertebral sympathetic left #5    NERVE BLOCK  10/24/12    left sympathetic    NERVE BLOCK  12    paravert sympathetic block left #7    NERVE BLOCK  12    left paravertebral sympathetic left #8    NERVE BLOCK Left 14    lumbar sympathetic #1    NERVE BLOCK  14    paravertebral sympathetic left #2    NERVE BLOCK Left 14    PARAVERTEBRAL SUMPATHETIV BLOCK #3    NERVE BLOCK Left 2014    left paravertebral sympathetic #5    NERVE BLOCK Left 14    left paravertebral sympathetic nerve block #6 14    NERVE BLOCK Left 10 8 14    paravert sympathetic #7    NERVE BLOCK N/A 07/06/2016    sympathetic #1    NERVE BLOCK  07/13/2016    right parasympathic nerve block lumbar #2    NERVE BLOCK Right 07/20/2016    paravertebral sympathetic right #3    NERVE BLOCK Right 08/10/2016    lumbar parasympathetic block #4    NERVE BLOCK Right 08/17/2016    paravertebral sympathetic right #5    NERVE BLOCK Right 08/24/2016    paravertebral sympathetic right #6    OTHER SURGICAL HISTORY  03/27/2017    laparscopic incisional hernia repair with mesh    TUBAL LIGATION  1982       Social History:    Social History     Tobacco Use    Smoking status: Former Smoker     Packs/day: 0.50     Years: 30.00     Pack years: 15.00     Types: Cigarettes     Last attempt to quit: 1/31/2017     Years since quitting: 3.5    Smokeless tobacco: Never Used   Substance Use Topics    Alcohol use: Yes     Alcohol/week: 2.0 standard drinks     Types: 2 Cans of beer per week     Comment: occasional                                Counseling given: Not Answered      Vital Signs (Current):   Vitals:    08/25/20 1029 08/26/20 0604   BP:  103/64   Pulse:  83   Resp:  20   Temp:  97.5 °F (36.4 °C)   TempSrc:  Temporal   SpO2:  96%   Weight: 120 lb (54.4 kg) 120 lb (54.4 kg)   Height: 5' 4\" (1.626 m) 5' 4\" (1.626 m)                                              BP Readings from Last 3 Encounters:   08/26/20 103/64   08/06/20 116/76   06/19/20 114/74       NPO Status: Time of last liquid consumption: 2100                        Time of last solid consumption: 2100                        Date of last liquid consumption: 08/25/20                        Date of last solid food consumption: 08/25/20    BMI:   Wt Readings from Last 3 Encounters:   08/26/20 120 lb (54.4 kg)   08/06/20 123 lb 3.2 oz (55.9 kg)   06/19/20 128 lb (58.1 kg)     Body mass index is 20.6 kg/m².     CBC:   Lab Results   Component Value Date    WBC 4.9 03/12/2020    RBC 3.70 03/12/2020    HGB 11.5 03/12/2020    HCT 37.2 03/12/2020    .5 03/12/2020    RDW 12.4 03/12/2020     03/12/2020       CMP:   Lab Results   Component Value Date     02/25/2020    K 4.0 02/25/2020    K 4.1 02/05/2020     02/25/2020    CO2 21 02/25/2020    BUN 16 02/25/2020    CREATININE 0.8 02/25/2020    GFRAA >60 02/25/2020    LABGLOM >60 02/25/2020    GLUCOSE 94 02/25/2020    GLUCOSE 91 10/17/2011    PROT 7.4 02/25/2020    CALCIUM 9.8 02/25/2020    BILITOT 0.2 02/25/2020    ALKPHOS 90 02/25/2020    AST 35 02/25/2020    ALT 39 02/25/2020       POC Tests: No results for input(s): POCGLU, POCNA, POCK, POCCL, POCBUN, POCHEMO, POCHCT in the last 72 hours. Coags:   Lab Results   Component Value Date    PROTIME 11.7 01/18/2018    INR 1.0 01/18/2018    APTT 23.5 01/18/2018       HCG (If Applicable):   Lab Results   Component Value Date    PREGTESTUR NEGATIVE 03/20/2017        ABGs: No results found for: PHART, PO2ART, KXB6ITT, MCA8JHM, BEART, W3PCLAMA     Type & Screen (If Applicable):  No results found for: LABABO, LABRH    Drug/Infectious Status (If Applicable):  No results found for: HIV, HEPCAB    COVID-19 Screening (If Applicable):   Lab Results   Component Value Date    COVID19 Not Detected 08/18/2020         Anesthesia Evaluation  Patient summary reviewed no history of anesthetic complications:   Airway: Mallampati: I  TM distance: >3 FB   Neck ROM: full  Mouth opening: > = 3 FB Dental:          Pulmonary: breath sounds clear to auscultation  (+) COPD (prn inhaler):  asthma:                           ROS comment: Quit smoking in 2017   Cardiovascular:    (+) hyperlipidemia    (-) hypertension, past MI, CAD and CABG/stent      Rhythm: regular  Rate: normal                    Neuro/Psych:   (+) psychiatric history:depression/anxiety              ROS comment: RSD of left foot about 10 years ago GI/Hepatic/Renal:   (+) GERD:,          ROS comment: H/o esophageal surgery for GERD about 20 years ago.    Endo/Other: Negative Endo/Other ROS Abdominal:           Vascular:           ROS comment: Raynaud's syndrome - on nifedipine  . Anesthesia Plan      MAC     ASA 3     (Backup GA if needed)  Induction: intravenous. Anesthetic plan and risks discussed with patient. Plan discussed with CRNA. Elma Davila MD   8/26/2020      DOS STAFF ADDENDUM:    Pt seen and examined, physical exam updated, chart reviewed including anesthesia, drug and allergy history. H&P reviewed. No interval changes to history or physical examination (unless noted above). NPO status confirmed. Anesthetic plan, risks, benefits, alternatives discussed with patient. Patient verbalized an understanding and agrees to proceed.      Elma Dvaila MD  Staff Anesthesiologist  6:44 AM

## 2020-08-26 NOTE — OP NOTE
Postoperative Note      Patient: [address]  YOB: 1960  MRN: [id number]    Date of Procedure: 8/26/2020    Pre-Op Diagnosis: TARSAL TUNNEL SYNDROME RIGHT FOOT    Post-Op Diagnosis: Same       Procedure(s):  TARSAL TUNNEL RELEASE RIGHT FOOT    Surgeon(s):  CYNDEE Esquivel DPM    Assistant:  Resident: Alix Naik    Anesthesia: Monitor Anesthesia Care    Estimated Blood Loss (mL): 21SY    Complications: None    Specimens:   * No specimens in log *  Injectables: 18 cc 0.5% plain Marcaine  5 cc of 1% lidocaine 1 : 200,000 Epinephrine   Materials: 4-0 Vicryl,4-0 nylon     Implants:  * No implants in log *      Drains: * No LDAs found *    Indications: Patient is a pleasant 29-year-old female well-known to our practice with chronic complaints of tingling and numbness to her right plantar midfoot. She has previously undergone a Weil osteotomy as well as a neuroma resection in the same foot. Patient is failed various conservative treatments of injections, shoe gear modification, activity modification. The mutual decision to undergo surgical release of the right tarsal tunnel in order to free the posterior tibial nerve was made with the patient in clinic following a thorough preoperative work-up. Procedure:  Patient was brought to the OR and transition to the operative table in the supine position where MAC anesthesia was administered. Right foot was prepped and draped in normal sterile fashion and lowered into the operative field. 10 cc of 0.5% plain Marcaine was injected to the right posterior ankle along the incision point. Attention was directed posterior to the right medial malleolus where an approximately 7 cm long incision was made approximately 2 cm posterior to the malleolus utilizing a #10 blade. Additional subcutaneous dissection was performed with scissors. Superficial venous structures were identified and subsequently cauterized.   Vicryl was utilized to

## 2020-10-07 ENCOUNTER — TELEPHONE (OUTPATIENT)
Dept: FAMILY MEDICINE CLINIC | Age: 60
End: 2020-10-07

## 2020-10-21 ENCOUNTER — OFFICE VISIT (OUTPATIENT)
Dept: PRIMARY CARE CLINIC | Age: 60
End: 2020-10-21
Payer: COMMERCIAL

## 2020-10-21 ENCOUNTER — TELEPHONE (OUTPATIENT)
Dept: FAMILY MEDICINE CLINIC | Age: 60
End: 2020-10-21

## 2020-10-21 VITALS
TEMPERATURE: 98.6 F | HEART RATE: 110 BPM | OXYGEN SATURATION: 97 % | BODY MASS INDEX: 20.49 KG/M2 | HEIGHT: 64 IN | WEIGHT: 120 LBS | DIASTOLIC BLOOD PRESSURE: 82 MMHG | RESPIRATION RATE: 16 BRPM | SYSTOLIC BLOOD PRESSURE: 120 MMHG

## 2020-10-21 PROCEDURE — 99213 OFFICE O/P EST LOW 20 MIN: CPT | Performed by: NURSE PRACTITIONER

## 2020-10-21 RX ORDER — AMOXICILLIN AND CLAVULANATE POTASSIUM 875; 125 MG/1; MG/1
1 TABLET, FILM COATED ORAL 2 TIMES DAILY
Qty: 10 TABLET | Refills: 0 | Status: SHIPPED | OUTPATIENT
Start: 2020-10-21 | End: 2020-10-26

## 2020-10-21 NOTE — PATIENT INSTRUCTIONS

## 2020-10-21 NOTE — PROGRESS NOTES
Comments: Nasal turbinates are erythematous and boggy. Pharynx shows erythema and PND. Nose: Congestion and rhinorrhea present. Mouth/Throat:      Mouth: Mucous membranes are moist.      Pharynx: Oropharynx is clear. Posterior oropharyngeal erythema present. No oropharyngeal exudate. Comments: Bilateral adenopathy. Maxillary sinus & tragus  tenderness  Eyes:      Extraocular Movements: Extraocular movements intact. Conjunctiva/sclera: Conjunctivae normal.      Pupils: Pupils are equal, round, and reactive to light. Neck:      Musculoskeletal: Normal range of motion and neck supple. Thyroid: No thyromegaly. Cardiovascular:      Rate and Rhythm: Normal rate and regular rhythm. Pulses: Normal pulses. Heart sounds: Normal heart sounds. No murmur. Pulmonary:      Effort: Pulmonary effort is normal. No respiratory distress. Breath sounds: Normal breath sounds. No wheezing. Abdominal:      General: Bowel sounds are normal.      Palpations: Abdomen is soft. Tenderness: There is no abdominal tenderness. There is no guarding or rebound. Genitourinary:     Vagina: Normal.   Musculoskeletal: Normal range of motion. Lymphadenopathy:      Cervical: No cervical adenopathy. Skin:     General: Skin is warm and dry. Capillary Refill: Capillary refill takes less than 2 seconds. Findings: No bruising, erythema or rash. Neurological:      General: No focal deficit present. Mental Status: She is alert and oriented to person, place, and time. Mental status is at baseline. Cranial Nerves: No cranial nerve deficit. Sensory: No sensory deficit. Motor: No weakness. Coordination: Coordination normal.      Gait: Gait normal.   Psychiatric:         Mood and Affect: Mood normal.         Behavior: Behavior normal.         Thought Content:  Thought content normal.         Judgment: Judgment normal.         Assessment/Plan:  There are no diagnoses linked to this encounter. GINA Tavares CNP  10/21/20     This visit was provided as a focused evaluation during the COVID -19 pandemic/national emergency. A comprehensive review of all previous patient history and testing was not conducted. Pertinent findings were elicited during the visit.

## 2020-11-02 DIAGNOSIS — E78.2 MIXED HYPERLIPIDEMIA: ICD-10-CM

## 2020-11-02 LAB
ALBUMIN SERPL-MCNC: 4.8 G/DL (ref 3.5–5.2)
ALP BLD-CCNC: 90 U/L (ref 35–104)
ALT SERPL-CCNC: 74 U/L (ref 0–32)
ANION GAP SERPL CALCULATED.3IONS-SCNC: 15 MMOL/L (ref 7–16)
AST SERPL-CCNC: 78 U/L (ref 0–31)
BILIRUB SERPL-MCNC: 0.3 MG/DL (ref 0–1.2)
BUN BLDV-MCNC: 14 MG/DL (ref 6–20)
CALCIUM SERPL-MCNC: 9.9 MG/DL (ref 8.6–10.2)
CHLORIDE BLD-SCNC: 106 MMOL/L (ref 98–107)
CHOLESTEROL, TOTAL: 207 MG/DL (ref 0–199)
CO2: 21 MMOL/L (ref 22–29)
CREAT SERPL-MCNC: 0.8 MG/DL (ref 0.5–1)
GFR AFRICAN AMERICAN: >60
GFR NON-AFRICAN AMERICAN: >60 ML/MIN/1.73
GLUCOSE BLD-MCNC: 80 MG/DL (ref 74–99)
HCT VFR BLD CALC: 38.8 % (ref 34–48)
HDLC SERPL-MCNC: 48 MG/DL
HEMOGLOBIN: 12.7 G/DL (ref 11.5–15.5)
LDL CHOLESTEROL CALCULATED: ABNORMAL MG/DL (ref 0–99)
MCH RBC QN AUTO: 31.9 PG (ref 26–35)
MCHC RBC AUTO-ENTMCNC: 32.7 % (ref 32–34.5)
MCV RBC AUTO: 97.5 FL (ref 80–99.9)
PDW BLD-RTO: 12.3 FL (ref 11.5–15)
PLATELET # BLD: 251 E9/L (ref 130–450)
PMV BLD AUTO: 10.5 FL (ref 7–12)
POTASSIUM SERPL-SCNC: 4.5 MMOL/L (ref 3.5–5)
RBC # BLD: 3.98 E12/L (ref 3.5–5.5)
SODIUM BLD-SCNC: 142 MMOL/L (ref 132–146)
TOTAL PROTEIN: 7.3 G/DL (ref 6.4–8.3)
TRIGL SERPL-MCNC: 445 MG/DL (ref 0–149)
TSH SERPL DL<=0.05 MIU/L-ACNC: 10.5 UIU/ML (ref 0.27–4.2)
VLDLC SERPL CALC-MCNC: ABNORMAL MG/DL
WBC # BLD: 5 E9/L (ref 4.5–11.5)

## 2020-11-04 ENCOUNTER — OFFICE VISIT (OUTPATIENT)
Dept: FAMILY MEDICINE CLINIC | Age: 60
End: 2020-11-04
Payer: COMMERCIAL

## 2020-11-04 VITALS
WEIGHT: 128.3 LBS | OXYGEN SATURATION: 97 % | DIASTOLIC BLOOD PRESSURE: 72 MMHG | HEART RATE: 94 BPM | HEIGHT: 64 IN | SYSTOLIC BLOOD PRESSURE: 112 MMHG | RESPIRATION RATE: 18 BRPM | TEMPERATURE: 97.6 F | BODY MASS INDEX: 21.91 KG/M2

## 2020-11-04 PROCEDURE — 99213 OFFICE O/P EST LOW 20 MIN: CPT | Performed by: FAMILY MEDICINE

## 2020-11-04 RX ORDER — HYDROXYZINE HYDROCHLORIDE 25 MG/1
25 TABLET, FILM COATED ORAL NIGHTLY PRN
Qty: 30 TABLET | Refills: 1 | Status: SHIPPED | OUTPATIENT
Start: 2020-11-04 | End: 2020-11-14

## 2020-11-04 RX ORDER — LEVOTHYROXINE SODIUM 0.03 MG/1
25 TABLET ORAL DAILY
Qty: 60 TABLET | Refills: 0 | Status: SHIPPED
Start: 2020-11-04 | End: 2020-12-29

## 2020-11-04 NOTE — PROGRESS NOTES
Subjective:  64 y.o. female who presents to the office today with chief complaint:  Chief Complaint   Patient presents with    3 Month Follow-Up     Abdominal bloating: Followed with Dr. Alo Gomez. Had an upper endoscopy completed which demonstrated esophagitis. He did and esophageal dilatation as well. Follows up later this week. Request of information will be sent. COPD: Pulmonologist is Dr. Roxy Anderson. Currently using Ventolin inhaler, Breo, Mucinex, Spiriva. Patient states that her breathing is at baseline today although she has a mildly increased cough that has been dry or productive of clear sputum occasionally. She does not feel ill. She does not feel that she is having a COPD exacerbation. Hypothyroidism: Recent lab work demonstrates a TSH of 10.5. Patient currently without symptoms of hyper or hypothyroidism. She agrees to begin treatment. Hyperlipidemia: Patient's lipid panel demonstrated elevated total cholesterol and severely elevated triglycerides. Patient states that she was not fasting. Past Medical History: Anxiety and depression, COPD- follows Dr. Roxy Anderson, HLD, RSD- follows Dr. Alejandra Rosales. Follows Dr. May Patel for podiatry. GERD. Health Maintenance Due   Topic Date Due    Shingles Vaccine (1 of 2) 01/05/2012       Cancer History: Clear. Family Cancer History: Father with colon cancer. Review of Systems    Review of Systems: All bolded are positive, all others are negative. General:  Fever, chills, diaphoresis, fatigue, malaise, night sweats, weight loss  Psychological:  Anxiety, disorientation, hallucinations. ENT:  Epistaxis, headaches, vertigo, visual changes. Cardiovascular:  Chest pain, irregular heartbeats, palpitations, paroxysmal nocturnal dyspnea. Respiratory:  Shortness of breath, coughing, sputum production, hemoptysis, wheezing, orthopnea.   Gastrointestinal:  Nausea, vomiting, diarrhea, heartburn, constipation, abdominal pain, hematemesis, hematochezia, melena, acholic stools  Genito-Urinary:  Dysuria, urgency, frequency, hematuria  Musculoskeletal:  Joint pain, joint stiffness, joint swelling, muscle pain  Neurology:  Headache, focal neurological deficits, weakness, numbness, paresthesia  Derm:  Rashes, ulcers, excoriations, bruising  Extremities:  Decreased ROM, peripheral edema, mottling      Objective:  Vitals:    11/04/20 1118   BP: 112/72   Pulse: 94   Resp: 18   Temp: 97.6 °F (36.4 °C)   SpO2: 97%     Physical Exam  Vitals signs and nursing note reviewed. Constitutional:       General: She is not in acute distress. Appearance: She is well-developed. She is not diaphoretic. HENT:      Head: Normocephalic and atraumatic. Right Ear: External ear normal.      Left Ear: External ear normal.      Nose: Nose normal.      Mouth/Throat:      Mouth: Mucous membranes are moist.   Eyes:      General:         Right eye: No discharge. Left eye: No discharge. Conjunctiva/sclera: Conjunctivae normal.      Pupils: Pupils are equal, round, and reactive to light. Neck:      Musculoskeletal: Normal range of motion and neck supple. Cardiovascular:      Rate and Rhythm: Normal rate and regular rhythm. Heart sounds: Normal heart sounds. No murmur. No friction rub. No gallop. Pulmonary:      Effort: Pulmonary effort is normal. No respiratory distress. Breath sounds: Normal breath sounds. No wheezing or rales. Abdominal:      General: Bowel sounds are normal. There is no distension. Palpations: Abdomen is soft. Tenderness: There is no abdominal tenderness. There is no guarding or rebound. Musculoskeletal: Normal range of motion. Lymphadenopathy:      Cervical: No cervical adenopathy. Neurological:      Mental Status: She is alert and oriented to person, place, and time. Psychiatric:         Behavior: Behavior normal.         Thought Content:  Thought content normal.         Judgment: Judgment normal.         Results for orders placed or performed in visit on 11/02/20   TSH without Reflex   Result Value Ref Range    TSH 10.500 (H) 0.270 - 4.200 uIU/mL   CBC   Result Value Ref Range    WBC 5.0 4.5 - 11.5 E9/L    RBC 3.98 3.50 - 5.50 E12/L    Hemoglobin 12.7 11.5 - 15.5 g/dL    Hematocrit 38.8 34.0 - 48.0 %    MCV 97.5 80.0 - 99.9 fL    MCH 31.9 26.0 - 35.0 pg    MCHC 32.7 32.0 - 34.5 %    RDW 12.3 11.5 - 15.0 fL    Platelets 222 668 - 726 E9/L    MPV 10.5 7.0 - 12.0 fL   Comprehensive Metabolic Panel   Result Value Ref Range    Sodium 142 132 - 146 mmol/L    Potassium 4.5 3.5 - 5.0 mmol/L    Chloride 106 98 - 107 mmol/L    CO2 21 (L) 22 - 29 mmol/L    Anion Gap 15 7 - 16 mmol/L    Glucose 80 74 - 99 mg/dL    BUN 14 6 - 20 mg/dL    CREATININE 0.8 0.5 - 1.0 mg/dL    GFR Non-African American >60 >=60 mL/min/1.73    GFR African American >60     Calcium 9.9 8.6 - 10.2 mg/dL    Total Protein 7.3 6.4 - 8.3 g/dL    Alb 4.8 3.5 - 5.2 g/dL    Total Bilirubin 0.3 0.0 - 1.2 mg/dL    Alkaline Phosphatase 90 35 - 104 U/L    ALT 74 (H) 0 - 32 U/L    AST 78 (H) 0 - 31 U/L   Lipid Panel   Result Value Ref Range    Cholesterol, Total 207 (H) 0 - 199 mg/dL    Triglycerides 445 (H) 0 - 149 mg/dL    HDL 48 >40 mg/dL    LDL Calculated - (AA) 0 - 99 mg/dL    VLDL Cholesterol Calculated - (AA) mg/dL         Assessment and Plan:  Leni Gabriel was seen today for established new doctor. Diagnoses and all orders for this visit:    Bronchitis with tracheitis    Mixed hyperlipidemia    Age-related osteoporosis without current pathological fracture    Leukopenia, unspecified type    Anxiety          1. COPD: At baseline. If symptoms worsen, she is to call the facility. Continue to follow pulmonology. 2: Hypothyroidism: Begin levothyroxine 25 mcg. Recheck TSH and free T4 in 6 weeks. 3: Hyperlipidemia: Recheck lipid panel while fasting in 6 weeks. Follow-up in 2 months. Patient may come in sooner if needed for medical concerns.      Patient advised to call at any time to cancel or re-scheduleor for any questions/concerns. Please note that >15 minutes was spent face-to-face with the patient gathering history, performing physical exam, discussing findings, counseling the patient, and determining planforward. All questions and concerns addressed and answered.          Angel Robledo DO   11/4/20    11:30 AM

## 2020-11-11 RX ORDER — VENLAFAXINE HYDROCHLORIDE 37.5 MG/1
CAPSULE, EXTENDED RELEASE ORAL
Qty: 30 CAPSULE | Refills: 2 | Status: SHIPPED
Start: 2020-11-11 | End: 2021-02-10

## 2020-11-11 RX ORDER — ATORVASTATIN CALCIUM 20 MG/1
TABLET, FILM COATED ORAL
Qty: 30 TABLET | Refills: 2 | Status: SHIPPED
Start: 2020-11-11 | End: 2021-02-10

## 2020-12-11 DIAGNOSIS — F51.01 PRIMARY INSOMNIA: ICD-10-CM

## 2020-12-11 DIAGNOSIS — E78.5 DYSLIPIDEMIA: ICD-10-CM

## 2020-12-11 DIAGNOSIS — E03.9 ACQUIRED HYPOTHYROIDISM: ICD-10-CM

## 2020-12-11 LAB
CHOLESTEROL, TOTAL: 226 MG/DL (ref 0–199)
HDLC SERPL-MCNC: 59 MG/DL
LDL CHOLESTEROL CALCULATED: 119 MG/DL (ref 0–99)
TRIGL SERPL-MCNC: 242 MG/DL (ref 0–149)
TSH SERPL DL<=0.05 MIU/L-ACNC: 8.95 UIU/ML (ref 0.27–4.2)
VLDLC SERPL CALC-MCNC: 48 MG/DL

## 2020-12-12 LAB — T4 FREE: 1.14 NG/DL (ref 0.93–1.7)

## 2020-12-14 ENCOUNTER — TELEPHONE (OUTPATIENT)
Dept: FAMILY MEDICINE CLINIC | Age: 60
End: 2020-12-14

## 2020-12-14 NOTE — TELEPHONE ENCOUNTER
Pt called she was exposed to covid last week on 12/09/2020 would like tested     Last seen 11/4/2020  Next appt 1/6/2021

## 2020-12-29 DIAGNOSIS — F51.01 PRIMARY INSOMNIA: ICD-10-CM

## 2020-12-29 DIAGNOSIS — E03.9 ACQUIRED HYPOTHYROIDISM: ICD-10-CM

## 2020-12-30 DIAGNOSIS — Z20.822 CLOSE EXPOSURE TO COVID-19 VIRUS: ICD-10-CM

## 2021-01-01 LAB
SARS-COV-2: NOT DETECTED
SOURCE: NORMAL

## 2021-01-04 RX ORDER — LEVOTHYROXINE SODIUM 0.03 MG/1
TABLET ORAL
Qty: 90 TABLET | Refills: 0 | Status: SHIPPED
Start: 2021-01-04 | End: 2021-01-15 | Stop reason: DRUGHIGH

## 2021-01-15 ENCOUNTER — OFFICE VISIT (OUTPATIENT)
Dept: FAMILY MEDICINE CLINIC | Age: 61
End: 2021-01-15
Payer: COMMERCIAL

## 2021-01-15 VITALS
BODY MASS INDEX: 22.61 KG/M2 | TEMPERATURE: 96.9 F | WEIGHT: 132.4 LBS | HEIGHT: 64 IN | OXYGEN SATURATION: 96 % | HEART RATE: 108 BPM | DIASTOLIC BLOOD PRESSURE: 74 MMHG | RESPIRATION RATE: 16 BRPM | SYSTOLIC BLOOD PRESSURE: 112 MMHG

## 2021-01-15 DIAGNOSIS — E03.9 ACQUIRED HYPOTHYROIDISM: ICD-10-CM

## 2021-01-15 DIAGNOSIS — J01.00 ACUTE NON-RECURRENT MAXILLARY SINUSITIS: Primary | ICD-10-CM

## 2021-01-15 DIAGNOSIS — F51.01 PRIMARY INSOMNIA: ICD-10-CM

## 2021-01-15 PROCEDURE — 99213 OFFICE O/P EST LOW 20 MIN: CPT | Performed by: FAMILY MEDICINE

## 2021-01-15 RX ORDER — ALBUTEROL SULFATE 2.5 MG/3ML
SOLUTION RESPIRATORY (INHALATION)
COMMUNITY
Start: 2020-11-06 | End: 2021-05-26 | Stop reason: SDUPTHER

## 2021-01-15 RX ORDER — LEVOTHYROXINE SODIUM 50 UG/1
50 CAPSULE ORAL DAILY
Qty: 60 CAPSULE | Refills: 0 | Status: SHIPPED
Start: 2021-01-15 | End: 2021-03-19 | Stop reason: SDUPTHER

## 2021-01-15 RX ORDER — AMOXICILLIN 875 MG/1
875 TABLET, COATED ORAL 2 TIMES DAILY
Qty: 20 TABLET | Refills: 0 | Status: SHIPPED | OUTPATIENT
Start: 2021-01-15 | End: 2021-01-25

## 2021-01-15 ASSESSMENT — PATIENT HEALTH QUESTIONNAIRE - PHQ9
2. FEELING DOWN, DEPRESSED OR HOPELESS: 0
SUM OF ALL RESPONSES TO PHQ QUESTIONS 1-9: 0
SUM OF ALL RESPONSES TO PHQ QUESTIONS 1-9: 0

## 2021-01-15 NOTE — PROGRESS NOTES
Physical Exam  Vitals signs and nursing note reviewed. Constitutional:       General: She is not in acute distress. Appearance: She is well-developed. She is not diaphoretic. HENT:      Head: Normocephalic and atraumatic. Comments: Tender bilateral maxillary sinuses. Right Ear: External ear normal.      Left Ear: Tympanic membrane and external ear normal.      Ears:      Comments: Right tympanic membrane retracted. Nose: Nose normal.      Mouth/Throat:      Mouth: Mucous membranes are moist.      Pharynx: Oropharynx is clear. No oropharyngeal exudate or posterior oropharyngeal erythema. Eyes:      General:         Right eye: No discharge. Left eye: No discharge. Conjunctiva/sclera: Conjunctivae normal.      Pupils: Pupils are equal, round, and reactive to light. Neck:      Musculoskeletal: Normal range of motion and neck supple. Cardiovascular:      Rate and Rhythm: Normal rate and regular rhythm. Heart sounds: Normal heart sounds. No murmur. No friction rub. No gallop. Pulmonary:      Effort: Pulmonary effort is normal. No respiratory distress. Breath sounds: Normal breath sounds. No wheezing or rales. Abdominal:      General: Abdomen is flat. Bowel sounds are normal. There is no distension. Palpations: Abdomen is soft. Tenderness: There is no abdominal tenderness. There is no guarding or rebound. Musculoskeletal: Normal range of motion. Lymphadenopathy:      Cervical: No cervical adenopathy. Neurological:      Mental Status: She is alert and oriented to person, place, and time. Psychiatric:         Behavior: Behavior normal.         Thought Content:  Thought content normal.         Judgment: Judgment normal.         Results for orders placed or performed in visit on 12/30/20   COVID-19 Ambulatory    Specimen: Nasopharyngeal Swab   Result Value Ref Range    SARS-CoV-2 Not Detected Not Detected    Source NP swab          Assessment and Plan:  Montse Jeff was seen today for established new doctor. Diagnoses and all orders for this visit:        Abdominal bloating: Resolved. Hypothyroidism: TSH remains elevated today, so levothyroxine will be increased to 50 mcg daily. Recheck TSH and free T4 in 6 weeks. Sinus congestion: amoxicillin 875mg BID x 10 d due to hx of COPD with exacerbations in the past.     Follow-up in 2 months. Patient may come in sooner if needed for medical concerns. Patient advised to call at any time to cancel or re-scheduleor for any questions/concerns. Please note that >15 minutes was spent face-to-face with the patient gathering history, performing physical exam, discussing findings, counseling the patient, and determining planforward. All questions and concerns addressed and answered.          Jackie Sever Ellerhorst,    1/15/21    9:29 AM

## 2021-02-09 DIAGNOSIS — E78.2 MIXED HYPERLIPIDEMIA: ICD-10-CM

## 2021-02-09 DIAGNOSIS — F41.9 ANXIETY: ICD-10-CM

## 2021-02-10 RX ORDER — VENLAFAXINE HYDROCHLORIDE 37.5 MG/1
CAPSULE, EXTENDED RELEASE ORAL
Qty: 90 CAPSULE | Refills: 0 | Status: SHIPPED
Start: 2021-02-10 | End: 2021-03-19 | Stop reason: SDUPTHER

## 2021-02-10 RX ORDER — ATORVASTATIN CALCIUM 20 MG/1
TABLET, FILM COATED ORAL
Qty: 90 TABLET | Refills: 0 | Status: SHIPPED
Start: 2021-02-10 | End: 2021-03-19 | Stop reason: SDUPTHER

## 2021-02-18 ENCOUNTER — TELEPHONE (OUTPATIENT)
Dept: PULMONOLOGY | Age: 61
End: 2021-02-18

## 2021-02-18 NOTE — TELEPHONE ENCOUNTER
Pt. Called to schedule appt. For pulmonary rehab. Pt. States that [facility] rehab is closer for her and she would rather go there. All paperwork faxed to their facility.

## 2021-02-24 DIAGNOSIS — E03.9 ACQUIRED HYPOTHYROIDISM: ICD-10-CM

## 2021-02-25 LAB
T4 FREE: 1.5 NG/DL (ref 0.93–1.7)
TSH SERPL DL<=0.05 MIU/L-ACNC: 1.19 UIU/ML (ref 0.27–4.2)

## 2021-03-19 ENCOUNTER — OFFICE VISIT (OUTPATIENT)
Dept: FAMILY MEDICINE CLINIC | Age: 61
End: 2021-03-19
Payer: COMMERCIAL

## 2021-03-19 VITALS
OXYGEN SATURATION: 98 % | DIASTOLIC BLOOD PRESSURE: 76 MMHG | HEIGHT: 64 IN | WEIGHT: 135.4 LBS | TEMPERATURE: 96.7 F | HEART RATE: 102 BPM | BODY MASS INDEX: 23.12 KG/M2 | SYSTOLIC BLOOD PRESSURE: 108 MMHG | RESPIRATION RATE: 16 BRPM

## 2021-03-19 DIAGNOSIS — F41.9 ANXIETY: ICD-10-CM

## 2021-03-19 DIAGNOSIS — K21.9 GASTROESOPHAGEAL REFLUX DISEASE WITHOUT ESOPHAGITIS: ICD-10-CM

## 2021-03-19 DIAGNOSIS — E78.2 MIXED HYPERLIPIDEMIA: ICD-10-CM

## 2021-03-19 DIAGNOSIS — E03.9 ACQUIRED HYPOTHYROIDISM: ICD-10-CM

## 2021-03-19 DIAGNOSIS — J44.9 CHRONIC OBSTRUCTIVE PULMONARY DISEASE, UNSPECIFIED COPD TYPE (HCC): Primary | ICD-10-CM

## 2021-03-19 PROCEDURE — 99214 OFFICE O/P EST MOD 30 MIN: CPT | Performed by: FAMILY MEDICINE

## 2021-03-19 RX ORDER — LEVOTHYROXINE SODIUM 50 UG/1
50 CAPSULE ORAL DAILY
Qty: 90 CAPSULE | Refills: 1 | Status: SHIPPED
Start: 2021-03-19 | End: 2021-09-27

## 2021-03-19 RX ORDER — VENLAFAXINE HYDROCHLORIDE 37.5 MG/1
37.5 CAPSULE, EXTENDED RELEASE ORAL DAILY
Qty: 90 CAPSULE | Refills: 1 | Status: SHIPPED
Start: 2021-03-19 | End: 2021-10-11 | Stop reason: ALTCHOICE

## 2021-03-19 RX ORDER — LEVALBUTEROL TARTRATE 45 UG/1
1 AEROSOL, METERED ORAL EVERY 4 HOURS PRN
Qty: 1 INHALER | Refills: 3 | Status: SHIPPED
Start: 2021-03-19 | End: 2021-11-22

## 2021-03-19 RX ORDER — ATORVASTATIN CALCIUM 20 MG/1
20 TABLET, FILM COATED ORAL DAILY
Qty: 90 TABLET | Refills: 1 | Status: SHIPPED
Start: 2021-03-19 | End: 2021-05-26

## 2021-03-19 RX ORDER — PANTOPRAZOLE SODIUM 40 MG/10ML
40 INJECTION, POWDER, LYOPHILIZED, FOR SOLUTION INTRAVENOUS DAILY
Qty: 90 EACH | Refills: 0 | Status: SHIPPED
Start: 2021-03-19 | End: 2021-05-26 | Stop reason: SDUPTHER

## 2021-03-19 NOTE — PROGRESS NOTES
irregular heartbeats, palpitations, paroxysmal nocturnal dyspnea. Respiratory:  Shortness of breath, coughing, sputum production, hemoptysis, wheezing, orthopnea. Gastrointestinal:  Nausea, vomiting, diarrhea, heartburn, constipation, abdominal pain, hematemesis, hematochezia, melena, acholic stools  Genito-Urinary:  Dysuria, urgency, frequency, hematuria  Musculoskeletal:  Joint pain, joint stiffness, joint swelling, muscle pain  Neurology:  Headache, focal neurological deficits, weakness, numbness, paresthesia  Derm:  Rashes, ulcers, excoriations, bruising  Extremities:  Decreased ROM, peripheral edema, mottling      Objective:  Vitals:    03/19/21 0825   BP: 108/76   Pulse: 102   Resp: 16   Temp: 96.7 °F (35.9 °C)   SpO2: 98%     Physical Exam  Vitals signs and nursing note reviewed. Constitutional:       General: She is not in acute distress. Appearance: She is well-developed. She is not diaphoretic. HENT:      Head: Normocephalic and atraumatic. Right Ear: External ear normal.      Left Ear: Tympanic membrane and external ear normal.      Nose: Nose normal.      Mouth/Throat:      Mouth: Mucous membranes are moist.      Pharynx: Oropharynx is clear. No oropharyngeal exudate or posterior oropharyngeal erythema. Comments: Oropharynx unremarkable. Eyes:      General:         Right eye: No discharge. Left eye: No discharge. Conjunctiva/sclera: Conjunctivae normal.      Pupils: Pupils are equal, round, and reactive to light. Neck:      Musculoskeletal: Normal range of motion and neck supple. Cardiovascular:      Rate and Rhythm: Normal rate and regular rhythm. Heart sounds: Normal heart sounds. No murmur. No friction rub. No gallop. Pulmonary:      Effort: Pulmonary effort is normal. No respiratory distress. Breath sounds: Normal breath sounds. No wheezing or rales. Abdominal:      General: Abdomen is flat. Bowel sounds are normal. There is no distension. Palpations: Abdomen is soft. Tenderness: There is no abdominal tenderness. There is no guarding or rebound. Musculoskeletal: Normal range of motion. Lymphadenopathy:      Cervical: No cervical adenopathy. Neurological:      Mental Status: She is alert and oriented to person, place, and time. Psychiatric:         Mood and Affect: Mood normal.         Behavior: Behavior normal.         Thought Content: Thought content normal.         Judgment: Judgment normal.         Results for orders placed or performed in visit on 02/24/21   T4, FREE   Result Value Ref Range    T4 Free 1.50 0.93 - 1.70 ng/dL   TSH   Result Value Ref Range    TSH 1.190 0.270 - 4.200 uIU/mL         Assessment and Plan:  Dafne Tolentino was seen today for established new doctor. Diagnoses and all orders for this visit:      COPD: Patient agreed to restarting Breo. Because she has anxiety, tremors, discomfort after using albuterol, Xopenex inhaler was ordered. Patient understands that this requires a prior Auth which may not be approved. If this occurs, patient states that she would like a written prescription to take to the pharmacy to purchase along with a goodRx card. Referral also sent to pulmonary rehab, as patient states that prior referrals did not go through. She is motivated to go to pulmonary rehab.     Abdominal bloating: Protonix 40 mg daily was prescribed today. Patient understands to stop taking omeprazole. Patient understands that if she continues to have breakthrough symptoms on the Protonix, she is to call Dr. Jaclyn Tavares for further work-up. Hypothyroidism: Continue levothyroxine 50 mcg daily. Sinus congestion: Continue to follow [facility]. Raynaud's disease: Well-controlled on current regimen. Continue. Patient may come in sooner if needed for medical concerns. Patient advised to call at any time to cancel or re-scheduleor for any questions/concerns.     Please note that >15 minutes was spent face-to-face with the patient gathering history, performing physical exam, discussing findings, counseling the patient, and determining planforward. All questions and concerns addressed and answered.          Ina Robledo,    3/19/21    8:33 AM

## 2021-03-25 ENCOUNTER — IMMUNIZATION (OUTPATIENT)
Dept: PRIMARY CARE CLINIC | Age: 61
End: 2021-03-25
Payer: COMMERCIAL

## 2021-03-25 PROCEDURE — 91301 COVID-19, MODERNA VACCINE 100MCG/0.5ML DOSE: CPT | Performed by: NURSE PRACTITIONER

## 2021-03-25 PROCEDURE — 0011A COVID-19, MODERNA VACCINE 100MCG/0.5ML DOSE: CPT | Performed by: NURSE PRACTITIONER

## 2021-04-16 ENCOUNTER — APPOINTMENT (OUTPATIENT)
Dept: GENERAL RADIOLOGY | Age: 61
End: 2021-04-16
Payer: COMMERCIAL

## 2021-04-16 ENCOUNTER — HOSPITAL ENCOUNTER (EMERGENCY)
Age: 61
Discharge: HOME | End: 2021-04-16
Attending: EMERGENCY MEDICINE
Payer: COMMERCIAL

## 2021-04-16 ENCOUNTER — APPOINTMENT (OUTPATIENT)
Dept: CT IMAGING | Age: 61
End: 2021-04-16
Payer: COMMERCIAL

## 2021-04-16 VITALS
BODY MASS INDEX: 2.06 KG/M2 | RESPIRATION RATE: 16 BRPM | TEMPERATURE: 98.3 F | OXYGEN SATURATION: 98 % | WEIGHT: 12 LBS | HEART RATE: 96 BPM | DIASTOLIC BLOOD PRESSURE: 87 MMHG | SYSTOLIC BLOOD PRESSURE: 136 MMHG

## 2021-04-16 DIAGNOSIS — R42 VERTIGO: Primary | ICD-10-CM

## 2021-04-16 LAB
ALBUMIN SERPL-MCNC: 4.6 G/DL (ref 3.5–5.2)
ALP BLD-CCNC: 115 U/L (ref 35–104)
ALT SERPL-CCNC: 58 U/L (ref 0–32)
ANION GAP SERPL CALCULATED.3IONS-SCNC: 12 MMOL/L (ref 7–16)
AST SERPL-CCNC: 46 U/L (ref 0–31)
BASOPHILS ABSOLUTE: 0.03 E9/L (ref 0–0.2)
BASOPHILS RELATIVE PERCENT: 0.8 % (ref 0–2)
BILIRUB SERPL-MCNC: <0.2 MG/DL (ref 0–1.2)
BUN BLDV-MCNC: 21 MG/DL (ref 6–20)
CALCIUM SERPL-MCNC: 9.9 MG/DL (ref 8.6–10.2)
CHLORIDE BLD-SCNC: 107 MMOL/L (ref 98–107)
CO2: 25 MMOL/L (ref 22–29)
CREAT SERPL-MCNC: 0.9 MG/DL (ref 0.5–1)
EKG ATRIAL RATE: 81 BPM
EKG P AXIS: 75 DEGREES
EKG P-R INTERVAL: 124 MS
EKG Q-T INTERVAL: 358 MS
EKG QRS DURATION: 74 MS
EKG QTC CALCULATION (BAZETT): 415 MS
EKG R AXIS: 69 DEGREES
EKG T AXIS: 76 DEGREES
EKG VENTRICULAR RATE: 81 BPM
EOSINOPHILS ABSOLUTE: 0.1 E9/L (ref 0.05–0.5)
EOSINOPHILS RELATIVE PERCENT: 2.6 % (ref 0–6)
GFR AFRICAN AMERICAN: >60
GFR NON-AFRICAN AMERICAN: >60 ML/MIN/1.73
GLUCOSE BLD-MCNC: 104 MG/DL (ref 74–99)
HCT VFR BLD CALC: 39.6 % (ref 34–48)
HEMOGLOBIN: 12.6 G/DL (ref 11.5–15.5)
IMMATURE GRANULOCYTES #: 0.02 E9/L
IMMATURE GRANULOCYTES %: 0.5 % (ref 0–5)
LYMPHOCYTES ABSOLUTE: 1.42 E9/L (ref 1.5–4)
LYMPHOCYTES RELATIVE PERCENT: 36.3 % (ref 20–42)
MCH RBC QN AUTO: 31.7 PG (ref 26–35)
MCHC RBC AUTO-ENTMCNC: 31.8 % (ref 32–34.5)
MCV RBC AUTO: 99.5 FL (ref 80–99.9)
MONOCYTES ABSOLUTE: 0.35 E9/L (ref 0.1–0.95)
MONOCYTES RELATIVE PERCENT: 9 % (ref 2–12)
NEUTROPHILS ABSOLUTE: 1.99 E9/L (ref 1.8–7.3)
NEUTROPHILS RELATIVE PERCENT: 50.8 % (ref 43–80)
PDW BLD-RTO: 12.8 FL (ref 11.5–15)
PLATELET # BLD: 245 E9/L (ref 130–450)
PMV BLD AUTO: 9.5 FL (ref 7–12)
POTASSIUM REFLEX MAGNESIUM: 4.3 MMOL/L (ref 3.5–5)
RBC # BLD: 3.98 E12/L (ref 3.5–5.5)
SODIUM BLD-SCNC: 144 MMOL/L (ref 132–146)
TOTAL PROTEIN: 7 G/DL (ref 6.4–8.3)
TROPONIN: <0.01 NG/ML (ref 0–0.03)
WBC # BLD: 3.9 E9/L (ref 4.5–11.5)

## 2021-04-16 PROCEDURE — 96375 TX/PRO/DX INJ NEW DRUG ADDON: CPT

## 2021-04-16 PROCEDURE — 99284 EMERGENCY DEPT VISIT MOD MDM: CPT

## 2021-04-16 PROCEDURE — 84484 ASSAY OF TROPONIN QUANT: CPT

## 2021-04-16 PROCEDURE — 70450 CT HEAD/BRAIN W/O DYE: CPT

## 2021-04-16 PROCEDURE — 93005 ELECTROCARDIOGRAM TRACING: CPT | Performed by: STUDENT IN AN ORGANIZED HEALTH CARE EDUCATION/TRAINING PROGRAM

## 2021-04-16 PROCEDURE — 6370000000 HC RX 637 (ALT 250 FOR IP): Performed by: STUDENT IN AN ORGANIZED HEALTH CARE EDUCATION/TRAINING PROGRAM

## 2021-04-16 PROCEDURE — 6360000002 HC RX W HCPCS: Performed by: STUDENT IN AN ORGANIZED HEALTH CARE EDUCATION/TRAINING PROGRAM

## 2021-04-16 PROCEDURE — 80053 COMPREHEN METABOLIC PANEL: CPT

## 2021-04-16 PROCEDURE — 2580000003 HC RX 258: Performed by: STUDENT IN AN ORGANIZED HEALTH CARE EDUCATION/TRAINING PROGRAM

## 2021-04-16 PROCEDURE — 71045 X-RAY EXAM CHEST 1 VIEW: CPT

## 2021-04-16 PROCEDURE — 96374 THER/PROPH/DIAG INJ IV PUSH: CPT

## 2021-04-16 PROCEDURE — 85025 COMPLETE CBC W/AUTO DIFF WBC: CPT

## 2021-04-16 RX ORDER — PROMETHAZINE HYDROCHLORIDE 25 MG/1
25 TABLET ORAL EVERY 6 HOURS PRN
Qty: 21 TABLET | Refills: 0 | Status: SHIPPED | OUTPATIENT
Start: 2021-04-16 | End: 2021-04-23

## 2021-04-16 RX ORDER — DIAZEPAM 5 MG/1
5 TABLET ORAL EVERY 6 HOURS PRN
Qty: 10 TABLET | Refills: 0 | Status: SHIPPED | OUTPATIENT
Start: 2021-04-16 | End: 2021-04-19

## 2021-04-16 RX ORDER — LORAZEPAM 2 MG/ML
1 INJECTION INTRAMUSCULAR ONCE
Status: COMPLETED | OUTPATIENT
Start: 2021-04-16 | End: 2021-04-16

## 2021-04-16 RX ORDER — MECLIZINE HCL 12.5 MG/1
12.5 TABLET ORAL ONCE
Status: COMPLETED | OUTPATIENT
Start: 2021-04-16 | End: 2021-04-16

## 2021-04-16 RX ORDER — DIAZEPAM 5 MG/1
5 TABLET ORAL ONCE
Status: COMPLETED | OUTPATIENT
Start: 2021-04-16 | End: 2021-04-16

## 2021-04-16 RX ORDER — ONDANSETRON 2 MG/ML
4 INJECTION INTRAMUSCULAR; INTRAVENOUS ONCE
Status: COMPLETED | OUTPATIENT
Start: 2021-04-16 | End: 2021-04-16

## 2021-04-16 RX ORDER — 0.9 % SODIUM CHLORIDE 0.9 %
1000 INTRAVENOUS SOLUTION INTRAVENOUS ONCE
Status: COMPLETED | OUTPATIENT
Start: 2021-04-16 | End: 2021-04-16

## 2021-04-16 RX ADMIN — LORAZEPAM 1 MG: 2 INJECTION INTRAMUSCULAR; INTRAVENOUS at 12:26

## 2021-04-16 RX ADMIN — SODIUM CHLORIDE 1000 ML: 9 INJECTION, SOLUTION INTRAVENOUS at 11:05

## 2021-04-16 RX ADMIN — ONDANSETRON 4 MG: 2 INJECTION INTRAMUSCULAR; INTRAVENOUS at 11:04

## 2021-04-16 RX ADMIN — DIAZEPAM 5 MG: 5 TABLET ORAL at 13:33

## 2021-04-16 RX ADMIN — MECLIZINE 12.5 MG: 12.5 TABLET ORAL at 11:04

## 2021-04-16 ASSESSMENT — ENCOUNTER SYMPTOMS
BACK PAIN: 0
SHORTNESS OF BREATH: 0
DIARRHEA: 0
COUGH: 0
ABDOMINAL DISTENTION: 0
WHEEZING: 0
NAUSEA: 0
VOMITING: 0
PHOTOPHOBIA: 0

## 2021-04-16 NOTE — ED PROVIDER NOTES
appearance. HENT:      Head: Normocephalic and atraumatic. Nose: No congestion or rhinorrhea. Mouth/Throat:      Mouth: Mucous membranes are moist.      Pharynx: Oropharynx is clear. Eyes:      Extraocular Movements: Extraocular movements intact. Pupils: Pupils are equal, round, and reactive to light. Neck:      Musculoskeletal: Normal range of motion. No neck rigidity or muscular tenderness. Cardiovascular:      Rate and Rhythm: Normal rate and regular rhythm. Heart sounds: No murmur. No gallop. Pulmonary:      Effort: Pulmonary effort is normal. No respiratory distress. Breath sounds: No wheezing, rhonchi or rales. Abdominal:      General: Abdomen is flat. Palpations: Abdomen is soft. There is no mass. Tenderness: There is no abdominal tenderness. There is no guarding. Hernia: No hernia is present. Musculoskeletal: Normal range of motion. General: No swelling, tenderness or signs of injury. Skin:     General: Skin is warm and dry. Capillary Refill: Capillary refill takes less than 2 seconds. Neurological:      General: No focal deficit present. Mental Status: She is alert and oriented to person, place, and time. Mental status is at baseline. Cranial Nerves: No cranial nerve deficit. Sensory: No sensory deficit. Motor: No weakness. Comments: On neurological exam, cranial nerves intact, strength 5 to bilateral upper lower extremities, sensation intact to light touch, extraocular eye movements intact, pupils equal round reactive to light. Psychiatric:         Mood and Affect: Mood normal.          Procedures   EKG #1:  Interpreted by emergency department physician unless otherwise noted. Time:  1045    Rate: 81  Rhythm: Sinus. Interpretation: EKG obtained demonstrated normal sinus rhythm, rate 81, normal axis, , no acute ST segment changes. .  Comparison: stable as compared to patient's most recent EKG.      MDM  Number of Diagnoses or Management Options  Vertigo  Diagnosis management comments: Patient is a 79-year-old female past medical history of Raynaud's, hyperlipidemia and complex regional pain syndrome. Patient with chief complaint of dizziness. Vital signs stable presentation. Physical exam cranial nerves intact, no focal logical deficits, motor strength 5 5 to bilateral upper and lower extremities, sensation intact to light touch heart regular rate and rhythm, lungs clear to auscultation bilaterally, abdomen soft nontender. EKG obtained demonstrate no acute ischemic changes. Laboratory work obtained CBC unremarkable, CMP unremarkable, troponin less than 0.01. CT scan of the head without contrast was obtained which demonstrated no acute abnormalities. Patient is given 1 L normal saline as well as meclizine and Zofran with minimal improvement. Patient then given 1 mg Ativan and 5 mg diazepam p.o. with improvement in symptoms. Findings consistent with vertigo. Patient was able to ambulate in the department that assistance. Decision made to discharge patient. Patient was given prescription for Valium and instructed to take as needed for dizziness. In addition patient instructed to follow-up with primary care doctor. Patient notes any new worrisome symptoms she should return to the emergency department for evaluation. Plan of care discussed with patient including discharge, all questions were answered, patient was in agreement plan of care and discharged home in stable condition.        Amount and/or Complexity of Data Reviewed  Clinical lab tests: ordered and reviewed  Tests in the radiology section of CPT®: ordered and reviewed  Decide to obtain previous medical records or to obtain history from someone other than the patient: yes    Risk of Complications, Morbidity, and/or Mortality  Presenting problems: moderate  Diagnostic procedures: moderate  Management options: moderate    Patient or performed during the hospital encounter of 04/16/21   CBC Auto Differential   Result Value Ref Range    WBC 3.9 (L) 4.5 - 11.5 E9/L    RBC 3.98 3.50 - 5.50 E12/L    Hemoglobin 12.6 11.5 - 15.5 g/dL    Hematocrit 39.6 34.0 - 48.0 %    MCV 99.5 80.0 - 99.9 fL    MCH 31.7 26.0 - 35.0 pg    MCHC 31.8 (L) 32.0 - 34.5 %    RDW 12.8 11.5 - 15.0 fL    Platelets 289 396 - 433 E9/L    MPV 9.5 7.0 - 12.0 fL    Neutrophils % 50.8 43.0 - 80.0 %    Immature Granulocytes % 0.5 0.0 - 5.0 %    Lymphocytes % 36.3 20.0 - 42.0 %    Monocytes % 9.0 2.0 - 12.0 %    Eosinophils % 2.6 0.0 - 6.0 %    Basophils % 0.8 0.0 - 2.0 %    Neutrophils Absolute 1.99 1.80 - 7.30 E9/L    Immature Granulocytes # 0.02 E9/L    Lymphocytes Absolute 1.42 (L) 1.50 - 4.00 E9/L    Monocytes Absolute 0.35 0.10 - 0.95 E9/L    Eosinophils Absolute 0.10 0.05 - 0.50 E9/L    Basophils Absolute 0.03 0.00 - 0.20 E9/L   Comprehensive Metabolic Panel w/ Reflex to MG   Result Value Ref Range    Sodium 144 132 - 146 mmol/L    Potassium reflex Magnesium 4.3 3.5 - 5.0 mmol/L    Chloride 107 98 - 107 mmol/L    CO2 25 22 - 29 mmol/L    Anion Gap 12 7 - 16 mmol/L    Glucose 104 (H) 74 - 99 mg/dL    BUN 21 (H) 6 - 20 mg/dL    CREATININE 0.9 0.5 - 1.0 mg/dL    GFR Non-African American >60 >=60 mL/min/1.73    GFR African American >60     Calcium 9.9 8.6 - 10.2 mg/dL    Total Protein 7.0 6.4 - 8.3 g/dL    Albumin 4.6 3.5 - 5.2 g/dL    Total Bilirubin <0.2 0.0 - 1.2 mg/dL    Alkaline Phosphatase 115 (H) 35 - 104 U/L    ALT 58 (H) 0 - 32 U/L    AST 46 (H) 0 - 31 U/L   Troponin   Result Value Ref Range    Troponin <0.01 0.00 - 0.03 ng/mL   EKG 12 Lead   Result Value Ref Range    Ventricular Rate 81 BPM    Atrial Rate 81 BPM    P-R Interval 124 ms    QRS Duration 74 ms    Q-T Interval 358 ms    QTc Calculation (Bazett) 415 ms    P Axis 75 degrees    R Axis 69 degrees    T Axis 76 degrees       Radiology:  XR CHEST 1 VIEW   Final Result   No pneumonia or pleural effusion.          CT Head WO Contrast   Final Result   No acute intracranial abnormality.             ------------------------- NURSING NOTES AND VITALS REVIEWED ---------------------------  Date / Time Roomed:  4/16/2021 10:07 AM  ED Bed Assignment:  17/17    The nursing notes within the ED encounter and vital signs as below have been reviewed. /87   Pulse 96   Temp 98.3 °F (36.8 °C) (Oral)   Resp 16   Wt 12 lb (5.443 kg)   SpO2 98%   BMI 2.06 kg/m²   Oxygen Saturation Interpretation: Normal      ------------------------------------------ PROGRESS NOTES ------------------------------------------  4:40 PM EDT  I have spoken with the patient and discussed todays results, in addition to providing specific details for the plan of care and counseling regarding the diagnosis and prognosis. Their questions are answered at this time and they are agreeable with the plan. I discussed at length with them reasons for immediate return here for re evaluation. They will followup with their primary care physician by calling their office on Monday.      --------------------------------- ADDITIONAL PROVIDER NOTES ---------------------------------  At this time the patient is without objective evidence of an acute process requiring hospitalization or inpatient management. They have remained hemodynamically stable throughout their entire ED visit and are stable for discharge with outpatient follow-up. The plan has been discussed in detail and they are aware of the specific conditions for emergent return, as well as the importance of follow-up. Discharge Medication List as of 4/16/2021  2:59 PM      START taking these medications    Details   diazePAM (VALIUM) 5 MG tablet Take 1 tablet by mouth every 6 hours as needed (dizziness) for up to 3 days. , Disp-10 tablet, R-0Print             Diagnosis:  1. Vertigo        Disposition:  Patient's disposition: Discharge to home  Patient's condition is stable.       Patient was seen and evaluated by myself and my attending No att. providers found. Assessment and Plan discussed with attending provider, please see attestation for final plan of care.      DO Gurinder Dorado DO  Resident  04/16/21 [id number]

## 2021-04-22 ENCOUNTER — IMMUNIZATION (OUTPATIENT)
Dept: PRIMARY CARE CLINIC | Age: 61
End: 2021-04-22
Payer: COMMERCIAL

## 2021-04-22 PROCEDURE — 0012A COVID-19, MODERNA VACCINE 100MCG/0.5ML DOSE: CPT | Performed by: NURSE PRACTITIONER

## 2021-04-22 PROCEDURE — 91301 COVID-19, MODERNA VACCINE 100MCG/0.5ML DOSE: CPT | Performed by: NURSE PRACTITIONER

## 2021-05-21 ENCOUNTER — TELEPHONE (OUTPATIENT)
Dept: FAMILY MEDICINE CLINIC | Age: 61
End: 2021-05-21

## 2021-05-21 RX ORDER — MECLIZINE HYDROCHLORIDE 25 MG/1
25 TABLET ORAL 3 TIMES DAILY PRN
Qty: 30 TABLET | Refills: 0 | Status: SHIPPED | OUTPATIENT
Start: 2021-05-21 | End: 2021-05-31

## 2021-05-21 NOTE — TELEPHONE ENCOUNTER
Getting vertigo, getting it a lot lately, ears just ringing and would like to have something for the dizziness as has been having a lot of it lately. Had a sinus test done at Dr Kika Goodrich office and come back negative.

## 2021-05-24 NOTE — TELEPHONE ENCOUNTER
Pt called stating Meclizine 25mg is not helping her vertigo. She is unable to work because of the symptoms. Sched appt for 5/24 @ 1:45PM. Pt would like to speak to medical staff prior to appt.    Thank you

## 2021-05-24 NOTE — TELEPHONE ENCOUNTER
I called patient and informed that Dr. Casandra Patricia and Janeann Leyden, LPN will not be in the ofc til 5/26/21. Advised patient if her symptoms increase or worsen, go to [address] [place] [address], Urgent Care or ED.      Last seen 3/19/2021  Next appt 5/26/2021

## 2021-05-25 DIAGNOSIS — E03.9 ACQUIRED HYPOTHYROIDISM: ICD-10-CM

## 2021-05-25 DIAGNOSIS — E78.2 MIXED HYPERLIPIDEMIA: ICD-10-CM

## 2021-05-25 LAB
ALBUMIN SERPL-MCNC: 4.4 G/DL (ref 3.5–5.2)
ALP BLD-CCNC: 118 U/L (ref 35–104)
ALT SERPL-CCNC: 44 U/L (ref 0–32)
ANION GAP SERPL CALCULATED.3IONS-SCNC: 13 MMOL/L (ref 7–16)
AST SERPL-CCNC: 41 U/L (ref 0–31)
BILIRUB SERPL-MCNC: 0.3 MG/DL (ref 0–1.2)
BUN BLDV-MCNC: 16 MG/DL (ref 6–20)
CALCIUM SERPL-MCNC: 9.8 MG/DL (ref 8.6–10.2)
CHLORIDE BLD-SCNC: 105 MMOL/L (ref 98–107)
CHOLESTEROL, TOTAL: 264 MG/DL (ref 0–199)
CO2: 21 MMOL/L (ref 22–29)
CREAT SERPL-MCNC: 0.8 MG/DL (ref 0.5–1)
GFR AFRICAN AMERICAN: >60
GFR NON-AFRICAN AMERICAN: >60 ML/MIN/1.73
GLUCOSE BLD-MCNC: 98 MG/DL (ref 74–99)
HCT VFR BLD CALC: 43.5 % (ref 34–48)
HDLC SERPL-MCNC: 44 MG/DL
HEMOGLOBIN: 13.7 G/DL (ref 11.5–15.5)
LDL CHOLESTEROL CALCULATED: 154 MG/DL (ref 0–99)
MCH RBC QN AUTO: 31.3 PG (ref 26–35)
MCHC RBC AUTO-ENTMCNC: 31.5 % (ref 32–34.5)
MCV RBC AUTO: 99.3 FL (ref 80–99.9)
PDW BLD-RTO: 12.6 FL (ref 11.5–15)
PLATELET # BLD: 253 E9/L (ref 130–450)
PMV BLD AUTO: 10.1 FL (ref 7–12)
POTASSIUM SERPL-SCNC: 4.9 MMOL/L (ref 3.5–5)
RBC # BLD: 4.38 E12/L (ref 3.5–5.5)
SODIUM BLD-SCNC: 139 MMOL/L (ref 132–146)
TOTAL PROTEIN: 7.5 G/DL (ref 6.4–8.3)
TRIGL SERPL-MCNC: 328 MG/DL (ref 0–149)
TSH SERPL DL<=0.05 MIU/L-ACNC: 2.28 UIU/ML (ref 0.27–4.2)
VLDLC SERPL CALC-MCNC: 66 MG/DL
WBC # BLD: 3.4 E9/L (ref 4.5–11.5)

## 2021-05-26 ENCOUNTER — OFFICE VISIT (OUTPATIENT)
Dept: FAMILY MEDICINE CLINIC | Age: 61
End: 2021-05-26
Payer: COMMERCIAL

## 2021-05-26 VITALS
OXYGEN SATURATION: 97 % | HEART RATE: 110 BPM | DIASTOLIC BLOOD PRESSURE: 72 MMHG | RESPIRATION RATE: 16 BRPM | TEMPERATURE: 97 F | WEIGHT: 128.5 LBS | BODY MASS INDEX: 21.94 KG/M2 | SYSTOLIC BLOOD PRESSURE: 126 MMHG | HEIGHT: 64 IN

## 2021-05-26 DIAGNOSIS — J45.909 ASTHMA, UNSPECIFIED ASTHMA SEVERITY, UNSPECIFIED WHETHER COMPLICATED, UNSPECIFIED WHETHER PERSISTENT: ICD-10-CM

## 2021-05-26 DIAGNOSIS — K21.9 GASTROESOPHAGEAL REFLUX DISEASE WITHOUT ESOPHAGITIS: ICD-10-CM

## 2021-05-26 DIAGNOSIS — Z12.31 ENCOUNTER FOR SCREENING MAMMOGRAM FOR MALIGNANT NEOPLASM OF BREAST: ICD-10-CM

## 2021-05-26 DIAGNOSIS — E78.2 MIXED HYPERLIPIDEMIA: ICD-10-CM

## 2021-05-26 DIAGNOSIS — R42 DIZZINESS: Primary | ICD-10-CM

## 2021-05-26 DIAGNOSIS — J44.1 COPD EXACERBATION (HCC): ICD-10-CM

## 2021-05-26 PROCEDURE — 99214 OFFICE O/P EST MOD 30 MIN: CPT | Performed by: FAMILY MEDICINE

## 2021-05-26 RX ORDER — ALBUTEROL SULFATE 2.5 MG/3ML
SOLUTION RESPIRATORY (INHALATION)
Qty: 120 EACH | Refills: 0 | Status: SHIPPED | OUTPATIENT
Start: 2021-05-26

## 2021-05-26 RX ORDER — PANTOPRAZOLE SODIUM 40 MG/10ML
40 INJECTION, POWDER, LYOPHILIZED, FOR SOLUTION INTRAVENOUS DAILY
Qty: 90 EACH | Refills: 0 | Status: SHIPPED
Start: 2021-05-26 | End: 2022-01-11

## 2021-05-26 RX ORDER — ATORVASTATIN CALCIUM 40 MG/1
20 TABLET, FILM COATED ORAL DAILY
Qty: 90 TABLET | Refills: 1 | Status: SHIPPED
Start: 2021-05-26 | End: 2022-01-11 | Stop reason: SDUPTHER

## 2021-05-26 SDOH — ECONOMIC STABILITY: FOOD INSECURITY: WITHIN THE PAST 12 MONTHS, YOU WORRIED THAT YOUR FOOD WOULD RUN OUT BEFORE YOU GOT MONEY TO BUY MORE.: NEVER TRUE

## 2021-05-26 NOTE — LETTER
[facility]  [address] [address]  Phone: [phone]  Fax: [phone], DO        May 26, 2021     Patient: Lars Fowler   YOB: 1960   Date of Visit: 5/26/2021       To Whom It May Concern: It is my medical opinion that Veronica Harkins should remain out of work until 6/7/2021. If you have any questions or concerns, please don't hesitate to call.     Sincerely,        Jeevan Joseph, DO

## 2021-07-23 ENCOUNTER — APPOINTMENT (OUTPATIENT)
Dept: CT IMAGING | Age: 61
End: 2021-07-23
Payer: COMMERCIAL

## 2021-07-23 ENCOUNTER — HOSPITAL ENCOUNTER (EMERGENCY)
Age: 61
Discharge: HOME | End: 2021-07-23
Attending: EMERGENCY MEDICINE
Payer: COMMERCIAL

## 2021-07-23 VITALS
DIASTOLIC BLOOD PRESSURE: 73 MMHG | HEART RATE: 78 BPM | HEIGHT: 64 IN | BODY MASS INDEX: 21.85 KG/M2 | TEMPERATURE: 97.5 F | SYSTOLIC BLOOD PRESSURE: 118 MMHG | WEIGHT: 128 LBS | OXYGEN SATURATION: 99 % | RESPIRATION RATE: 18 BRPM

## 2021-07-23 DIAGNOSIS — R42 DIZZINESS: Primary | ICD-10-CM

## 2021-07-23 LAB
ALBUMIN SERPL-MCNC: 4.7 G/DL (ref 3.5–5.2)
ALP BLD-CCNC: 127 U/L (ref 35–104)
ALT SERPL-CCNC: 53 U/L (ref 0–32)
ANION GAP SERPL CALCULATED.3IONS-SCNC: 13 MMOL/L (ref 7–16)
AST SERPL-CCNC: 39 U/L (ref 0–31)
BASOPHILS ABSOLUTE: 0.05 E9/L (ref 0–0.2)
BASOPHILS RELATIVE PERCENT: 1 % (ref 0–2)
BILIRUB SERPL-MCNC: 0.4 MG/DL (ref 0–1.2)
BUN BLDV-MCNC: 23 MG/DL (ref 6–20)
CALCIUM SERPL-MCNC: 10.3 MG/DL (ref 8.6–10.2)
CHLORIDE BLD-SCNC: 108 MMOL/L (ref 98–107)
CO2: 23 MMOL/L (ref 22–29)
CREAT SERPL-MCNC: 0.8 MG/DL (ref 0.5–1)
EOSINOPHILS ABSOLUTE: 0.15 E9/L (ref 0.05–0.5)
EOSINOPHILS RELATIVE PERCENT: 3.1 % (ref 0–6)
GFR AFRICAN AMERICAN: >60
GFR NON-AFRICAN AMERICAN: >60 ML/MIN/1.73
GLUCOSE BLD-MCNC: 98 MG/DL (ref 74–99)
HCT VFR BLD CALC: 38.8 % (ref 34–48)
HEMOGLOBIN: 13 G/DL (ref 11.5–15.5)
IMMATURE GRANULOCYTES #: 0.02 E9/L
IMMATURE GRANULOCYTES %: 0.4 % (ref 0–5)
LYMPHOCYTES ABSOLUTE: 1.49 E9/L (ref 1.5–4)
LYMPHOCYTES RELATIVE PERCENT: 31 % (ref 20–42)
MCH RBC QN AUTO: 31.6 PG (ref 26–35)
MCHC RBC AUTO-ENTMCNC: 33.5 % (ref 32–34.5)
MCV RBC AUTO: 94.2 FL (ref 80–99.9)
MONOCYTES ABSOLUTE: 0.36 E9/L (ref 0.1–0.95)
MONOCYTES RELATIVE PERCENT: 7.5 % (ref 2–12)
NEUTROPHILS ABSOLUTE: 2.74 E9/L (ref 1.8–7.3)
NEUTROPHILS RELATIVE PERCENT: 57 % (ref 43–80)
PDW BLD-RTO: 12.3 FL (ref 11.5–15)
PLATELET # BLD: 241 E9/L (ref 130–450)
PMV BLD AUTO: 9.5 FL (ref 7–12)
POTASSIUM SERPL-SCNC: 4.7 MMOL/L (ref 3.5–5)
RBC # BLD: 4.12 E12/L (ref 3.5–5.5)
SODIUM BLD-SCNC: 144 MMOL/L (ref 132–146)
TOTAL PROTEIN: 7.4 G/DL (ref 6.4–8.3)
TROPONIN, HIGH SENSITIVITY: <6 NG/L (ref 0–9)
TSH SERPL DL<=0.05 MIU/L-ACNC: 6.26 UIU/ML (ref 0.27–4.2)
WBC # BLD: 4.8 E9/L (ref 4.5–11.5)

## 2021-07-23 PROCEDURE — 99284 EMERGENCY DEPT VISIT MOD MDM: CPT

## 2021-07-23 PROCEDURE — 80053 COMPREHEN METABOLIC PANEL: CPT

## 2021-07-23 PROCEDURE — 84443 ASSAY THYROID STIM HORMONE: CPT

## 2021-07-23 PROCEDURE — 84484 ASSAY OF TROPONIN QUANT: CPT

## 2021-07-23 PROCEDURE — 6370000000 HC RX 637 (ALT 250 FOR IP): Performed by: EMERGENCY MEDICINE

## 2021-07-23 PROCEDURE — 85025 COMPLETE CBC W/AUTO DIFF WBC: CPT

## 2021-07-23 PROCEDURE — 6370000000 HC RX 637 (ALT 250 FOR IP): Performed by: STUDENT IN AN ORGANIZED HEALTH CARE EDUCATION/TRAINING PROGRAM

## 2021-07-23 PROCEDURE — 93005 ELECTROCARDIOGRAM TRACING: CPT | Performed by: STUDENT IN AN ORGANIZED HEALTH CARE EDUCATION/TRAINING PROGRAM

## 2021-07-23 PROCEDURE — 70450 CT HEAD/BRAIN W/O DYE: CPT

## 2021-07-23 RX ORDER — MECLIZINE HCL 12.5 MG/1
25 TABLET ORAL ONCE
Status: COMPLETED | OUTPATIENT
Start: 2021-07-23 | End: 2021-07-23

## 2021-07-23 RX ORDER — ONDANSETRON 4 MG/1
4 TABLET, ORALLY DISINTEGRATING ORAL ONCE
Status: COMPLETED | OUTPATIENT
Start: 2021-07-23 | End: 2021-07-23

## 2021-07-23 RX ORDER — DIAZEPAM 5 MG/1
5 TABLET ORAL EVERY 6 HOURS PRN
Qty: 4 TABLET | Refills: 0 | Status: SHIPPED | OUTPATIENT
Start: 2021-07-23 | End: 2021-07-27

## 2021-07-23 RX ORDER — DIAZEPAM 5 MG/1
5 TABLET ORAL ONCE
Status: DISCONTINUED | OUTPATIENT
Start: 2021-07-23 | End: 2021-07-23

## 2021-07-23 RX ORDER — DIAZEPAM 5 MG/1
5 TABLET ORAL ONCE
Status: COMPLETED | OUTPATIENT
Start: 2021-07-23 | End: 2021-07-23

## 2021-07-23 RX ADMIN — MECLIZINE 25 MG: 12.5 TABLET ORAL at 12:57

## 2021-07-23 RX ADMIN — ONDANSETRON 4 MG: 4 TABLET, ORALLY DISINTEGRATING ORAL at 11:46

## 2021-07-23 RX ADMIN — DIAZEPAM 5 MG: 5 TABLET ORAL at 14:19

## 2021-07-23 ASSESSMENT — ENCOUNTER SYMPTOMS
NAUSEA: 1
TROUBLE SWALLOWING: 0
SORE THROAT: 0
ABDOMINAL PAIN: 0
WHEEZING: 0
SHORTNESS OF BREATH: 0
VOMITING: 0
RHINORRHEA: 0
BACK PAIN: 0
COUGH: 0
EYE PAIN: 0
SINUS PAIN: 0
DIARRHEA: 0

## 2021-07-23 NOTE — ED PROVIDER NOTES
swelling. Cervical back: Normal range of motion. No rigidity or tenderness. Right lower leg: No edema. Left lower leg: No edema. Lymphadenopathy:      Cervical: No cervical adenopathy. Skin:     General: Skin is warm and dry. Coloration: Skin is not jaundiced. Neurological:      General: No focal deficit present. Mental Status: She is alert and oriented to person, place, and time. Cranial Nerves: No cranial nerve deficit. Sensory: No sensory deficit. Motor: No weakness. Psychiatric:         Mood and Affect: Mood normal.         Behavior: Behavior normal.         Thought Content: Thought content normal.         Judgment: Judgment normal.          Procedures     EKG: This EKG is signed and interpreted by me. Rate: 90   Rhythm: Sinus  Interpretation: no acute changes  Comparison: no previous EKG available    MDM  Number of Diagnoses or Management Options  Dizziness  Diagnosis management comments: This is a 82-year-old female who presents today with complaints of dizziness and nausea onset yesterday associated with seeing floaters. She denies any visual changes. She did have an episode like this in May and was evaluated for vertigo with normal workup. She was given Zofran for nausea control. CBC and CMP are WNL. Head CT WO contrast showed no acute abnormalities. She was given meclizine for dizziness and Zofran for nausea. She denied improvement of her symptoms on meclizine. Gave the patient 5 mg of Valium. Informed the patient that she needs to see her PCP for further evaluation and management. Directed her to return to the ED if her symptoms worsen or progress. Amount and/or Complexity of Data Reviewed  Decide to obtain previous medical records or to obtain history from someone other than the patient: yes         ED Course as of Jul 23 1255   Fri Jul 23, 2021   1230 Bedside ultrasound of R eye performed and showed no abnormalities.      [NS]      ED Course User Index  [NS] Meir Clifton DO       ED Course as of    1230 Bedside ultrasound of R eye performed and showed no abnormalities. [NS]      ED Course User Index  [NS] Meir Clifton DO       --------------------------------------------- PAST HISTORY ---------------------------------------------  Past Medical History:  has a past medical history of Asthma, Chronic pain, Chronic rhinitis, CRPS (complex regional pain syndrome), lower limb, Hyperlipidemia, Raynauds syndrome, Right foot pain, RSD lower limb, and Tinnitus. Past Surgical History:  has a past surgical history that includes  section (, ); Tubal ligation (); Hemorrhoid surgery (); Bunionectomy (); Esophagus surgery (); Endometrial ablation (); Nerve Block (); Nerve Block (12); Nerve Block (2012); Nerve Block (10-01-12); Nerve Block (10-10-12); Nerve Block (10/24/12); Nerve Block (12); Nerve Block (12); Nerve Block (Left, 14); Nerve Block (14); Nerve Block (Left, 14); Nerve Block (Left, 2014); Nerve Block (Left, 14); Nerve Block (Left, 10 8 14); Nerve Block (N/A, 2016); Nerve Block (2016); Nerve Block (Right, 2016); Nerve Block (Right, 08/10/2016); Nerve Block (Right, 2016); Nerve Block (Right, 2016); other surgical history (2017); Breast surgery (); hernia repair; and Foot surgery (Right, 2020). Social History:  reports that she quit smoking about 4 years ago. Her smoking use included cigarettes. She has a 15.00 pack-year smoking history. She has never used smokeless tobacco. She reports current alcohol use of about 2.0 standard drinks of alcohol per week. She reports that she does not use drugs. Family History: family history includes Cancer in her father; Hypertension in her mother; Kidney Disease in her mother.      The patients home medications have been reviewed. Allergies: Patient has no known allergies.     -------------------------------------------------- RESULTS -------------------------------------------------  Labs:  Results for orders placed or performed during the hospital encounter of 07/23/21   Troponin   Result Value Ref Range    Troponin, High Sensitivity <6 0 - 9 ng/L   CBC auto differential   Result Value Ref Range    WBC 4.8 4.5 - 11.5 E9/L    RBC 4.12 3.50 - 5.50 E12/L    Hemoglobin 13.0 11.5 - 15.5 g/dL    Hematocrit 38.8 34.0 - 48.0 %    MCV 94.2 80.0 - 99.9 fL    MCH 31.6 26.0 - 35.0 pg    MCHC 33.5 32.0 - 34.5 %    RDW 12.3 11.5 - 15.0 fL    Platelets 569 751 - 757 E9/L    MPV 9.5 7.0 - 12.0 fL    Neutrophils % 57.0 43.0 - 80.0 %    Immature Granulocytes % 0.4 0.0 - 5.0 %    Lymphocytes % 31.0 20.0 - 42.0 %    Monocytes % 7.5 2.0 - 12.0 %    Eosinophils % 3.1 0.0 - 6.0 %    Basophils % 1.0 0.0 - 2.0 %    Neutrophils Absolute 2.74 1.80 - 7.30 E9/L    Immature Granulocytes # 0.02 E9/L    Lymphocytes Absolute 1.49 (L) 1.50 - 4.00 E9/L    Monocytes Absolute 0.36 0.10 - 0.95 E9/L    Eosinophils Absolute 0.15 0.05 - 0.50 E9/L    Basophils Absolute 0.05 0.00 - 0.20 E9/L   Comprehensive metabolic panel   Result Value Ref Range    Sodium 144 132 - 146 mmol/L    Potassium 4.7 3.5 - 5.0 mmol/L    Chloride 108 (H) 98 - 107 mmol/L    CO2 23 22 - 29 mmol/L    Anion Gap 13 7 - 16 mmol/L    Glucose 98 74 - 99 mg/dL    BUN 23 (H) 6 - 20 mg/dL    CREATININE 0.8 0.5 - 1.0 mg/dL    GFR Non-African American >60 >=60 mL/min/1.73    GFR African American >60     Calcium 10.3 (H) 8.6 - 10.2 mg/dL    Total Protein 7.4 6.4 - 8.3 g/dL    Albumin 4.7 3.5 - 5.2 g/dL    Total Bilirubin 0.4 0.0 - 1.2 mg/dL    Alkaline Phosphatase 127 (H) 35 - 104 U/L    ALT 53 (H) 0 - 32 U/L    AST 39 (H) 0 - 31 U/L   TSH without Reflex   Result Value Ref Range    TSH 6.260 (H) 0.270 - 4.200 uIU/mL   EKG 12 Lead   Result Value Ref Range    Ventricular Rate 87 BPM    Atrial Rate 87 BPM P-R Interval 120 ms    QRS Duration 72 ms    Q-T Interval 370 ms    QTc Calculation (Bazett) 445 ms    P Axis 71 degrees    R Axis 51 degrees    T Axis 74 degrees       Radiology:  CT HEAD WO CONTRAST   Final Result   1. There is no acute intracranial abnormality. Specifically, there is no   intracranial hemorrhage   2. Mucosal thickening within the left maxillary sinus.             ------------------------- NURSING NOTES AND VITALS REVIEWED ---------------------------  Date / Time Roomed:  7/23/2021 10:36 AM  ED Bed Assignment:  21/21    The nursing notes within the ED encounter and vital signs as below have been reviewed. /73   Pulse 78   Temp 97.5 °F (36.4 °C)   Resp 18   Ht 5' 4\" (1.626 m)   Wt 128 lb (58.1 kg)   SpO2 99%   BMI 21.97 kg/m²   Oxygen Saturation Interpretation: Normal      ------------------------------------------ PROGRESS NOTES ------------------------------------------  7:50 PM EDT  I have spoken with the patient and discussed todays results, in addition to providing specific details for the plan of care and counseling regarding the diagnosis and prognosis. Their questions are answered at this time and they are agreeable with the plan. I discussed at length with them reasons for immediate return here for re evaluation. They will followup with their and the on call primary care physician, general surgeon and orthopedic physician by calling their office tomorrow and on Monday.      --------------------------------- ADDITIONAL PROVIDER NOTES ---------------------------------  At this time the patient is without objective evidence of an acute process requiring hospitalization or inpatient management. They have remained hemodynamically stable throughout their entire ED visit and are stable for discharge with outpatient follow-up. The plan has been discussed in detail and they are aware of the specific conditions for emergent return, as well as the importance of follow-up. Discharge Medication List as of 7/23/2021  2:35 PM      START taking these medications    Details   diazePAM (VALIUM) 5 MG tablet Take 1 tablet by mouth every 6 hours as needed for Anxiety for up to 4 days. , Disp-4 tablet, R-0Print             Diagnosis:  1. Dizziness        Disposition:  Patient's disposition: Discharge to home  Patient's condition is stable. Lincoln Bunn, DO  Resident  07/23/21 300 [facility],   Resident  07/23/21 1950    ATTENDING PROVIDER ATTESTATION:     Sherri Terry presented to the emergency department for evaluation of Dizziness (vision changes x 1 day) and Nausea    I have reviewed and discussed the case, including pertinent history (medical, surgical, family and social) and exam findings with the Resident and the Nurse assigned to Sherri Mara. I have personally performed and/or participated in the history, exam, medical decision making, and procedures and agree with all pertinent clinical information. I have reviewed my findings and recommendations with Sherri Terry and members of family present at the time of disposition. I, Dr. Remington Plaza am the primary physician of record for this patient. MDM: The patient is 63 y.o. female  with a past medical history of       Diagnosis Date    Asthma     controlled with inhalers     Chronic pain     Chronic rhinitis 2011    CRPS (complex regional pain syndrome), lower limb     left foot    Hyperlipidemia     Raynauds syndrome 2019    Right foot pain     for OR 8-26-20     RSD lower limb     left foot    Tinnitus      presenting to the emergency department with a chief complaint of dizziness and visual disturbance. Differential diagnosis includes but not limited to, orthostatic hypotension, electrolyte derangement, intracranial hemorrhage, vertigo. The patient did have labs and imaging which were reviewed. CBC, CMP unremarkable, CT head with no acute process.  Patient did have improvement after Antivert and Valium. The patient will be discharged home follow-up outpatient. My findings/plan: The encounter diagnosis was Dizziness. Discharge Medication List as of 7/23/2021  2:35 PM      START taking these medications    Details   diazePAM (VALIUM) 5 MG tablet Take 1 tablet by mouth every 6 hours as needed for Anxiety for up to 4 days. , Disp-4 tablet, R-0Print           Guilherme Ibarra, [address], DO  07/30/21 3751

## 2021-07-23 NOTE — DISCHARGE INSTR - COC
Continuity of Care Form    Patient Name: Deshawn Hassan   :  1960  MRN:  [id number]    Admit date:  2021  Discharge date:  ***    Code Status Order: Prior   Advance Directives:     Admitting Physician:  No admitting provider for patient encounter.   PCP: Jono Lima DO    Discharging Nurse: [facility] Unit/Room#:   Discharging Unit Phone Number: ***    Emergency Contact:   Extended Emergency Contact Information  Primary Emergency Contact: Patrick Rodriguez  Address: [address] [place] [address]           Anna CHO [address] [place] [address] Phone: [phone]  Mobile Phone: [phone]  Relation: Domestic Partner  Secondary Emergency Contact: Ricky Chavarria   [address] Phone: [phone]  Mobile Phone: [phone]  Relation: Child    Past Surgical History:  Past Surgical History:   Procedure Laterality Date    BREAST SURGERY  2009    lump left breast    BUNIONECTOMY  2011    left     SECTION  ,     ENDOMETRIAL ABLATION      ESOPHAGUS SURGERY      due to gerd    FOOT SURGERY Right 2020    TARSAL TUNNEL RELEASE RIGHT FOOT performed by Dunia Shetty DPM at [place] [address]   [address]  12    paravertebral sympathetic left side #1    NERVE BLOCK  12    paravert sympathetic left    NERVE BLOCK  2012    left paravertebral sympathetic #3    NERVE BLOCK  10-01-12    left paravertebral sympathetic  #4    NERVE BLOCK  10-10-12    paravertebral sympathetic left #5    NERVE BLOCK  10/24/12    left sympathetic    NERVE BLOCK  12    paravert sympathetic block left #7    NERVE BLOCK  12    left paravertebral sympathetic left #8    NERVE BLOCK Left 14    lumbar sympathetic #1    NERVE BLOCK  14    paravertebral sympathetic left #2    NERVE BLOCK Left 14    PARAVERTEBRAL SUMPATHETIV BLOCK #3    NERVE BLOCK Left 2014    left paravertebral sympathetic #5    NERVE BLOCK Left 09/17/14    left paravertebral sympathetic nerve block #6 9/17/14    NERVE BLOCK Left 10 8 14    paravert sympathetic #7    NERVE BLOCK N/A 07/06/2016    sympathetic #1    NERVE BLOCK  07/13/2016    right parasympathic nerve block lumbar #2    NERVE BLOCK Right 07/20/2016    paravertebral sympathetic right #3    NERVE BLOCK Right 08/10/2016    lumbar parasympathetic block #4    NERVE BLOCK Right 08/17/2016    paravertebral sympathetic right #5    NERVE BLOCK Right 08/24/2016    paravertebral sympathetic right #6    OTHER SURGICAL HISTORY  03/27/2017    laparscopic incisional hernia repair with mesh    TUBAL LIGATION  1982       Immunization History:   Immunization History   Administered Date(s) Administered    COVID-19, Moderna, PF, 100mcg/0.5mL 03/25/2021, 04/22/2021    Influenza Vaccine, unspecified formulation 10/13/2016    Influenza Virus Vaccine 10/28/2020    Influenza Whole 11/02/2015    Influenza, MDCK Quadv, IM, PF (Flucelvax 4 yrs and older) 10/12/2017    Influenza, Beth Treadwell, 6-35 Months, IM (Fluzone,Afluria) 09/16/2019    Influenza, Beth [place], IM, PF (6 mo and older Fluzone, Flulaval, Fluarix, and 3 yrs and older Afluria) 11/02/2015, 10/15/2018    PPD Test 01/18/2018    Pneumococcal Polysaccharide ([id number]) 10/22/2018    Tdap (Boostrix, Adacel) 10/06/2015       Active Problems:  Patient Active Problem List   Diagnosis Code    CRPS of the left foot. G90.529    Peripheral neuropathy of the left foot. G62.9    Status post bunionectomy Z98.890    chronic neuropathic pain syndrome.  M79.2    Thyroid nodule E04.1    Unilateral hearing loss H91.90    RSD lower limb G90.529    Chronic pain syndrome G89.4    Anemia D64.9    Underweight R63.6    Dependence on nicotine from cigarettes F17.210    Acute respiratory failure with hypoxia (HCC) J96.01    COPD exacerbation (HCC) J44.1    Asthma J45.909    Elevated LFTs R79.89    Dyslipidemia E78.5    Abnormal laboratory test result R89.9       Isolation/Infection:   Isolation          No Isolation        Patient Infection Status     Infection Onset Added Last Indicated Last Indicated By Review Planned Expiration Resolved Resolved By    None active    Resolved    COVID-19 Rule Out 20 COVID-19 Ambulatory (Ordered)   21 Rule-Out Test Resulted    COVID-19 Rule Out 20 Covid-19 Ambulatory (Ordered)   20 Rule-Out Test Resulted          Nurse Assessment:  Last Vital Signs: /81   Pulse 96   Temp 97.5 °F (36.4 °C)   Resp 18   Ht 5' 4\" (1.626 m)   Wt 128 lb (58.1 kg)   SpO2 98%   BMI 21.97 kg/m²     Last documented pain score (0-10 scale):    Last Weight:   Wt Readings from Last 1 Encounters:   21 128 lb (58.1 kg)     Mental Status:  {IP PT MENTAL STATUS:}    IV Access:  { ZEE IV ACCESS:168870512}    Nursing Mobility/ADLs:  Walking   {CHP DME BVIK:401140480}  Transfer  {CHP DME EUVS:670704713}  Bathing  {CHP DME HTIY:837121450}  Dressing  {CHP DME VZH}  Toileting  {CHP DME XHCQ:178335235}  Feeding  {CHP DME UEWW:998910292}  Med Admin  {P DME XOPB:776232806}  Med Delivery   {[facility] MED Delivery:776262713}    Wound Care Documentation and Therapy:        Elimination:  Continence:   · Bowel: {YES / JW:00868}  · Bladder: {YES / HX:70375}  Urinary Catheter: {Urinary Catheter:694957596}   Colostomy/Ileostomy/Ileal Conduit: {YES / CR:}       Date of Last BM: ***  No intake or output data in the 24 hours ending 21 1435  No intake/output data recorded.     Safety Concerns:     [address] Safety Concerns:543635760}    Impairments/Disabilities:      [address] Impairments/Disabilities:135999917}    Nutrition Therapy:  Current Nutrition Therapy:   [address] Diet List:183237682}    Routes of Feeding: {CHP DME Other Feedings:012745773}  Liquids: {Slp liquid thickness:95972}  Daily Fluid Restriction: {CHP DME Yes amt EKKTRAB:290617717}  Last Modified Barium Swallow with Video (Video Swallowing Test): {[facility]574605882}    Treatments at the Time of Hospital Discharge:   Respiratory Treatments: ***  Oxygen Therapy:  {Therapy; copd oxygen:55610}  Ventilator:    { CC Vent SGUY:845379013}    Rehab Therapies: {THERAPEUTIC INTERVENTION:2368126100}  Weight Bearing Status/Restrictions: {[facility] Weight Bearin}  Other Medical Equipment (for information only, NOT a DME order):  {EQUIPMENT:074698803}  Other Treatments: ***    Patient's personal belongings (please select all that are sent with patient):  {[facility] DME Belongings:083348227}    RN SIGNATURE:  {Esignature:889643532}    CASE MANAGEMENT/SOCIAL WORK SECTION    Inpatient Status Date: ***    Readmission Risk Assessment Score:  Readmission Risk              Risk of Unplanned Readmission:  0           Discharging to Facility/ Agency   · Name:   · Address:  · Phone:  · Fax:    Dialysis Facility (if applicable)   · Name:  · Address:  · Dialysis Schedule:  · Phone:  · Fax:    / signature: {Esignature:426783563}    PHYSICIAN SECTION    Prognosis: {Prognosis:7081382488}    Condition at Discharge: [address] [place] [address] Patient Condition:616286807}    Rehab Potential (if transferring to Rehab): {Prognosis:6785500960}    Recommended Labs or Other Treatments After Discharge: ***    Physician Certification: I certify the above information and transfer of Nikolas Corea  is necessary for the continuing treatment of the diagnosis listed and that she requires {Admit to Appropriate Level of Care:54075} for {GREATER/LESS:643439752} 30 days.      Update Admission H&P: {CHP DME Changes in KPLHT:090898126}    PHYSICIAN SIGNATURE:  {Esignature:675576662}

## 2021-07-24 LAB
EKG ATRIAL RATE: 87 BPM
EKG P AXIS: 71 DEGREES
EKG P-R INTERVAL: 120 MS
EKG Q-T INTERVAL: 370 MS
EKG QRS DURATION: 72 MS
EKG QTC CALCULATION (BAZETT): 445 MS
EKG R AXIS: 51 DEGREES
EKG T AXIS: 74 DEGREES
EKG VENTRICULAR RATE: 87 BPM

## 2021-07-24 PROCEDURE — 93010 ELECTROCARDIOGRAM REPORT: CPT | Performed by: INTERNAL MEDICINE

## 2021-07-28 ENCOUNTER — TELEPHONE (OUTPATIENT)
Dept: CARDIAC REHAB | Age: 61
End: 2021-07-28

## 2021-07-28 NOTE — TELEPHONE ENCOUNTER
Spoke with patient back in April about cardiac rehab referral. Patient stated her work schedule would interfere with time but would reach back out to us. Upon several attempts, we were unable to reach her. Discharging referral at this time. Dr. Ligia Beaver to be notified.

## 2021-08-16 ENCOUNTER — HOSPITAL ENCOUNTER (OUTPATIENT)
Dept: GENERAL RADIOLOGY | Age: 61
Discharge: HOME | End: 2021-08-18
Payer: COMMERCIAL

## 2021-08-16 ENCOUNTER — HOSPITAL ENCOUNTER (OUTPATIENT)
Age: 61
Discharge: HOME | End: 2021-08-18
Payer: COMMERCIAL

## 2021-08-16 DIAGNOSIS — J44.9 OBSTRUCTIVE CHRONIC BRONCHITIS WITHOUT EXACERBATION (HCC): ICD-10-CM

## 2021-08-16 PROCEDURE — 71046 X-RAY EXAM CHEST 2 VIEWS: CPT

## 2021-08-19 DIAGNOSIS — J44.9 CHRONIC OBSTRUCTIVE PULMONARY DISEASE, UNSPECIFIED COPD TYPE (HCC): ICD-10-CM

## 2021-08-20 RX ORDER — LEVALBUTEROL TARTRATE 45 UG/1
AEROSOL, METERED ORAL
Qty: 15 G | Refills: 0 | OUTPATIENT
Start: 2021-08-20

## 2021-09-25 DIAGNOSIS — E03.9 ACQUIRED HYPOTHYROIDISM: ICD-10-CM

## 2021-09-27 RX ORDER — LEVOTHYROXINE SODIUM 0.05 MG/1
TABLET ORAL
Qty: 90 TABLET | Refills: 0 | Status: SHIPPED
Start: 2021-09-27 | End: 2021-12-22

## 2021-10-11 ENCOUNTER — OFFICE VISIT (OUTPATIENT)
Dept: PRIMARY CARE CLINIC | Age: 61
End: 2021-10-11
Payer: COMMERCIAL

## 2021-10-11 VITALS
SYSTOLIC BLOOD PRESSURE: 120 MMHG | TEMPERATURE: 97.8 F | BODY MASS INDEX: 22.2 KG/M2 | RESPIRATION RATE: 16 BRPM | OXYGEN SATURATION: 100 % | HEART RATE: 98 BPM | HEIGHT: 64 IN | WEIGHT: 130 LBS | DIASTOLIC BLOOD PRESSURE: 82 MMHG

## 2021-10-11 DIAGNOSIS — G62.9 NEUROPATHY: Primary | ICD-10-CM

## 2021-10-11 DIAGNOSIS — K59.00 CONSTIPATION, UNSPECIFIED CONSTIPATION TYPE: ICD-10-CM

## 2021-10-11 PROCEDURE — 99213 OFFICE O/P EST LOW 20 MIN: CPT | Performed by: FAMILY MEDICINE

## 2021-10-11 RX ORDER — MECLIZINE HYDROCHLORIDE 25 MG/1
TABLET ORAL
COMMUNITY
Start: 2021-08-30 | End: 2022-02-23

## 2021-10-11 RX ORDER — BUDESONIDE AND FORMOTEROL FUMARATE DIHYDRATE 160; 4.5 UG/1; UG/1
AEROSOL RESPIRATORY (INHALATION)
COMMUNITY
Start: 2021-09-25 | End: 2022-01-11

## 2021-10-11 RX ORDER — DULOXETIN HYDROCHLORIDE 20 MG/1
20 CAPSULE, DELAYED RELEASE ORAL DAILY
Qty: 30 CAPSULE | Refills: 2 | Status: SHIPPED
Start: 2021-10-11 | End: 2022-01-10

## 2021-10-11 NOTE — PROGRESS NOTES
Subjective:  63 y.o. female who presents to the office today with chief complaint:  Chief Complaint   Patient presents with    1 Month Follow-Up     Dizziness continues . Appt tomorrow Dr Amor Fragoso Check-Up     Dizziness: Has appointment with Dr. Daphney Bland tomorrow. Less severe, still occurring occasionally. Tolerating work at this time. Difficulty tolerating loud noises. Educated pt on ear protection. Meclizine PRN. COPD: Albuterol nebulizer, spiriva, symbicort. Follows with Dr. Dasia Medina Using xopenex inhaler 2-3 times daily depending on dust at work, weather. Stopped singulair d/t \"feeling goofy. \" Concerned that oral medications caused dizziness     Neuropathy: Follows with Dr. Clair Headley for podiatry. Requested to be started on cymbalta- pt agreed to stop effexor. Labs: Results of labs were interpreted and discussed with the patient. Cholesterol remains elevated even though she is already on a statin. Discussion held with the patient about increasing intensity of the statin-patient understood and agreed. Health Maintenance Due   Topic Date Due    Shingles Vaccine (1 of 2) Never done    Cervical cancer screen  05/14/2021    Breast cancer screen  06/25/2021         Review of Systems    Review of Systems: All bolded are positive, all others are negative. General:  Fever, chills, diaphoresis, fatigue, malaise, night sweats, weight loss  Psychological:  Anxiety, disorientation, hallucinations. ENT:  Epistaxis, headaches, vertigo, visual changes. Cardiovascular:  Chest pain, irregular heartbeats, palpitations, paroxysmal nocturnal dyspnea. Respiratory:  Shortness of breath, coughing, sputum production, hemoptysis, wheezing, orthopnea.   Gastrointestinal:  Nausea, vomiting, diarrhea, heartburn, constipation, abdominal pain, hematemesis, hematochezia, melena, acholic stools  Genito-Urinary:  Dysuria, urgency, frequency, hematuria  Musculoskeletal:  Joint pain, joint stiffness, joint swelling, muscle pain  Neurology:  Headache, focal neurological deficits, weakness, numbness, paresthesia  Derm:  Rashes, ulcers, excoriations, bruising  Extremities:  Decreased ROM, peripheral edema, mottling      Objective:  Vitals:    10/11/21 1412   BP: 120/82   Pulse: 98   Resp: 16   Temp: 97.8 °F (36.6 °C)   SpO2: 100%     Physical Exam  Constitutional:       General: She is not in acute distress. Appearance: She is well-developed. She is not diaphoretic. HENT:      Head: Normocephalic and atraumatic. Right Ear: External ear normal.      Left Ear: External ear normal.      Nose: Nose normal.      Mouth/Throat:      Pharynx: No oropharyngeal exudate. Eyes:      General:         Right eye: No discharge. Left eye: No discharge. Conjunctiva/sclera: Conjunctivae normal.      Pupils: Pupils are equal, round, and reactive to light. Cardiovascular:      Rate and Rhythm: Normal rate and regular rhythm. Heart sounds: Normal heart sounds. No murmur heard. No friction rub. No gallop. Pulmonary:      Effort: Pulmonary effort is normal. No respiratory distress. Breath sounds: Normal breath sounds. No wheezing or rales. Abdominal:      General: Bowel sounds are normal. There is no distension. Palpations: Abdomen is soft. Tenderness: There is no abdominal tenderness. There is no guarding or rebound. Musculoskeletal:      Cervical back: Normal range of motion and neck supple. Lymphadenopathy:      Cervical: No cervical adenopathy. Neurological:      Mental Status: She is alert and oriented to person, place, and time. Comments: Melyssa-Hallpike maneuver not completed due to being negative in the past and patient having future work-up. Psychiatric:         Behavior: Behavior normal.         Thought Content:  Thought content normal.         Judgment: Judgment normal.         Results for orders placed or performed during the hospital encounter of 07/23/21   Troponin   Result Value Ref Range    Troponin, High Sensitivity <6 0 - 9 ng/L   CBC auto differential   Result Value Ref Range    WBC 4.8 4.5 - 11.5 E9/L    RBC 4.12 3.50 - 5.50 E12/L    Hemoglobin 13.0 11.5 - 15.5 g/dL    Hematocrit 38.8 34.0 - 48.0 %    MCV 94.2 80.0 - 99.9 fL    MCH 31.6 26.0 - 35.0 pg    MCHC 33.5 32.0 - 34.5 %    RDW 12.3 11.5 - 15.0 fL    Platelets 991 308 - 634 E9/L    MPV 9.5 7.0 - 12.0 fL    Neutrophils % 57.0 43.0 - 80.0 %    Immature Granulocytes % 0.4 0.0 - 5.0 %    Lymphocytes % 31.0 20.0 - 42.0 %    Monocytes % 7.5 2.0 - 12.0 %    Eosinophils % 3.1 0.0 - 6.0 %    Basophils % 1.0 0.0 - 2.0 %    Neutrophils Absolute 2.74 1.80 - 7.30 E9/L    Immature Granulocytes # 0.02 E9/L    Lymphocytes Absolute 1.49 (L) 1.50 - 4.00 E9/L    Monocytes Absolute 0.36 0.10 - 0.95 E9/L    Eosinophils Absolute 0.15 0.05 - 0.50 E9/L    Basophils Absolute 0.05 0.00 - 0.20 E9/L   Comprehensive metabolic panel   Result Value Ref Range    Sodium 144 132 - 146 mmol/L    Potassium 4.7 3.5 - 5.0 mmol/L    Chloride 108 (H) 98 - 107 mmol/L    CO2 23 22 - 29 mmol/L    Anion Gap 13 7 - 16 mmol/L    Glucose 98 74 - 99 mg/dL    BUN 23 (H) 6 - 20 mg/dL    CREATININE 0.8 0.5 - 1.0 mg/dL    GFR Non-African American >60 >=60 mL/min/1.73    GFR African American >60     Calcium 10.3 (H) 8.6 - 10.2 mg/dL    Total Protein 7.4 6.4 - 8.3 g/dL    Albumin 4.7 3.5 - 5.2 g/dL    Total Bilirubin 0.4 0.0 - 1.2 mg/dL    Alkaline Phosphatase 127 (H) 35 - 104 U/L    ALT 53 (H) 0 - 32 U/L    AST 39 (H) 0 - 31 U/L   TSH without Reflex   Result Value Ref Range    TSH 6.260 (H) 0.270 - 4.200 uIU/mL   EKG 12 Lead   Result Value Ref Range    Ventricular Rate 87 BPM    Atrial Rate 87 BPM    P-R Interval 120 ms    QRS Duration 72 ms    Q-T Interval 370 ms    QTc Calculation (Bazett) 445 ms    P Axis 71 degrees    R Axis 51 degrees    T Axis 74 degrees         Assessment and Plan:  Carmen Talitacuco was seen today for 1 month follow-up and check-up.     Diagnoses and all orders for this visit:    Neuropathy  -     DULoxetine (CYMBALTA) 20 MG extended release capsule; Take 1 capsule by mouth daily    Constipation, unspecified constipation type  -     naloxegol (MOVANTIK) 25 MG TABS tablet; Take 1 tablet by mouth every morning        Dizziness: Await recommendations from ENT. COPD: Continue to follow with Dr. Dmitry Smith. Breathing at baseline. Neuropathy: Follows with Dr. Yolie Escamilla for podiatry-continue. Labs: Results of labs were interpreted and discussed with the patient.          Declan Robledo DO  10/11/2021  10:26 AM

## 2021-10-12 ENCOUNTER — PROCEDURE VISIT (OUTPATIENT)
Dept: AUDIOLOGY | Age: 61
End: 2021-10-12
Payer: COMMERCIAL

## 2021-10-12 ENCOUNTER — OFFICE VISIT (OUTPATIENT)
Dept: ENT CLINIC | Age: 61
End: 2021-10-12
Payer: COMMERCIAL

## 2021-10-12 VITALS — SYSTOLIC BLOOD PRESSURE: 110 MMHG | DIASTOLIC BLOOD PRESSURE: 75 MMHG | HEART RATE: 108 BPM

## 2021-10-12 DIAGNOSIS — R42 DIZZY: Primary | ICD-10-CM

## 2021-10-12 DIAGNOSIS — H90.3 SENSORINEURAL HEARING LOSS (SNHL) OF BOTH EARS: Primary | ICD-10-CM

## 2021-10-12 DIAGNOSIS — H93.13 TINNITUS OF BOTH EARS: ICD-10-CM

## 2021-10-12 DIAGNOSIS — R42 DIZZINESS: ICD-10-CM

## 2021-10-12 PROCEDURE — 92557 COMPREHENSIVE HEARING TEST: CPT | Performed by: AUDIOLOGIST

## 2021-10-12 PROCEDURE — 92567 TYMPANOMETRY: CPT | Performed by: AUDIOLOGIST

## 2021-10-12 PROCEDURE — 99204 OFFICE O/P NEW MOD 45 MIN: CPT | Performed by: OTOLARYNGOLOGY

## 2021-10-12 ASSESSMENT — ENCOUNTER SYMPTOMS
VOICE CHANGE: 0
SHORTNESS OF BREATH: 0
TROUBLE SWALLOWING: 0

## 2021-10-12 NOTE — PROGRESS NOTES
[facility] Otolaryngology  Dr. Maria Eugenia Caro. MATT French Ms.Ed. New Consult       Patient Name:  Marguerite King  :  1960     CHIEF C/O:    Chief Complaint   Patient presents with    Other     np DIZZINESS x1 MONTH       HISTORY OBTAINED FROM:  patient    HISTORY OF PRESENT ILLNESS:       Pratibha Dominguez is a 63y.o. year old female, here today for evaluation of dizziness. Patient endorses a several month history of dizziness that she describes as feeling unsteady and \"feeling off. \" She denies room spinning vertigo, nausea/vomiting, ear pressure fullness. She is unsure about any recent hearing loss. She does have tinnitus L>R. She was being seen by [facility] for this complaint previously and she was told she did not have vertigo. They performed a VNG and patient states this test also confirmed she does not have vertigo. She states valium helps minimally with her symptoms. Her last episode was one month ago and her symptoms lasted 5 days. She endorses visual disturbance during the episodes. Remote history of migraines.        Past Medical History:   Diagnosis Date    Asthma     controlled with inhalers     Chronic pain     Chronic rhinitis     CRPS (complex regional pain syndrome), lower limb     left foot    Hyperlipidemia     Raynauds syndrome 2019    Right foot pain     for OR 20     RSD lower limb     left foot    Tinnitus      Past Surgical History:   Procedure Laterality Date    BREAST SURGERY  2009    lump left breast    BUNIONECTOMY  2011    left     SECTION  ,     ENDOMETRIAL ABLATION  2009    ESOPHAGUS SURGERY      due to gerd    FOOT SURGERY Right 2020    TARSAL TUNNEL RELEASE RIGHT FOOT performed by Lisa Lunsford DPM at [address] [place] [address]  12    paravertebral sympathetic left side #1    NERVE BLOCK  12    paravert sympathetic left    NERVE BLOCK  2012    left paravertebral sympathetic #3  NERVE BLOCK  10-01-12    left paravertebral sympathetic  #4    NERVE BLOCK  10-10-12    paravertebral sympathetic left #5    NERVE BLOCK  10/24/12    left sympathetic    NERVE BLOCK  11/12/12    paravert sympathetic block left #7    NERVE BLOCK  11-14-12    left paravertebral sympathetic left #8    NERVE BLOCK Left 7/23/14    lumbar sympathetic #1    NERVE BLOCK  07/30/14    paravertebral sympathetic left #2    NERVE BLOCK Left 8/6/14    PARAVERTEBRAL SUMPATHETIV BLOCK #3    NERVE BLOCK Left 9/5/2014    left paravertebral sympathetic #5    NERVE BLOCK Left 09/17/14    left paravertebral sympathetic nerve block #6 9/17/14    NERVE BLOCK Left 10 8 14    paravert sympathetic #7    NERVE BLOCK N/A 07/06/2016    sympathetic #1    NERVE BLOCK  07/13/2016    right parasympathic nerve block lumbar #2    NERVE BLOCK Right 07/20/2016    paravertebral sympathetic right #3    NERVE BLOCK Right 08/10/2016    lumbar parasympathetic block #4    NERVE BLOCK Right 08/17/2016    paravertebral sympathetic right #5    NERVE BLOCK Right 08/24/2016    paravertebral sympathetic right #6    OTHER SURGICAL HISTORY  03/27/2017    laparscopic incisional hernia repair with mesh    TUBAL LIGATION  1982       Current Outpatient Medications:     budesonide-formoterol (SYMBICORT) 160-4.5 MCG/ACT AERO, INHALE 2 PUFFS BY MOUTH TWICE DAILY  RINSE MOUTH AFTER USE., Disp: , Rfl:     meclizine (ANTIVERT) 25 MG tablet, TAKE ONE TABLET BY MOUTH EVERY DAY AS NEEDED, Disp: , Rfl:     naloxegol (MOVANTIK) 25 MG TABS tablet, Take 1 tablet by mouth every morning, Disp: 30 tablet, Rfl: 0    DULoxetine (CYMBALTA) 20 MG extended release capsule, Take 1 capsule by mouth daily, Disp: 30 capsule, Rfl: 2    levothyroxine (SYNTHROID) 50 MCG tablet, TAKE ONE TABLET BY MOUTH EVERY DAY, Disp: 90 tablet, Rfl: 0    pantoprazole (PROTONIX) 40 MG injection, Infuse 40 mg intravenously daily, Disp: 90 each, Rfl: 0    albuterol (PROVENTIL) (2.5 MG/3ML) 0.083% nebulizer solution, INHALE 1/2 VIAL VIA NEBULIZER FOUR TIMES A DAY AS NEEDED FOR COUGH, Disp: 120 each, Rfl: 0    atorvastatin (LIPITOR) 40 MG tablet, Take 0.5 tablets by mouth daily, Disp: 90 tablet, Rfl: 1    levalbuterol (XOPENEX HFA) 45 MCG/ACT inhaler, Inhale 1 puff into the lungs every 4 hours as needed for Wheezing, Disp: 1 Inhaler, Rfl: 3    albuterol sulfate HFA (VENTOLIN HFA) 108 (90 Base) MCG/ACT inhaler, Inhale 2 puffs into the lungs every 6 hours as needed for Wheezing, Disp: 18 g, Rfl: 2    Calcium Carb-Cholecalciferol (CALCIUM 1000 + D) 1000-800 MG-UNIT TABS, Take 1 tablet by mouth daily, Disp: 90 tablet, Rfl: 1    Folinic Acid-Vit B6-Vit B12 (FOLINIC-PLUS) 4-50-2 MG TABS, TAKE ONE TABLET BY MOUTH EVERY DAY, Disp: 90 tablet, Rfl: 0    HYDROcodone-acetaminophen (NORCO) 7.5-325 MG per tablet, Take 1 tablet by mouth every 6 hours as needed for Pain., Disp: , Rfl:     Ginkgo Biloba 40 MG TABS, Take 1 tablet by mouth daily, Disp: , Rfl:     alendronate (FOSAMAX) 70 MG tablet, Take 70 mg by mouth every 7 days, Disp: , Rfl:     Dextromethorphan-guaiFENesin (MUCINEX DM MAXIMUM STRENGTH)  MG TB12, Take 1 tablet by mouth 2 times daily, Disp: 28 tablet, Rfl: 0    azelastine (ASTELIN) 0.1 % nasal spray, USE 2 SPRAYS IN EACH NOSTRIL TWICE DAILY AS DIRECTED, Disp: 30 mL, Rfl: 2    NIFEdipine (PROCARDIA) 10 MG capsule, TAKE ONE CAPSULE BY MOUTH TWO TIMES A DAY, Disp: , Rfl: 5    SPIRIVA RESPIMAT 2.5 MCG/ACT AERS inhaler, INHALE TWO PUFFS BY MOUTH once EVERY DAY, Disp: , Rfl: 5    vitamin B-12 (CYANOCOBALAMIN) 100 MCG tablet, Take 50 mcg by mouth daily. , Disp: , Rfl:     vitamin E 400 UNIT capsule, Take 400 Units by mouth daily. , Disp: , Rfl:     HYDROcodone-acetaminophen (NORCO) 5-325 MG per tablet, Take 1 tablet by mouth every 6 hours as needed for Pain for up to 30 days THIS PRESCRIPTION IS A 30 DAY SUPPLY. ( ICD-10:  G 89.4).  Earliest Fill Date: 1/23/18, Disp: 120 tablet, Rfl: 0  Patient has no known allergies. Social History     Tobacco Use    Smoking status: Former Smoker     Packs/day: 0.50     Years: 30.00     Pack years: 15.00     Types: Cigarettes     Quit date: 2017     Years since quittin.6    Smokeless tobacco: Never Used   Vaping Use    Vaping Use: Never used   Substance Use Topics    Alcohol use: Yes     Alcohol/week: 2.0 standard drinks     Types: 2 Cans of beer per week     Comment: occasional    Drug use: No     Family History   Problem Relation Age of Onset    Cancer Father         colon    Hypertension Mother     Kidney Disease Mother        Review of Systems   Constitutional: Negative for chills and fever. HENT: Positive for hearing loss and tinnitus. Negative for trouble swallowing and voice change. Respiratory: Negative for shortness of breath. Neurological: Positive for dizziness. Negative for seizures, weakness and light-headedness. All other systems reviewed and are negative. /75 (Site: Left Upper Arm, Position: Sitting, Cuff Size: Medium Adult)   Pulse 108   Physical Exam  Constitutional:       General: She is not in acute distress. Appearance: Normal appearance. HENT:      Head: Normocephalic and atraumatic. Right Ear: Tympanic membrane and ear canal normal.      Left Ear: Tympanic membrane and ear canal normal.      Nose: Congestion present. Mouth/Throat:      Mouth: Mucous membranes are moist.      Pharynx: No oropharyngeal exudate. Eyes:      Extraocular Movements: Extraocular movements intact. Pupils: Pupils are equal, round, and reactive to light. Cardiovascular:      Rate and Rhythm: Normal rate. Pulmonary:      Effort: Pulmonary effort is normal.   Musculoskeletal:         General: Normal range of motion. Cervical back: Normal range of motion and neck supple. Lymphadenopathy:      Cervical: No cervical adenopathy. Skin:     General: Skin is warm and dry.    Neurological: General: No focal deficit present. Mental Status: She is alert and oriented to person, place, and time. Psychiatric:         Mood and Affect: Mood normal.         Behavior: Behavior normal.         IMPRESSION/PLAN:  Patient seen and examined for dizziness of unknown etiology. Patient does not have clinical evidence of room spinning vertigo and her recent VNG confirms this as well. Crystal Damon testing in the office was also negative today. We do not suspect that her symptoms are related to an inner ear pathology. Recommend follow up with neurology for further evaluation and workup given her history of migraines and visual disturbance. Patient may also benefit from vestibular/balance therapy. No further follow up with ENT at this time. Electronically signed by Cecilia Larkin DO on 10/12/2021 at 8:23 AM    Dr. Becka Darnell.  Otolaryngology Facial Plastic Surgery  :[facility] Otol[facility]acial [facility]astic Surgery [facility]  Associate Clinical Professor:  Josiah Lafleur  1960      I have discussed the case, including pertinent history and exam findings with the resident. I have seen and examined the patient and the key elements of the encounter have been performed by me. I agree with the assessment, plan and orders as documented by the resident. Patient here for follow up of medical problems. Remainder of medical problems as per resident note.       [address],   10/28/21

## 2021-10-12 NOTE — LETTER
[facility]  [address] [place] [address]  Phone: [phone]  Fax: [address] [address] [address], DO        October 12, 2021     Patient: Marcus Ibarra   YOB: 1960   Date of Visit: 10/12/2021       To Whom it May Concern:    Adolfo Payne was seen in my clinic on 10/12/2021. She may return to work Wednesday 10/13/21. If you have any questions or concerns, please don't hesitate to call.     Sincerely,         Yamileth Terrazas, DO

## 2021-10-28 ENCOUNTER — TELEPHONE (OUTPATIENT)
Dept: PRIMARY CARE CLINIC | Age: 61
End: 2021-10-28

## 2021-10-28 DIAGNOSIS — G89.4 CHRONIC PAIN SYNDROME: Primary | ICD-10-CM

## 2021-11-03 ENCOUNTER — TELEPHONE (OUTPATIENT)
Dept: PRIMARY CARE CLINIC | Age: 61
End: 2021-11-03

## 2021-11-03 DIAGNOSIS — K59.00 CONSTIPATION, UNSPECIFIED CONSTIPATION TYPE: ICD-10-CM

## 2021-11-03 NOTE — TELEPHONE ENCOUNTER
Attempted referral to Dr. Jason Stallings for pain management for patient. Advised per Dr. Ríos Severe office unable to see patient if dismissed from another practice.     Patient admits was dismissed from practice with Frank Kiser    Patient aware unable to complete referral.

## 2021-11-21 DIAGNOSIS — J44.9 CHRONIC OBSTRUCTIVE PULMONARY DISEASE, UNSPECIFIED COPD TYPE (HCC): ICD-10-CM

## 2021-11-22 RX ORDER — LEVALBUTEROL TARTRATE 45 UG/1
AEROSOL, METERED ORAL
Qty: 15 G | Refills: 0 | Status: SHIPPED
Start: 2021-11-22 | End: 2021-12-22

## 2021-12-03 ENCOUNTER — IMMUNIZATION (OUTPATIENT)
Dept: PRIMARY CARE CLINIC | Age: 61
End: 2021-12-03
Payer: COMMERCIAL

## 2021-12-03 PROCEDURE — 0064A COVID-19, MODERNA BOOSTER VACCINE 0.25ML DOSE: CPT | Performed by: NURSE PRACTITIONER

## 2021-12-03 PROCEDURE — 91306 COVID-19, MODERNA BOOSTER VACCINE 0.25ML DOSE: CPT | Performed by: NURSE PRACTITIONER

## 2021-12-20 DIAGNOSIS — E03.9 ACQUIRED HYPOTHYROIDISM: ICD-10-CM

## 2021-12-20 DIAGNOSIS — J44.9 CHRONIC OBSTRUCTIVE PULMONARY DISEASE, UNSPECIFIED COPD TYPE (HCC): ICD-10-CM

## 2021-12-22 RX ORDER — LEVALBUTEROL TARTRATE 45 UG/1
AEROSOL, METERED ORAL
Qty: 15 G | Refills: 0 | Status: SHIPPED
Start: 2021-12-22 | End: 2022-01-11 | Stop reason: SDUPTHER

## 2021-12-22 RX ORDER — LEVOTHYROXINE SODIUM 0.05 MG/1
TABLET ORAL
Qty: 90 TABLET | Refills: 0 | Status: SHIPPED
Start: 2021-12-22 | End: 2022-01-11 | Stop reason: SDUPTHER

## 2022-01-11 ENCOUNTER — OFFICE VISIT (OUTPATIENT)
Dept: FAMILY MEDICINE CLINIC | Age: 62
End: 2022-01-11
Payer: COMMERCIAL

## 2022-01-11 ENCOUNTER — OFFICE VISIT (OUTPATIENT)
Dept: PRIMARY CARE CLINIC | Age: 62
End: 2022-01-11
Payer: COMMERCIAL

## 2022-01-11 VITALS
WEIGHT: 132.7 LBS | HEART RATE: 110 BPM | TEMPERATURE: 97.3 F | BODY MASS INDEX: 22.65 KG/M2 | HEIGHT: 64 IN | SYSTOLIC BLOOD PRESSURE: 132 MMHG | OXYGEN SATURATION: 100 % | DIASTOLIC BLOOD PRESSURE: 78 MMHG

## 2022-01-11 VITALS
HEART RATE: 101 BPM | WEIGHT: 132 LBS | OXYGEN SATURATION: 100 % | SYSTOLIC BLOOD PRESSURE: 132 MMHG | TEMPERATURE: 97.4 F | DIASTOLIC BLOOD PRESSURE: 87 MMHG | RESPIRATION RATE: 17 BRPM | BODY MASS INDEX: 22.53 KG/M2 | HEIGHT: 64 IN

## 2022-01-11 DIAGNOSIS — J06.9 VIRAL UPPER RESPIRATORY TRACT INFECTION: ICD-10-CM

## 2022-01-11 DIAGNOSIS — M81.0 AGE-RELATED OSTEOPOROSIS WITHOUT CURRENT PATHOLOGICAL FRACTURE: ICD-10-CM

## 2022-01-11 DIAGNOSIS — Z20.822 SUSPECTED COVID-19 VIRUS INFECTION: ICD-10-CM

## 2022-01-11 DIAGNOSIS — E03.9 ACQUIRED HYPOTHYROIDISM: ICD-10-CM

## 2022-01-11 DIAGNOSIS — G62.9 NEUROPATHY: ICD-10-CM

## 2022-01-11 DIAGNOSIS — M25.561 POSTERIOR RIGHT KNEE PAIN: ICD-10-CM

## 2022-01-11 DIAGNOSIS — D72.819 LEUKOPENIA, UNSPECIFIED TYPE: ICD-10-CM

## 2022-01-11 DIAGNOSIS — E78.2 MIXED HYPERLIPIDEMIA: ICD-10-CM

## 2022-01-11 DIAGNOSIS — J40 BRONCHITIS WITH TRACHEITIS: ICD-10-CM

## 2022-01-11 DIAGNOSIS — R42 VERTIGO: Primary | ICD-10-CM

## 2022-01-11 DIAGNOSIS — J44.9 CHRONIC OBSTRUCTIVE PULMONARY DISEASE, UNSPECIFIED COPD TYPE (HCC): ICD-10-CM

## 2022-01-11 DIAGNOSIS — Z20.822 CLOSE EXPOSURE TO COVID-19 VIRUS: Primary | ICD-10-CM

## 2022-01-11 DIAGNOSIS — Z12.31 ENCOUNTER FOR SCREENING MAMMOGRAM FOR MALIGNANT NEOPLASM OF BREAST: ICD-10-CM

## 2022-01-11 LAB
Lab: NORMAL
PERFORMING INSTRUMENT: NORMAL
QC PASS/FAIL: NORMAL
SARS-COV-2, POC: NORMAL

## 2022-01-11 PROCEDURE — 99214 OFFICE O/P EST MOD 30 MIN: CPT | Performed by: FAMILY MEDICINE

## 2022-01-11 PROCEDURE — 99213 OFFICE O/P EST LOW 20 MIN: CPT | Performed by: NURSE PRACTITIONER

## 2022-01-11 PROCEDURE — 87426 SARSCOV CORONAVIRUS AG IA: CPT | Performed by: NURSE PRACTITIONER

## 2022-01-11 RX ORDER — METHYLPREDNISOLONE 4 MG/1
TABLET ORAL
Qty: 1 KIT | Refills: 0 | Status: SHIPPED | OUTPATIENT
Start: 2022-01-11 | End: 2022-01-17

## 2022-01-11 RX ORDER — LEVOTHYROXINE SODIUM 0.05 MG/1
TABLET ORAL
Qty: 90 TABLET | Refills: 1 | Status: SHIPPED | OUTPATIENT
Start: 2022-01-11

## 2022-01-11 RX ORDER — LEUCOVORIN/PYRIDOX/MECOBALAMIN 4-50-2 MG
TABLET ORAL
Qty: 90 TABLET | Refills: 0 | Status: SHIPPED
Start: 2022-01-11 | End: 2022-06-13 | Stop reason: SDUPTHER

## 2022-01-11 RX ORDER — ALBUTEROL SULFATE 90 UG/1
2 AEROSOL, METERED RESPIRATORY (INHALATION) EVERY 6 HOURS PRN
Qty: 18 G | Refills: 2 | Status: SHIPPED | OUTPATIENT
Start: 2022-01-11

## 2022-01-11 RX ORDER — DULOXETIN HYDROCHLORIDE 20 MG/1
CAPSULE, DELAYED RELEASE ORAL
Qty: 90 CAPSULE | Refills: 0 | Status: SHIPPED
Start: 2022-01-11 | End: 2022-06-13 | Stop reason: SDUPTHER

## 2022-01-11 RX ORDER — LEVALBUTEROL TARTRATE 45 UG/1
AEROSOL, METERED ORAL
Qty: 15 G | Refills: 2 | Status: SHIPPED
Start: 2022-01-11 | End: 2022-06-09

## 2022-01-11 RX ORDER — PSYLLIUM HUSK 0.4 G
1 CAPSULE ORAL DAILY
Qty: 90 TABLET | Refills: 1 | Status: SHIPPED | OUTPATIENT
Start: 2022-01-11

## 2022-01-11 RX ORDER — AZITHROMYCIN 250 MG/1
250 TABLET, FILM COATED ORAL SEE ADMIN INSTRUCTIONS
Qty: 6 TABLET | Refills: 0 | Status: SHIPPED | OUTPATIENT
Start: 2022-01-11 | End: 2022-01-16

## 2022-01-11 RX ORDER — ATORVASTATIN CALCIUM 40 MG/1
20 TABLET, FILM COATED ORAL DAILY
Qty: 90 TABLET | Refills: 1 | Status: SHIPPED
Start: 2022-01-11 | End: 2022-07-25

## 2022-01-11 NOTE — PATIENT INSTRUCTIONS
[address]  Phone: [phone]  Fax: [phone]    GINA Bustamante CNP      1/11/2022     Patient: Mark Klein   YOB: 1960       To Whom It May Concern: It is my medical opinion that Mark Klein should remain out of work while acutely ill or awaiting COVID-19 test results. Patient tested in our office today. Return to work with no retesting should be followed if test is negative AND meets any/all these criteria as outlined by CDC/ODH:   a. No fever without the use of fever reducers for 24 hours  b. Improvement in symptoms  c. At least 5 days since the onset of symptoms     If tests positive for COVID-19, needs minimum of 5 days strict quarantine based upon CDC guidelines, improvement of symptoms and 24 hours fever free without fever reducing medications. There is no need to retest following initial diagnosis for a return to work. Pt has been instructed to follow up with their PCP if symptoms persist.    If you have any questions or concerns, please don't hesitate to call.     Sincerely,        GINA Bustamante CNP

## 2022-01-11 NOTE — PROGRESS NOTES
Chief Complaint       Sinus Problem (sinus congestion and drainage, fatique, cough , scratchy throat started yesterday )    History of Present Illness   Source of history provided by:  patient. Harry Mcmullen is a 63 y.o. old female presenting to the walk in clinic for evaluation of above symptoms, for x 1-2 days. Denies any diarrhea, nausea CP, dyspnea, LE edema, abdominal pain, vomiting, rash, or lethargy. Denies hx of asthma or COPD; ddenies tobacco use. Patient reports recent sick exposures. Patient has  been vaccinated for COVID-19. Patient has been taking no OTC for symptomatic relief. Works outside [employer]. Able to eat & drink. PCP called in medication for her after visit today. ROS    Unless otherwise stated in this report or unable to obtain because of the patient's clinical or mental status as evidenced by the medical record, this patients's positive and negative responses for Review of Systems, constitutional, psych, eyes, ENT, cardiovascular, respiratory, gastrointestinal, neurological, genitourinary, musculoskeletal, integument systems and systems related to the presenting problem are either stated in the preceding or were not pertinent or were negative for the symptoms and/or complaints related to the medical problem. Past Medical History:  has a past medical history of Asthma, Chronic pain, Chronic rhinitis, CRPS (complex regional pain syndrome), lower limb, Hyperlipidemia, Raynauds syndrome, Right foot pain, RSD lower limb, and Tinnitus. Past Surgical History:  has a past surgical history that includes  section (, ); Tubal ligation (); Hemorrhoid surgery (); Bunionectomy (); Esophagus surgery (); Endometrial ablation (); Nerve Block (); Nerve Block (12); Nerve Block (2012); Nerve Block (10-01-12); Nerve Block (10-10-12); Nerve Block (10/24/12); Nerve Block (12); Nerve Block (12); Nerve Block (Left, 14);  Nerve Block (07/30/14); Nerve Block (Left, 8/6/14); Nerve Block (Left, 9/5/2014); Nerve Block (Left, 09/17/14); Nerve Block (Left, 10 8 14); Nerve Block (N/A, 07/06/2016); Nerve Block (07/13/2016); Nerve Block (Right, 07/20/2016); Nerve Block (Right, 08/10/2016); Nerve Block (Right, 08/17/2016); Nerve Block (Right, 08/24/2016); other surgical history (03/27/2017); Breast surgery (2009); hernia repair; and Foot surgery (Right, 8/26/2020). Social History:  reports that she quit smoking about 4 years ago. Her smoking use included cigarettes. She has a 15.00 pack-year smoking history. She has never used smokeless tobacco. She reports current alcohol use of about 2.0 standard drinks of alcohol per week. She reports that she does not use drugs. Family History: family history includes Cancer in her father; Hypertension in her mother; Kidney Disease in her mother. Allergies: Patient has no known allergies. Physical Exam         VS:  /87   Pulse 101   Temp 97.4 °F (36.3 °C) (Temporal)   Resp 17   Ht 5' 4\" (1.626 m)   Wt 132 lb (59.9 kg)   SpO2 100%   BMI 22.66 kg/m²    Oxygen Saturation Interpretation: Normal.    Constitutional:  Alert, development consistent with age. NAD. Head:  NC/NT. Airway patent. Cerumen noted. Mouth: Posterior pharynx with mild erythema and clear postnasal drip. No tonsillar hypertrophy or exudate. Neck:  Normal ROM. Supple. No anterior cervical adenopathy noted. Lungs: CTAB without wheezes, rales, or rhonchi. CV:  Regular rate and rhythm, normal heart sounds, without pathological murmurs, ectopy, gallops, or rubs. Skin:  Normal turgor. Warm, dry, without visible rash. Lymphatic: No lymphangitis or adenopathy noted. Neurological:  Oriented. Motor functions intact. Lab / Imaging Results   (All laboratory and radiology results have been personally reviewed by myself)  Labs:  No results found for this visit on 01/11/22. Imaging:   All Radiology results interpreted by Radiologist unless otherwise noted. No results found for this visit on 01/11/22. Assessment / Plan     Impression(s):  Art Goel was seen today for sinus problem. Diagnoses and all orders for this visit:    Close exposure to COVID-19 virus  -     POCT COVID-19, Antigen (-) in office today  - PCP called medication for patient  - Red Flag items & conservative methods discussed including OTC methods & Coricidin medication  - F/u with PCP if symptoms persist  - Work/school letter provided in AVS if requested    Disposition:  Disposition: Discharge to home. Advised cautionary self-quarantine at home in the interim. Increase fluids and rest. Symptomatic relief discussed including Tylenol prn pain/fever. Schedule virtual f/u with PCP in 7-10 days if symptoms persist. ED sooner if symptoms worsen or change. ED immediately with high or refractory fever, progressive SOB, dyspnea, CP, calf pain/swelling, shaking chills, vomiting, abdominal pain, lethargy, flank pain, or decreased urinary output. Pt verbalizes understanding and is in agreement with plan of care. All questions answered. Clarisse Del Rio, GINA - CNP    **This report was transcribed using voice recognition software. Every effort was made to ensure accuracy; however, inadvertent computerized transcription errors may be present.

## 2022-01-11 NOTE — PROGRESS NOTES
Subjective:  63 y.o. female who presents to the office today with chief complaint:  Chief Complaint   Patient presents with    3 Month Follow-Up    Sinus Problem    Congestion       Dizziness: No significant findings per ENT. Agrees to neurology referral.     COPD: Albuterol nebulizer, spiriva; had shakiness with symbicort so she stopped this. Follows with Dr. Linnell Dandy- next appointment in April. Using xopenex inhaler 3-4 times daily depending on dust at work, weather. Concerned that oral medications caused dizziness     R knee pain: Follows with Dr. Julieta Delacruz for neuropathy- recommended referral to orthopedics for R knee pain. Agrees to referral to Dr. Blanca Chu. Health Maintenance Due   Topic Date Due    Cervical cancer screen  05/14/2021    Breast cancer screen  06/25/2021    Depression Screen  01/15/2022         Review of Systems    Review of Systems: All bolded are positive, all others are negative. General:  Fever, chills, diaphoresis, fatigue, malaise, night sweats, weight loss  Psychological:  Anxiety, disorientation, hallucinations. ENT:  Epistaxis, headaches, vertigo, visual changes. Cardiovascular:  Chest pain, irregular heartbeats, palpitations, paroxysmal nocturnal dyspnea. Respiratory:  Shortness of breath, coughing, sputum production, hemoptysis, wheezing, orthopnea.   Gastrointestinal:  Nausea, vomiting, diarrhea, heartburn, constipation, abdominal pain, hematemesis, hematochezia, melena, acholic stools  Genito-Urinary:  Dysuria, urgency, frequency, hematuria  Musculoskeletal:  Joint pain, joint stiffness, joint swelling, muscle pain  Neurology:  Headache, focal neurological deficits, weakness, numbness, paresthesia  Derm:  Rashes, ulcers, excoriations, bruising  Extremities:  Decreased ROM, peripheral edema, mottling      Objective:  Vitals:    01/11/22 1453   BP: 132/78   Pulse: 110   Temp: 97.3 °F (36.3 °C)   SpO2: 100%     Physical exam:     General: Patient is pleasant, cooperative, oriented, in no acute distress. Neck: Full range of motion noted. Pulm: Patient speaks in full sentences. She has a dry cough. MSK: Full range of motion of right knee noted. Physical examination limited today due to patient being newly symptomatic of COVID-19.     Results for orders placed or performed during the hospital encounter of 07/23/21   Troponin   Result Value Ref Range    Troponin, High Sensitivity <6 0 - 9 ng/L   CBC auto differential   Result Value Ref Range    WBC 4.8 4.5 - 11.5 E9/L    RBC 4.12 3.50 - 5.50 E12/L    Hemoglobin 13.0 11.5 - 15.5 g/dL    Hematocrit 38.8 34.0 - 48.0 %    MCV 94.2 80.0 - 99.9 fL    MCH 31.6 26.0 - 35.0 pg    MCHC 33.5 32.0 - 34.5 %    RDW 12.3 11.5 - 15.0 fL    Platelets 232 224 - 893 E9/L    MPV 9.5 7.0 - 12.0 fL    Neutrophils % 57.0 43.0 - 80.0 %    Immature Granulocytes % 0.4 0.0 - 5.0 %    Lymphocytes % 31.0 20.0 - 42.0 %    Monocytes % 7.5 2.0 - 12.0 %    Eosinophils % 3.1 0.0 - 6.0 %    Basophils % 1.0 0.0 - 2.0 %    Neutrophils Absolute 2.74 1.80 - 7.30 E9/L    Immature Granulocytes # 0.02 E9/L    Lymphocytes Absolute 1.49 (L) 1.50 - 4.00 E9/L    Monocytes Absolute 0.36 0.10 - 0.95 E9/L    Eosinophils Absolute 0.15 0.05 - 0.50 E9/L    Basophils Absolute 0.05 0.00 - 0.20 E9/L   Comprehensive metabolic panel   Result Value Ref Range    Sodium 144 132 - 146 mmol/L    Potassium 4.7 3.5 - 5.0 mmol/L    Chloride 108 (H) 98 - 107 mmol/L    CO2 23 22 - 29 mmol/L    Anion Gap 13 7 - 16 mmol/L    Glucose 98 74 - 99 mg/dL    BUN 23 (H) 6 - 20 mg/dL    CREATININE 0.8 0.5 - 1.0 mg/dL    GFR Non-African American >60 >=60 mL/min/1.73    GFR African American >60     Calcium 10.3 (H) 8.6 - 10.2 mg/dL    Total Protein 7.4 6.4 - 8.3 g/dL    Albumin 4.7 3.5 - 5.2 g/dL    Total Bilirubin 0.4 0.0 - 1.2 mg/dL    Alkaline Phosphatase 127 (H) 35 - 104 U/L    ALT 53 (H) 0 - 32 U/L    AST 39 (H) 0 - 31 U/L   TSH without Reflex   Result Value Ref Range    TSH 6.260 (H) 0.270 - 4.200 uIU/mL   EKG 12 Lead   Result Value Ref Range    Ventricular Rate 87 BPM    Atrial Rate 87 BPM    P-R Interval 120 ms    QRS Duration 72 ms    Q-T Interval 370 ms    QTc Calculation (Bazett) 445 ms    P Axis 71 degrees    R Axis 51 degrees    T Axis 74 degrees         Assessment and Plan:  Catherine Alamo was seen today for 3 month follow-up, sinus problem and congestion. Diagnoses and all orders for this visit:    Acquired hypothyroidism    Chronic obstructive pulmonary disease, unspecified COPD type ([address]    Leukopenia, unspecified type    Neuropathy    Age-related osteoporosis without current pathological fracture    Mixed hyperlipidemia    Bronchitis with tracheitis        Dizziness: Refer to [facility] neurology at recommendation of ENT. COPD: Continue to follow with Dr. Christopher Irizarry. Breathing at baseline. Viral URI suspect COVID-19: Z-Paco and Medrol Dosepak ordered for patient due to history of COPD. Patient became symptomatic today with malaise, sinus congestion, cough, sore throat. Posterior right knee pain: X-ray will be completed. Referral sent to Dr. Jennifer Guzman with orthopedics at the behest of podiatry. Hypothyroidism: Clinic CBC, CMP, TSH, lipid panel.       Geovanny Robledo DO  10/11/2021  3:12 PM

## 2022-01-12 ENCOUNTER — TELEPHONE (OUTPATIENT)
Dept: PRIMARY CARE CLINIC | Age: 62
End: 2022-01-12

## 2022-01-12 NOTE — TELEPHONE ENCOUNTER
Patient called again verifying if you would give her a work excuse for a few days. Asking if she can get a call by the end of the day, so she knows if she can miss work tomorrow.

## 2022-01-12 NOTE — TELEPHONE ENCOUNTER
----- Message from Lane Chao sent at 1/12/2022 10:30 AM EST -----  Subject: Message to Provider    QUESTIONS  Information for Provider? pt called in stating that she tested negative   for the Covid but still feels absolutely awful and wanted to know if Dr Jessica Díaz could send over a note stating she needed to be off a few days   to recover. ---------------------------------------------------------------------------  --------------  Sandra STRANGE  What is the best way for the office to contact you? OK to leave message on   voicemail  Preferred Call Back Phone Number? [phone]  ---------------------------------------------------------------------------  --------------  SCRIPT ANSWERS  Relationship to Patient?  Self

## 2022-01-14 DIAGNOSIS — E03.9 ACQUIRED HYPOTHYROIDISM: ICD-10-CM

## 2022-01-14 LAB
ALBUMIN SERPL-MCNC: 4.6 G/DL (ref 3.5–5.2)
ALP BLD-CCNC: 91 U/L (ref 35–104)
ALT SERPL-CCNC: 38 U/L (ref 0–32)
ANION GAP SERPL CALCULATED.3IONS-SCNC: 18 MMOL/L (ref 7–16)
AST SERPL-CCNC: 29 U/L (ref 0–31)
BILIRUB SERPL-MCNC: <0.2 MG/DL (ref 0–1.2)
BUN BLDV-MCNC: 23 MG/DL (ref 6–23)
CALCIUM SERPL-MCNC: 9.4 MG/DL (ref 8.6–10.2)
CHLORIDE BLD-SCNC: 108 MMOL/L (ref 98–107)
CHOLESTEROL, TOTAL: 200 MG/DL (ref 0–199)
CO2: 19 MMOL/L (ref 22–29)
CREAT SERPL-MCNC: 0.9 MG/DL (ref 0.5–1)
GFR AFRICAN AMERICAN: >60
GFR NON-AFRICAN AMERICAN: >60 ML/MIN/1.73
GLUCOSE BLD-MCNC: 102 MG/DL (ref 74–99)
HCT VFR BLD CALC: 38.5 % (ref 34–48)
HDLC SERPL-MCNC: 59 MG/DL
HEMOGLOBIN: 12.3 G/DL (ref 11.5–15.5)
LDL CHOLESTEROL CALCULATED: 99 MG/DL (ref 0–99)
MCH RBC QN AUTO: 31.2 PG (ref 26–35)
MCHC RBC AUTO-ENTMCNC: 31.9 % (ref 32–34.5)
MCV RBC AUTO: 97.7 FL (ref 80–99.9)
PDW BLD-RTO: 12.4 FL (ref 11.5–15)
PLATELET # BLD: 230 E9/L (ref 130–450)
PMV BLD AUTO: 10 FL (ref 7–12)
POTASSIUM SERPL-SCNC: 4.1 MMOL/L (ref 3.5–5)
RBC # BLD: 3.94 E12/L (ref 3.5–5.5)
SODIUM BLD-SCNC: 145 MMOL/L (ref 132–146)
TOTAL PROTEIN: 7.2 G/DL (ref 6.4–8.3)
TRIGL SERPL-MCNC: 211 MG/DL (ref 0–149)
TSH SERPL DL<=0.05 MIU/L-ACNC: 2.55 UIU/ML (ref 0.27–4.2)
VLDLC SERPL CALC-MCNC: 42 MG/DL
WBC # BLD: 6.8 E9/L (ref 4.5–11.5)

## 2022-01-25 RX ORDER — PANTOPRAZOLE SODIUM 40 MG/1
TABLET, DELAYED RELEASE ORAL
Qty: 90 TABLET | Refills: 0 | Status: SHIPPED
Start: 2022-01-25 | End: 2022-05-03

## 2022-01-26 ENCOUNTER — OFFICE VISIT (OUTPATIENT)
Dept: ORTHOPEDIC SURGERY | Age: 62
End: 2022-01-26
Payer: COMMERCIAL

## 2022-01-26 VITALS — WEIGHT: 132 LBS | TEMPERATURE: 98 F | HEIGHT: 64 IN | BODY MASS INDEX: 22.53 KG/M2

## 2022-01-26 DIAGNOSIS — S83.206A TEAR OF MENISCUS OF RIGHT KNEE, UNSPECIFIED MENISCUS, UNSPECIFIED TEAR TYPE, UNSPECIFIED WHETHER OLD OR CURRENT TEAR: Primary | ICD-10-CM

## 2022-01-26 PROCEDURE — 99203 OFFICE O/P NEW LOW 30 MIN: CPT | Performed by: ORTHOPAEDIC SURGERY

## 2022-01-26 NOTE — PROGRESS NOTES
Chief Complaint   Patient presents with    Knee Pain     Right knee pain behind knee for about 6-8 months. No previous treatment for knee. DId exercises at home. HPI:    Patient is 63 y.o. female complaining chronic, atraumatic, insidious onset Right knee pain behind knee for about 6-8 months. No previous treatment for knee. DId exercises at home. She admits to stiffness, deep, aching pain, swelling, difficulty with stairs and ambulating far distances. She denies gross instability. Previous treatments include rest, ice, heat, NSAIDs, HEP without much relief. ROS:    Skin: (-) rash,(-) psoriasis,(-) eczema, (-)skin cancer. Neurologic: (-)numbness, (-)tingling, (-)headaches, (-) LOC. Cardiovascular: (-) Chest pain, (-) swelling in legs/feet, (-) SOB, (-) cramping in legs/feet with walking.     All other review of systems negative except stated above or in HPI      Past Medical History:   Diagnosis Date    Asthma     controlled with inhalers     Chronic pain     Chronic rhinitis     CRPS (complex regional pain syndrome), lower limb     left foot    Hyperlipidemia     Raynauds syndrome     Right foot pain     for OR 20     RSD lower limb     left foot    Tinnitus      Past Surgical History:   Procedure Laterality Date    BREAST SURGERY  2009    lump left breast    BUNIONECTOMY  2011    left     SECTION  ,     ENDOMETRIAL ABLATION  2009    ESOPHAGUS SURGERY      due to gerd    FOOT SURGERY Right 2020    TARSAL TUNNEL RELEASE RIGHT FOOT performed by Helena Dominguez DPM at [address] [place] [address]  12    paravertebral sympathetic left side #1    NERVE BLOCK  12    paravert sympathetic left    NERVE BLOCK  2012    left paravertebral sympathetic #3    NERVE BLOCK  10-01-12    left paravertebral sympathetic  #4    NERVE BLOCK  10-10-12    paravertebral sympathetic left #5    NERVE BLOCK  10/24/12    left sympathetic    NERVE BLOCK  11/12/12    paravert sympathetic block left #7    NERVE BLOCK  11-14-12    left paravertebral sympathetic left #8    NERVE BLOCK Left 7/23/14    lumbar sympathetic #1    NERVE BLOCK  07/30/14    paravertebral sympathetic left #2    NERVE BLOCK Left 8/6/14    PARAVERTEBRAL SUMPATHETIV BLOCK #3    NERVE BLOCK Left 9/5/2014    left paravertebral sympathetic #5    NERVE BLOCK Left 09/17/14    left paravertebral sympathetic nerve block #6 9/17/14    NERVE BLOCK Left 10 8 14    paravert sympathetic #7    NERVE BLOCK N/A 07/06/2016    sympathetic #1    NERVE BLOCK  07/13/2016    right parasympathic nerve block lumbar #2    NERVE BLOCK Right 07/20/2016    paravertebral sympathetic right #3    NERVE BLOCK Right 08/10/2016    lumbar parasympathetic block #4    NERVE BLOCK Right 08/17/2016    paravertebral sympathetic right #5    NERVE BLOCK Right 08/24/2016    paravertebral sympathetic right #6    OTHER SURGICAL HISTORY  03/27/2017    laparscopic incisional hernia repair with mesh    TUBAL LIGATION  1982       Current Outpatient Medications:     pantoprazole (PROTONIX) 40 MG tablet, TAKE ONE TABLET BY MOUTH EVERY DAY, Disp: 90 tablet, Rfl: 0    levothyroxine (SYNTHROID) 50 MCG tablet, TAKE ONE TABLET BY MOUTH EVERY DAY, Disp: 90 tablet, Rfl: 1    levalbuterol (XOPENEX HFA) 45 MCG/ACT inhaler, INHALE ONE PUFF BY MOUTH EVERY 4 HOURS AS NEEDED FOR WHEEZING, Disp: 15 g, Rfl: 2    Folinic Acid-Vit B6-Vit B12 (FOLINIC-PLUS) 4-50-2 MG TABS, TAKE ONE TABLET BY MOUTH EVERY DAY, Disp: 90 tablet, Rfl: 0    DULoxetine (CYMBALTA) 20 MG extended release capsule, TAKE ONE CAPSULE BY MOUTH DAILY, Disp: 90 capsule, Rfl: 0    Calcium Carb-Cholecalciferol (CALCIUM 1000 + D) 1000-800 MG-UNIT TABS, Take 1 tablet by mouth daily, Disp: 90 tablet, Rfl: 1    atorvastatin (LIPITOR) 40 MG tablet, Take 0.5 tablets by mouth daily, Disp: 90 tablet, Rfl: 1    albuterol sulfate HFA (VENTOLIN HFA) 108 (90 Base) MCG/ACT inhaler, Inhale 2 puffs into the lungs every 6 hours as needed for Wheezing, Disp: 18 g, Rfl: 2    naloxegol (MOVANTIK) 25 MG TABS tablet, Take 1 tablet by mouth every morning, Disp: 30 tablet, Rfl: 2    meclizine (ANTIVERT) 25 MG tablet, TAKE ONE TABLET BY MOUTH EVERY DAY AS NEEDED, Disp: , Rfl:     albuterol (PROVENTIL) (2.5 MG/3ML) 0.083% nebulizer solution, INHALE 1/2 VIAL VIA NEBULIZER FOUR TIMES A DAY AS NEEDED FOR COUGH, Disp: 120 each, Rfl: 0    HYDROcodone-acetaminophen (NORCO) 7.5-325 MG per tablet, Take 1 tablet by mouth every 6 hours as needed for Pain., Disp: , Rfl:     Ginkgo Biloba 40 MG TABS, Take 1 tablet by mouth daily, Disp: , Rfl:     alendronate (FOSAMAX) 70 MG tablet, Take 70 mg by mouth every 7 days, Disp: , Rfl:     Dextromethorphan-guaiFENesin (MUCINEX DM MAXIMUM STRENGTH)  MG TB12, Take 1 tablet by mouth 2 times daily, Disp: 28 tablet, Rfl: 0    azelastine (ASTELIN) 0.1 % nasal spray, USE 2 SPRAYS IN EACH NOSTRIL TWICE DAILY AS DIRECTED, Disp: 30 mL, Rfl: 2    NIFEdipine (PROCARDIA) 10 MG capsule, TAKE ONE CAPSULE BY MOUTH TWO TIMES A DAY, Disp: , Rfl: 5    SPIRIVA RESPIMAT 2.5 MCG/ACT AERS inhaler, INHALE TWO PUFFS BY MOUTH once EVERY DAY, Disp: , Rfl: 5    HYDROcodone-acetaminophen (NORCO) 5-325 MG per tablet, Take 1 tablet by mouth every 6 hours as needed for Pain for up to 30 days THIS PRESCRIPTION IS A 30 DAY SUPPLY. ( ICD-10:  G 89.4). Earliest Fill Date: 1/23/18, Disp: 120 tablet, Rfl: 0    vitamin B-12 (CYANOCOBALAMIN) 100 MCG tablet, Take 50 mcg by mouth daily. , Disp: , Rfl:     vitamin E 400 UNIT capsule, Take 400 Units by mouth daily.   , Disp: , Rfl:   No Known Allergies  Social History     Socioeconomic History    Marital status: Single     Spouse name: Not on file    Number of children: 2    Years of education: Not on file    Highest education level: Not on file   Occupational History    Occupation:  Employer: [employer]   Tobacco Use    Smoking status: Former Smoker     Packs/day: 0.50     Years: 30.00     Pack years: 15.00     Types: Cigarettes     Quit date: 2017     Years since quittin.9    Smokeless tobacco: Never Used   Vaping Use    Vaping Use: Never used   Substance and Sexual Activity    Alcohol use: Yes     Alcohol/week: 2.0 standard drinks     Types: 2 Cans of beer per week     Comment: occasional    Drug use: No    Sexual activity: Not on file   Other Topics Concern    Not on file   Social History Narrative    Not on file     Social Determinants of Health     Financial Resource Strain: Low Risk     Difficulty of Paying Living Expenses: Not hard at all   Food Insecurity: No Food Insecurity    Worried About Running Out of Food in the Last Year: Never true    [address] in the Last Year: Never true   Transportation Needs:     Lack of Transportation (Medical): Not on file    Lack of Transportation (Non-Medical):  Not on file   Physical Activity:     Days of Exercise per Week: Not on file    Minutes of Exercise per Session: Not on file   Stress:     Feeling of Stress : Not on file   Social Connections:     Frequency of Communication with Friends and Family: Not on file    Frequency of Social Gatherings with Friends and Family: Not on file    Attends Congregational Services: Not on file    Active Member of [address] [place] [address] or Organizations: Not on file    Attends Club or Organization Meetings: Not on file    Marital Status: Not on file   Intimate Partner Violence:     Fear of Current or Ex-Partner: Not on file    Emotionally Abused: Not on file    Physically Abused: Not on file    Sexually Abused: Not on file   Housing Stability:     Unable to Pay for Housing in the Last Year: Not on file    Number of [place] in the Last Year: Not on file    Unstable Housing in the Last Year: Not on file     Family History   Problem Relation Age of Onset    Cancer Father colon    Hypertension Mother     Kidney Disease Mother            Physical Exam:    Temp 98 °F (36.7 °C)   Ht 5' 4\" (1.626 m)   Wt 132 lb (59.9 kg)   BMI 22.66 kg/m²     GENERAL: alert, appears stated age, cooperative, no acute distress    HEENT: Head is normocephalic, atraumatic. PERRLA. SKIN: Clean, dry, intact. There is not any cellulitis or cutaneous lesions noted in the lower extremities except noted in MSK    PULMONARY: breathing is regular and unlabored, no acute distress    CV: The bilateral upper and lower extremities are warm and well-perfused with brisk capillary refill. 2+ pulses UE and LE bilateral.     PSYCHIATRY: Pleasant mood, appropriate behavior, follows commands    NEURO: Sensation is intact distally with light touch with no alteration. Motor exam of the lower extremities show quadriceps, hamstrings, foot dorsiflexion and plantarflexion grossly intact 5/5. LYMPH: No lymphedema present distally in upper or lower extremity. MUSCULOSKELETAL:    Right Knee Exam:    mild effusion noted. No erythema/induration/fluctuance. Posterior medial joint line TTP. Stable to varus and valgus at 0 and 30 degrees of flexion. Negative Lachman's and posterior drawer. Negative patellar grind test and J sign. Compartments soft and compressible throughout leg. Active range of motion 0-120 with pain. Positive Francisco Javier's positive Apley's, gait is antalgic  Right AFO in place. Imaging:  XR KNEE RIGHT (3 VIEWS)    Result Date: 1/14/2022  EXAMINATION: THREE XRAY VIEWS OF THE RIGHT KNEE 1/14/2022 11:48 am COMPARISON: None. HISTORY: ORDERING SYSTEM PROVIDED HISTORY: Posterior right knee pain FINDINGS: No evidence of acute fracture or dislocation. No focal osseous lesion. No evidence of joint effusion. No focal soft tissue abnormality. No acute abnormality of the knee. Shanice Shay was seen today for knee pain.     Diagnoses and all orders for this visit:    Tear of meniscus of right knee, unspecified meniscus, unspecified tear type, unspecified whether old or current tear  -     MRI KNEE RIGHT WO CONTRAST; Future        Patient seen and examined. X-rays reviewed. Patient has little to no arthritis noted on x-rays today. However patient's main complaint is instability of symptomatic knee. Exam and history is consistent with possible meniscus tear. MRI recommended for further evaluation management.   Follow-up after MRI        Tish Cranker, DO  1/26/22

## 2022-01-27 DIAGNOSIS — K59.00 CONSTIPATION, UNSPECIFIED CONSTIPATION TYPE: ICD-10-CM

## 2022-01-28 RX ORDER — NALOXEGOL OXALATE 25 MG/1
TABLET, FILM COATED ORAL
Qty: 90 TABLET | Refills: 0 | Status: SHIPPED
Start: 2022-01-28 | End: 2022-05-03

## 2022-02-14 ENCOUNTER — HOSPITAL ENCOUNTER (OUTPATIENT)
Dept: MAMMOGRAPHY | Age: 62
Discharge: HOME | End: 2022-02-16
Payer: COMMERCIAL

## 2022-02-14 ENCOUNTER — HOSPITAL ENCOUNTER (OUTPATIENT)
Dept: MRI IMAGING | Age: 62
Discharge: HOME | End: 2022-02-16
Payer: COMMERCIAL

## 2022-02-14 VITALS — WEIGHT: 130 LBS | HEIGHT: 64 IN | BODY MASS INDEX: 22.2 KG/M2

## 2022-02-14 DIAGNOSIS — Z12.31 ENCOUNTER FOR SCREENING MAMMOGRAM FOR MALIGNANT NEOPLASM OF BREAST: ICD-10-CM

## 2022-02-14 DIAGNOSIS — S83.206A TEAR OF MENISCUS OF RIGHT KNEE, UNSPECIFIED MENISCUS, UNSPECIFIED TEAR TYPE, UNSPECIFIED WHETHER OLD OR CURRENT TEAR: ICD-10-CM

## 2022-02-14 PROCEDURE — 73721 MRI JNT OF LWR EXTRE W/O DYE: CPT

## 2022-02-14 PROCEDURE — 77067 SCR MAMMO BI INCL CAD: CPT

## 2022-02-23 ENCOUNTER — OFFICE VISIT (OUTPATIENT)
Dept: NEUROLOGY | Age: 62
End: 2022-02-23
Payer: COMMERCIAL

## 2022-02-23 VITALS
OXYGEN SATURATION: 97 % | WEIGHT: 130 LBS | DIASTOLIC BLOOD PRESSURE: 90 MMHG | TEMPERATURE: 97.6 F | SYSTOLIC BLOOD PRESSURE: 135 MMHG | BODY MASS INDEX: 22.2 KG/M2 | HEIGHT: 64 IN | HEART RATE: 77 BPM

## 2022-02-23 DIAGNOSIS — R42 VERTIGO: Primary | ICD-10-CM

## 2022-02-23 PROCEDURE — 99204 OFFICE O/P NEW MOD 45 MIN: CPT | Performed by: PHYSICIAN ASSISTANT

## 2022-02-23 RX ORDER — DIAZEPAM 2 MG/1
2 TABLET ORAL EVERY 8 HOURS PRN
Qty: 15 TABLET | Refills: 0 | Status: SHIPPED | OUTPATIENT
Start: 2022-02-23 | End: 2022-03-05
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
## 2022-02-25 ENCOUNTER — OFFICE VISIT (OUTPATIENT)
Dept: ORTHOPEDIC SURGERY | Age: 62
End: 2022-02-25
Payer: COMMERCIAL

## 2022-02-25 VITALS — HEIGHT: 64 IN | WEIGHT: 130 LBS | BODY MASS INDEX: 22.2 KG/M2

## 2022-02-25 DIAGNOSIS — M25.561 RIGHT KNEE PAIN, UNSPECIFIED CHRONICITY: Primary | ICD-10-CM

## 2022-02-25 DIAGNOSIS — M54.16 LUMBAR RADICULOPATHY: ICD-10-CM

## 2022-02-25 DIAGNOSIS — Z71.82 EXERCISE COUNSELING: ICD-10-CM

## 2022-02-25 DIAGNOSIS — R42 VERTIGO: ICD-10-CM

## 2022-02-25 DIAGNOSIS — M22.2X9 DISORDER OF PATELLOFEMORAL JOINT, UNSPECIFIED LATERALITY: ICD-10-CM

## 2022-02-25 DIAGNOSIS — R20.2 COMPLAINT OF PARESTHESIA: ICD-10-CM

## 2022-02-25 LAB
FOLATE: >20 NG/ML (ref 4.8–24.2)
HBA1C MFR BLD: 5.4 % (ref 4–5.6)
VITAMIN B-12: 1419 PG/ML (ref 211–946)

## 2022-02-25 PROCEDURE — 99214 OFFICE O/P EST MOD 30 MIN: CPT | Performed by: ORTHOPAEDIC SURGERY

## 2022-02-25 NOTE — PROGRESS NOTES
Chief Complaint   Patient presents with    Knee Pain     Right knee MRI follow up         HPI:    Patient is 63 y.o. female complaining chronic, atraumatic, insidious onset Right knee pain behind knee for about 6-8 months. No previous treatment for knee. DId exercises at home. She admits to stiffness, deep, aching pain, swelling, difficulty with stairs and ambulating far distances. She denies gross instability. Previous treatments include rest, ice, heat, NSAIDs, HEP without much relief. Follows up after MRI. ROS:    Skin: (-) rash,(-) psoriasis,(-) eczema, (-)skin cancer. Neurologic: (-)numbness, (-)tingling, (-)headaches, (-) LOC. Cardiovascular: (-) Chest pain, (-) swelling in legs/feet, (-) SOB, (-) cramping in legs/feet with walking.     All other review of systems negative except stated above or in HPI      Past Medical History:   Diagnosis Date    Asthma     controlled with inhalers     BRCA1 negative     Patient thinks she had the genetic testing but don't know the results     Chronic pain     Chronic rhinitis     CRPS (complex regional pain syndrome), lower limb     left foot    Hyperlipidemia     Raynauds syndrome     Right foot pain     for OR 20     RSD lower limb     left foot    Tinnitus      Past Surgical History:   Procedure Laterality Date    BREAST SURGERY  2009    lump left breast    BUNIONECTOMY      left     SECTION  ,     ENDOMETRIAL ABLATION  2009    ESOPHAGUS SURGERY      due to gerd    FOOT SURGERY Right 2020    TARSAL TUNNEL RELEASE RIGHT FOOT performed by Silvestre Montgomery DPM at [address] [place] [address]  12    paravertebral sympathetic left side #1    NERVE BLOCK  12    paravert sympathetic left    NERVE BLOCK  2012    left paravertebral sympathetic #3    NERVE BLOCK  10-01-12    left paravertebral sympathetic  #4    NERVE BLOCK  10-10-12 paravertebral sympathetic left #5    NERVE BLOCK  10/24/12    left sympathetic    NERVE BLOCK  11/12/12    paravert sympathetic block left #7    NERVE BLOCK  11-14-12    left paravertebral sympathetic left #8    NERVE BLOCK Left 7/23/14    lumbar sympathetic #1    NERVE BLOCK  07/30/14    paravertebral sympathetic left #2    NERVE BLOCK Left 8/6/14    PARAVERTEBRAL SUMPATHETIV BLOCK #3    NERVE BLOCK Left 9/5/2014    left paravertebral sympathetic #5    NERVE BLOCK Left 09/17/14    left paravertebral sympathetic nerve block #6 9/17/14    NERVE BLOCK Left 10 8 14    paravert sympathetic #7    NERVE BLOCK N/A 07/06/2016    sympathetic #1    NERVE BLOCK  07/13/2016    right parasympathic nerve block lumbar #2    NERVE BLOCK Right 07/20/2016    paravertebral sympathetic right #3    NERVE BLOCK Right 08/10/2016    lumbar parasympathetic block #4    NERVE BLOCK Right 08/17/2016    paravertebral sympathetic right #5    NERVE BLOCK Right 08/24/2016    paravertebral sympathetic right #6    OTHER SURGICAL HISTORY  03/27/2017    laparscopic incisional hernia repair with mesh    TUBAL LIGATION  1982       Current Outpatient Medications:     MOVANTIK 25 MG TABS tablet, TAKE ONE TABLET BY MOUTH EVERY MORNING, Disp: 90 tablet, Rfl: 0    pantoprazole (PROTONIX) 40 MG tablet, TAKE ONE TABLET BY MOUTH EVERY DAY, Disp: 90 tablet, Rfl: 0    levothyroxine (SYNTHROID) 50 MCG tablet, TAKE ONE TABLET BY MOUTH EVERY DAY, Disp: 90 tablet, Rfl: 1    levalbuterol (XOPENEX HFA) 45 MCG/ACT inhaler, INHALE ONE PUFF BY MOUTH EVERY 4 HOURS AS NEEDED FOR WHEEZING, Disp: 15 g, Rfl: 2    Folinic Acid-Vit B6-Vit B12 (FOLINIC-PLUS) 4-50-2 MG TABS, TAKE ONE TABLET BY MOUTH EVERY DAY, Disp: 90 tablet, Rfl: 0    DULoxetine (CYMBALTA) 20 MG extended release capsule, TAKE ONE CAPSULE BY MOUTH DAILY, Disp: 90 capsule, Rfl: 0    Calcium Carb-Cholecalciferol (CALCIUM 1000 + D) 1000-800 MG-UNIT TABS, Take 1 tablet by mouth daily, Disp: 90 tablet, Rfl: 1    atorvastatin (LIPITOR) 40 MG tablet, Take 0.5 tablets by mouth daily, Disp: 90 tablet, Rfl: 1    albuterol sulfate HFA (VENTOLIN HFA) 108 (90 Base) MCG/ACT inhaler, Inhale 2 puffs into the lungs every 6 hours as needed for Wheezing, Disp: 18 g, Rfl: 2    albuterol (PROVENTIL) (2.5 MG/3ML) 0.083% nebulizer solution, INHALE 1/2 VIAL VIA NEBULIZER FOUR TIMES A DAY AS NEEDED FOR COUGH, Disp: 120 each, Rfl: 0    alendronate (FOSAMAX) 70 MG tablet, Take 70 mg by mouth every 7 days, Disp: , Rfl:     Dextromethorphan-guaiFENesin (MUCINEX DM MAXIMUM STRENGTH)  MG TB12, Take 1 tablet by mouth 2 times daily (Patient not taking: Reported on 2022), Disp: 28 tablet, Rfl: 0    azelastine (ASTELIN) 0.1 % nasal spray, USE 2 SPRAYS IN EACH NOSTRIL TWICE DAILY AS DIRECTED, Disp: 30 mL, Rfl: 2    NIFEdipine (PROCARDIA) 10 MG capsule, TAKE ONE CAPSULE BY MOUTH TWO TIMES A DAY, Disp: , Rfl: 5    SPIRIVA RESPIMAT 2.5 MCG/ACT AERS inhaler, INHALE TWO PUFFS BY MOUTH once EVERY DAY, Disp: , Rfl: 5    HYDROcodone-acetaminophen (NORCO) 5-325 MG per tablet, Take 1 tablet by mouth every 6 hours as needed for Pain for up to 30 days THIS PRESCRIPTION IS A 30 DAY SUPPLY. ( ICD-10:  G 89.4). Earliest Fill Date: 18, Disp: 120 tablet, Rfl: 0    vitamin B-12 (CYANOCOBALAMIN) 100 MCG tablet, Take 50 mcg by mouth daily. , Disp: , Rfl:     vitamin E 400 UNIT capsule, Take 400 Units by mouth daily.   , Disp: , Rfl:   No Known Allergies  Social History     Socioeconomic History    Marital status: Single     Spouse name: Not on file    Number of children: 2    Years of education: Not on file    Highest education level: Not on file   Occupational History    Occupation: [employer]     Employer: [employer]   Tobacco Use    Smoking status: Former Smoker     Packs/day: 0.50     Years: 30.00     Pack years: 15.00     Types: Cigarettes     Quit date: 2017     Years since quittin.1    Smokeless tobacco: Never Used   Vaping Use    Vaping Use: Never used   Substance and Sexual Activity    Alcohol use: Yes     Alcohol/week: 2.0 standard drinks     Types: 2 Cans of beer per week     Comment: occasional    Drug use: No    Sexual activity: Not on file   Other Topics Concern    Not on file   Social History Narrative    Not on file     Social Determinants of Health     Financial Resource Strain: Low Risk     Difficulty of Paying Living Expenses: Not hard at all   Food Insecurity: No Food Insecurity    Worried About Running Out of Food in the Last Year: Never true    [address] [facility] [address] in the Last Year: Never true   Transportation Needs:     Lack of Transportation (Medical): Not on file    Lack of Transportation (Non-Medical):  Not on file   Physical Activity:     Days of Exercise per Week: Not on file    Minutes of Exercise per Session: Not on file   Stress:     Feeling of Stress : Not on file   Social Connections:     Frequency of Communication with Friends and Family: Not on file    Frequency of Social Gatherings with Friends and Family: Not on file    Attends Jehovah's witness Services: Not on file    Active Member of [address] [place] [address] or Organizations: Not on file    Attends Club or Organization Meetings: Not on file    Marital Status: Not on file   Intimate Partner Violence:     Fear of Current or Ex-Partner: Not on file    Emotionally Abused: Not on file    Physically Abused: Not on file    Sexually Abused: Not on file   Housing Stability:     Unable to Pay for Housing in the Last Year: Not on file    Number of [place] in the Last Year: Not on file    Unstable Housing in the Last Year: Not on file     Family History   Problem Relation Age of Onset    Cancer Father         colon    Hypertension Mother     Kidney Disease Mother     Breast Cancer Paternal Aunt     Ovarian Cancer Paternal Aunt            Physical Exam:    Ht 5' 4\" (1.626 m)   Wt 130 lb (59 kg)   BMI 22.31 kg/m²     GENERAL: alert, appears stated age, cooperative, no acute distress    HEENT: Head is normocephalic, atraumatic. PERRLA. SKIN: Clean, dry, intact. There is not any cellulitis or cutaneous lesions noted in the lower extremities except noted in MSK    PULMONARY: breathing is regular and unlabored, no acute distress    CV: The bilateral upper and lower extremities are warm and well-perfused with brisk capillary refill. 2+ pulses UE and LE bilateral.     PSYCHIATRY: Pleasant mood, appropriate behavior, follows commands    NEURO: Sensation is intact distally with light touch with no alteration. Motor exam of the lower extremities show quadriceps, hamstrings, foot dorsiflexion and plantarflexion grossly intact 5/5. LYMPH: No lymphedema present distally in upper or lower extremity. MUSCULOSKELETAL:    Right Knee Exam:    mild effusion noted. No erythema/induration/fluctuance. Posterior medial joint line TTP. Stable to varus and valgus at 0 and 30 degrees of flexion. Negative Lachman's and posterior drawer. Negative patellar grind test and J sign. Compartments soft and compressible throughout leg. Active range of motion 0-120 with pain. Positive Francisco Javier's positive Apley's, gait is antalgic  Right AFO in place. no change since last visit. Imaging:  XR KNEE RIGHT (3 VIEWS)    Result Date: 1/14/2022  EXAMINATION: THREE XRAY VIEWS OF THE RIGHT KNEE 1/14/2022 11:48 am COMPARISON: None. HISTORY: ORDERING SYSTEM PROVIDED HISTORY: Posterior right knee pain FINDINGS: No evidence of acute fracture or dislocation. No focal osseous lesion. No evidence of joint effusion. No focal soft tissue abnormality. No acute abnormality of the knee.      MRI KNEE RIGHT WO CONTRAST    Result Date: 2/14/2022  EXAMINATION: MRI OF THE RIGHT KNEE WITHOUT CONTRAST, 2/14/2022 10:12 am TECHNIQUE: Multiplanar multisequence MRI of the right knee was performed without the administration of intravenous contrast. COMPARISON: Radiographs dated 01/14/2022 HISTORY: ORDERING SYSTEM PROVIDED HISTORY: Tear of meniscus of right knee, unspecified meniscus, unspecified tear type, unspecified whether old or current tear FINDINGS: MENISCI: The menisci maintain acceptable morphology and signal characteristics. CRUCIATE LIGAMENTS: The cruciate ligaments are intact. EXTENSOR MECHANISM: The extensor mechanism is intact. LATERAL COLLATERAL LIGAMENT COMPLEX: Popliteus tendon, fibular collateral ligament, biceps femoris tendon and iliotibial band are intact. MEDIAL COLLATERAL LIGAMENT COMPLEX: The medial collateral ligament is intact. KNEE JOINT: The amount of joint fluid is physiologic. There is no Baker's cyst. There is no focal or full-thickness cartilage defect. There is no osteochondral lesion. BONE MARROW: There is no evidence of acute fracture or dislocation. There is no focal or diffuse marrow replacing process. Unremarkable MRI of the right knee. Boris Vazquez was seen today for knee pain. Diagnoses and all orders for this visit:    Right knee pain, unspecified chronicity  -     Cancel: EMG; Future    Lumbar radiculopathy  -     Ambulatory referral to [facility] of paresthesia  -     Ambulatory referral to [address] [place] [address]    Disorder of patellofemoral joint, unspecified laterality    Exercise counseling        Patient seen and examined. X-rays reviewed. Patient has little to no arthritis noted on x-rays today. However patient's main complaint is instability of symptomatic knee. Exam and history is consistent with possible meniscus tear. MRI recommended for further evaluation management. MRI reviewed with patient in detail. Natural history and course discussed with patient in long discussion  Treatment options discussed with patient in detail including risks and benefits. Patient should do well with conservative management as patient would like to avoid surgery at this time.      Referral to PMR for

## 2022-03-04 ENCOUNTER — HOSPITAL ENCOUNTER (OUTPATIENT)
Dept: MRI IMAGING | Age: 62
Discharge: HOME | End: 2022-03-06
Payer: COMMERCIAL

## 2022-03-04 DIAGNOSIS — R42 VERTIGO: ICD-10-CM

## 2022-03-04 PROCEDURE — 72141 MRI NECK SPINE W/O DYE: CPT

## 2022-03-04 PROCEDURE — 70551 MRI BRAIN STEM W/O DYE: CPT

## 2022-04-11 ENCOUNTER — OFFICE VISIT (OUTPATIENT)
Dept: PRIMARY CARE CLINIC | Age: 62
End: 2022-04-11
Payer: COMMERCIAL

## 2022-04-11 VITALS
TEMPERATURE: 97.2 F | DIASTOLIC BLOOD PRESSURE: 80 MMHG | HEIGHT: 64 IN | WEIGHT: 132 LBS | HEART RATE: 88 BPM | OXYGEN SATURATION: 96 % | BODY MASS INDEX: 22.53 KG/M2 | SYSTOLIC BLOOD PRESSURE: 120 MMHG | RESPIRATION RATE: 16 BRPM

## 2022-04-11 DIAGNOSIS — R42 VERTIGO: Primary | ICD-10-CM

## 2022-04-11 DIAGNOSIS — M25.561 POSTERIOR RIGHT KNEE PAIN: ICD-10-CM

## 2022-04-11 DIAGNOSIS — E03.9 ACQUIRED HYPOTHYROIDISM: ICD-10-CM

## 2022-04-11 DIAGNOSIS — J44.9 CHRONIC OBSTRUCTIVE PULMONARY DISEASE, UNSPECIFIED COPD TYPE (HCC): ICD-10-CM

## 2022-04-11 PROCEDURE — 99213 OFFICE O/P EST LOW 20 MIN: CPT | Performed by: FAMILY MEDICINE

## 2022-04-11 RX ORDER — DIAZEPAM 2 MG/1
TABLET ORAL
COMMUNITY
Start: 2022-02-23 | End: 2022-04-13

## 2022-04-11 RX ORDER — PREDNISONE 1 MG/1
TABLET ORAL
COMMUNITY
Start: 2022-04-07 | End: 2022-04-18 | Stop reason: ALTCHOICE

## 2022-04-11 RX ORDER — BUDESONIDE AND FORMOTEROL FUMARATE DIHYDRATE 160; 4.5 UG/1; UG/1
AEROSOL RESPIRATORY (INHALATION)
COMMUNITY
Start: 2022-03-13

## 2022-04-11 RX ORDER — AMOXICILLIN 500 MG/1
CAPSULE ORAL
COMMUNITY
Start: 2022-04-07 | End: 2022-04-18 | Stop reason: ALTCHOICE

## 2022-04-11 ASSESSMENT — PATIENT HEALTH QUESTIONNAIRE - PHQ9
SUM OF ALL RESPONSES TO PHQ QUESTIONS 1-9: 0
1. LITTLE INTEREST OR PLEASURE IN DOING THINGS: 0
2. FEELING DOWN, DEPRESSED OR HOPELESS: 0
SUM OF ALL RESPONSES TO PHQ9 QUESTIONS 1 & 2: 0
SUM OF ALL RESPONSES TO PHQ QUESTIONS 1-9: 0

## 2022-04-11 NOTE — LETTER
[address]  [address] [place] [address]  Phone: [phone]  Fax: [phone] [facility] Sheridan Ferris DO        April 11, 2022     Patient: Coco Ng   YOB: 1960   Date of Visit: 4/11/2022       To Whom It May Concern:    Jeffrey Wilkerson was present in my clinic today. If you have any questions or concerns, please don't hesitate to call.     Sincerely,        Lupe Lee, DO

## 2022-04-11 NOTE — PROGRESS NOTES
Subjective:  63 y.o. female who presents to the office today with chief complaint:  Chief Complaint   Patient presents with    Check-Up     Patient states she is still having vertigo Patient is seeing [facility] neurology had MRI and blood work completed has follow up 4/13/2022 with neurology. Patient upcoming appointment with Dr Elier Orourke tomorrow.  Health Maintenance     Pap test will schedule with Dr Nuria Link. Dizziness: Following neurology- note reviewed. Work-up for central vertigo, ?vertiginous migraine. Increased valium dose. No vestib rehab. COPD: Albuterol nebulizer, spiriva; had shakiness with symbicort so she stopped this. Follows with Dr. Linda Sheffield- treated for sinusitis w amoxicillin and prednisone. R knee pain: Followed up w Dr. Sisi Irizarry- imaging completed. Referred to Dr. Elier Orourke- No OA on imaging. Vipin't w PM&R is 4/12. Hypothyroidism: Feeling fatigued. Taking synthroid 50mcg daily. HLD: Currently on atorvastatin 20mg two tabs daily. Health Maintenance Due   Topic Date Due    Cervical cancer screen  05/14/2021    Shingles Vaccine (2 of 2) 02/25/2022   GYN care: Dr. Nuria Link. Review of Systems    Review of Systems: All bolded are positive, all others are negative. General:  Fever, chills, diaphoresis, fatigue, malaise, night sweats, weight loss  Psychological:  Anxiety, disorientation, hallucinations. ENT:  Epistaxis, headaches, vertigo, visual changes. Cardiovascular:  Chest pain, irregular heartbeats, palpitations, paroxysmal nocturnal dyspnea. Respiratory:  Shortness of breath, coughing, sputum production, hemoptysis, wheezing, orthopnea. Gastrointestinal:  Nausea, vomiting, diarrhea, heartburn, constipation, abdominal pain, hematemesis, hematochezia, melena, acholic stools, ?umbilical hernia- does not want work-up at this time.    Genito-Urinary:  Dysuria, urgency, frequency, hematuria  Musculoskeletal:  Joint pain, joint stiffness, joint swelling, muscle pain  Neurology: Headache, focal neurological deficits, weakness, numbness, paresthesia- R UE. Derm:  Rashes, ulcers, excoriations, bruising  Extremities:  Decreased ROM, peripheral edema, mottling      Objective:  Vitals:    04/11/22 1532   BP: 120/80   Pulse: 88   Resp: 16   Temp: 97.2 °F (36.2 °C)   SpO2: 96%     Physical exam:     General: Patient is pleasant, cooperative, oriented, in no acute distress. Neck: Full range of motion noted. No adenopathy. Cardiac: Regular rate and rhythm without murmur/gallop/rub/click. Pulm: Patient speaks in full sentences. Fair air movement noted in all lung fields. Abdomen: Soft, nondistended, nontender. Bowel sounds in all 4 quadrants. Results for orders placed or performed in visit on 02/25/22   Hemoglobin A1C   Result Value Ref Range    Hemoglobin A1C 5.4 4.0 - 5.6 %   Vitamin B12 & Folate   Result Value Ref Range    Vitamin B-12 1419 (H) 211 - 946 pg/mL    Folate >20.0 4.8 - 24.2 ng/mL         Assessment and Plan:  Sarika was seen today for check-up and health maintenance. Diagnoses and all orders for this visit:    Vertigo    Posterior right knee pain    Chronic obstructive pulmonary disease, unspecified COPD type ([facility] [address]    Acquired hypothyroidism        Vertigo: Continue to follow with neurology. COPD: Continue to follow with Dr. Irvin Granger. Breathing at baseline. Hyperlipidemia: Continue current dose of atorvastatin. Posterior right knee pain: Continue to follow with Dr. Eileen Boyle and Dr. Soco Roman. Hypothyroidism: Continue current dose.       Kota Robledo DO  4/12/2022  7:38 AM

## 2022-04-12 ENCOUNTER — TELEPHONE (OUTPATIENT)
Dept: PHYSICAL MEDICINE AND REHAB | Age: 62
End: 2022-04-12

## 2022-04-12 ENCOUNTER — OFFICE VISIT (OUTPATIENT)
Dept: PHYSICAL MEDICINE AND REHAB | Age: 62
End: 2022-04-12
Payer: COMMERCIAL

## 2022-04-12 VITALS
HEIGHT: 64 IN | SYSTOLIC BLOOD PRESSURE: 118 MMHG | BODY MASS INDEX: 22.71 KG/M2 | DIASTOLIC BLOOD PRESSURE: 79 MMHG | WEIGHT: 133 LBS | HEART RATE: 86 BPM

## 2022-04-12 DIAGNOSIS — R20.2 PARESTHESIA: Primary | ICD-10-CM

## 2022-04-12 DIAGNOSIS — D75.9 BONE MARROW DISEASE: ICD-10-CM

## 2022-04-12 DIAGNOSIS — G56.03 BILATERAL CARPAL TUNNEL SYNDROME: ICD-10-CM

## 2022-04-12 PROCEDURE — 99204 OFFICE O/P NEW MOD 45 MIN: CPT | Performed by: PHYSICAL MEDICINE & REHABILITATION

## 2022-04-12 NOTE — PATIENT INSTRUCTIONS
Patient Education        Lumbar Spinal Stenosis: Care Instructions  Your Care Instructions     Stenosis in the spine is a narrowing of the canal that is around the spinal cord and nerve roots in your back. It can happen as part of aging. Sometimes bone and other tissue grow into this canal and press on the nerves that branch out from the spinal cord. This can cause pain, numbness, and weakness. When it happens in the lower part of your back, it is called lumbar spinal stenosis. Itcan cause problems in the legs, feet, and rear end (buttocks). You may be able to get relief from the symptoms of spinal stenosis by taking pain medicine. Your doctor may suggest physical therapy and exercises to keep your spine strong and flexible. Some people try steroid shots to reduce swelling. If pain and numbness in your legs are still so bad that you cannot doyour normal activities, you may need surgery. Follow-up care is a key part of your treatment and safety. Be sure to make and go to all appointments, and call your doctor if you are having problems. It's also a good idea to know your test results and keep alist of the medicines you take. How can you care for yourself at home?  Take an over-the-counter pain medicine. Nonsteroidal anti-inflammatory drugs (NSAIDs) such as ibuprofen or naproxen seem to work best. But if you can't take NSAIDs, you can try acetaminophen. Be safe with medicines. Read and follow all instructions on the label.  Do not take two or more pain medicines at the same time unless the doctor told you to. Many pain medicines have acetaminophen, which is Tylenol. Too much acetaminophen (Tylenol) can be harmful.  Stay at a healthy weight. Being overweight puts extra strain on your spine.  Change positions often when you sit or stand. This can ease pain. It may also reduce pressure on the spinal cord and its nerves.  Avoid doing things that make your symptoms worse.  Walking downhill and standing for a long time may cause pain.  Stretch and strengthen your back muscles as your doctor or physical therapist recommends. If your doctor says it is okay to do them, these exercises may help. ? Lie on your back with your knees bent. Gently pull one bent knee to your chest. Put that foot back on the floor, and then pull the other knee to your chest.  ? Do pelvic tilts. Lie on your back with your knees bent. Tighten your stomach muscles. Pull your belly button (navel) in and up toward your ribs. You should feel like your back is pressing to the floor and your hips and pelvis are slightly lifting off the floor. Hold for 6 seconds while breathing smoothly. ? Stand with your back flat against a wall. Slowly slide down until your knees are slightly bent. Hold for 10 seconds, then slide back up the wall.  Remove or change anything in your house that may cause you to fall. Keep walkways clear of clutter, electrical cords, and throw rugs. When should you call for help? Call 911 anytime you think you may need emergency care. For example, call if:     You are unable to move a leg at all. Call your doctor now or seek immediate medical care if:     You have new or worse symptoms in your legs, belly, or buttocks. Symptoms may include:  ? Numbness or tingling. ? Weakness. ? Pain.      You lose bladder or bowel control. Watch closely for changes in your health, and be sure to contact your doctor if:     You have a fever, lose weight, or don't feel well.      You are not getting better as expected. Where can you learn more? Go to [email or web address] org and sign in to your [employer] account. Enter K769 in the [employer] box to learn more about \"Lumbar Spinal Stenosis: Care Instructions. \"     If you do not have an account, please click on the \"Sign Up Now\" link. Current as of: July 1, 2021               Content Version: 13.2  © 5272-7711 Healthwise, [employer].    Care instructions adapted under license by [facility] ([facility]). If you have questions about a medical condition or this instruction, always ask your healthcare professional. [place] [address] any warranty or liability for your use of this information. Patient Education        Back Pain, Emergency or Urgent Symptoms: Care Instructions  Your Care Instructions     Many people have back pain at one time or another. In most cases, pain gets better with self-care that includes over-the-counter pain medicine, ice, heat,and exercises. Unless you have symptoms of a severe injury or heart attack, you may be able to give yourself a few days before you call a doctor. But some back problems arevery serious. Do not ignore symptoms that need to be checked right away. Follow-up care is a key part of your treatment and safety. Be sure to make and go to all appointments, and call your doctor if you are having problems. It's also a good idea to know your test results and keep alist of the medicines you take. How can you care for yourself at home?  Sit or lie in positions that are most comfortable and that reduce your pain. Try one of these positions when you lie down:  ? Lie on your back with your knees bent and supported by large pillows. ? Lie on the floor with your legs on the seat of a sofa or chair. ? Lie on your side with your knees and hips bent and a pillow between your legs. ? Lie on your stomach if it does not make pain worse.  Do not sit up in bed, and avoid soft couches and twisted positions. Bed rest can help relieve pain at first, but it delays healing. Avoid bed rest after the first day.  Change positions every 30 minutes. If you must sit for long periods of time, take breaks from sitting. Get up and walk around, or lie flat.  Try using a heating pad on a low or medium setting, for 15 to 20 minutes every 2 or 3 hours. Try a warm shower in place of one session with the heating pad.  You can also buy single-use heat wraps that last up to 8 hours. You can also try ice or cold packs on your back for 10 to 20 minutes at a time, several times a day. (Put a thin cloth between the ice pack and your skin.) This reduces pain and makes it easier to be active and exercise.  Take pain medicines exactly as directed. ? If the doctor gave you a prescription medicine for pain, take it as prescribed. ? If you are not taking a prescription pain medicine, ask your doctor if you can take an over-the-counter medicine. When should you call for help? Call 911 anytime you think you may need emergency care. For example, call if:     You are unable to move a leg at all.      You have back pain with severe belly pain.      You have symptoms of a heart attack. These may include:  ? Chest pain or pressure, or a strange feeling in the chest.  ? Sweating. ? Shortness of breath. ? Nausea or vomiting. ? Pain, pressure, or a strange feeling in the back, neck, jaw, or upper belly or in one or both shoulders or arms. ? Lightheadedness or sudden weakness. ? A fast or irregular heartbeat. After you call 911, the  may tell you to chew 1 adult-strength or 2 to 4 low-dose aspirin. Wait for an ambulance. Do not try to drive yourself. Call your doctor now or seek immediate medical care if:     You have new or worse symptoms in your arms, legs, chest, belly, or buttocks. Symptoms may include:  ? Numbness or tingling. ? Weakness. ? Pain.      You lose bladder or bowel control.      You have back pain and:  ? You have injured your back while lifting or doing some other activity. Call if the pain is severe, has not gone away after 1 or 2 days, and you cannot do your normal daily activities. ? You have had a back injury before that needed treatment. ? Your pain has lasted longer than 4 weeks. ? You have had weight loss you cannot explain. ? You have a fever. ? You are age 48 or older. ?  You have cancer now or have had it before. Watch closely for changes in your health, and be sure to contact your doctor ifyou are not getting better as expected. Where can you learn more? Go to [email or web address]://[email or web address] [email or web address] org and sign in to your [employer] account. Enter D230 in the [employer] box to learn more about \"Back Pain, Emergency or Urgent Symptoms: Care Instructions. \"     If you do not have an account, please click on the \"Sign Up Now\" link. Current as of: July 1, 2021               Content Version: 13.2  © 3133-2771 Healthwise, [employer]. Care instructions adapted under license by [facility] ([facility]). If you have questions about a medical condition or this instruction, always ask your healthcare professional. [address] any warranty or liability for your use of this information.

## 2022-04-12 NOTE — PROGRESS NOTES
Selvin Singh D.O. [facility]  [address] [facility] [address]  Phone: [phone]  Fax: [phone]      PCP: Jono Lima DO  Date of visit: 4/12/22    Chief Complaint   Patient presents with    Back Pain     New patient referred by Dr. Jeanie Keith       Dear Dr. Jeanie Keith,    As you know,  [place] Leader is a 63 y.o.  right hand dominant woman with gradual onset of right posterior knee pain and bilateral leg paresthesias after no known injury several years ago. Now, the pain is intermittent and occurs daily. The pain is rated Pain Score:   9, is described as weak, numb, burning, and is located in the low back with radiation to the bilateral thighs. The symptoms have been worse since onset. The pain is better with nothing in particular. The pain is worse with standing. I have reviewed the following prior workup has included: MRI lumbar showed lumbar stenosis and bone marrow changes with recommended follow up CBC, SPEP and bone scan. Otherwise the MRI reported diffuse disc bulging, facet degeneration, left L2-3 foraminal perineural cyst, and right L3-4, mild to moderate left and mild right L4-5 foraminal stenosis, mild L5-S1 central and and contact of the S1 nerve roots, degenerative changes in the sacroiliac joints. Whole body bone scan showed left fifth metatarsal fracture and diffuse degenerative changes. The  prior treatment has included: I have personally reviewed MRI cervical showing Degenerative changes with foraminal worse than central stenosis. I have reviewed the following medical records: Dr. Vivian Hogue office notes. An independent historian was not needed.      Past Medical History:   Diagnosis Date    Asthma     controlled with inhalers     BRCA1 negative     Patient thinks she had the genetic testing but don't know the results     Chronic pain     Chronic rhinitis 2011    CRPS (complex regional pain syndrome), lower limb     left foot    Hyperlipidemia     Raynauds syndrome 2019    Right foot pain     for OR 20     RSD lower limb     left foot    Tinnitus        Past Surgical History:   Procedure Laterality Date    BREAST SURGERY  2009    lump left breast    BUNIONECTOMY  2011    left     SECTION  ,     ENDOMETRIAL ABLATION  2009    ESOPHAGUS SURGERY      due to gerd    FOOT SURGERY Right 2020    TARSAL TUNNEL RELEASE RIGHT FOOT performed by Surekha Cerda DPM at [address]  12    paravertebral sympathetic left side #1    NERVE BLOCK  12    paravert sympathetic left    NERVE BLOCK  2012    left paravertebral sympathetic #3    NERVE BLOCK  10-01-12    left paravertebral sympathetic  #4    NERVE BLOCK  10-10-12    paravertebral sympathetic left #5    NERVE BLOCK  10/24/12    left sympathetic    NERVE BLOCK  12    paravert sympathetic block left #7    NERVE BLOCK  12    left paravertebral sympathetic left #8    NERVE BLOCK Left 14    lumbar sympathetic #1    NERVE BLOCK  14    paravertebral sympathetic left #2    NERVE BLOCK Left 14    PARAVERTEBRAL SUMPATHETIV BLOCK #3    NERVE BLOCK Left 2014    left paravertebral sympathetic #5    NERVE BLOCK Left 14    left paravertebral sympathetic nerve block #6 14    NERVE BLOCK Left 10 8 14    paravert sympathetic #7    NERVE BLOCK N/A 2016    sympathetic #1    NERVE BLOCK  2016    right parasympathic nerve block lumbar #2    NERVE BLOCK Right 2016    paravertebral sympathetic right #3    NERVE BLOCK Right 08/10/2016    lumbar parasympathetic block #4    NERVE BLOCK Right 2016    paravertebral sympathetic right #5    NERVE BLOCK Right 2016    paravertebral sympathetic right #6    OTHER SURGICAL HISTORY  2017    laparscopic incisional hernia repair with mesh    TUBAL LIGATION  1982 Social History     Tobacco Use    Smoking status: Former Smoker     Packs/day: 0.50     Years: 30.00     Pack years: 15.00     Types: Cigarettes     Quit date: 2017     Years since quittin.1    Smokeless tobacco: Never Used   Vaping Use    Vaping Use: Never used   Substance Use Topics    Alcohol use: Yes     Alcohol/week: 2.0 standard drinks     Types: 2 Cans of beer per week     Comment: occasional    Drug use: No     Works as a  at [employer].      Family History   Problem Relation Age of Onset    Cancer Father         colon    Hypertension Mother     Kidney Disease Mother     Breast Cancer Paternal Aunt     Ovarian Cancer Paternal Aunt        No Known Allergies    Current Outpatient Medications   Medication Sig Dispense Refill    amoxicillin (AMOXIL) 500 MG capsule TAKE ONE CAPSULE BY MOUTH THREE TIMES A DAY FOR 10 DAYS      predniSONE (DELTASONE) 5 MG tablet TAKE 6 TABLETS (30MG) BY MOUTH EVERY DAY FOR 2 DAYS  THEN TAKE 4 TABLETS (20MG) BY MOUTH EVERY DAY FOR 2 DAYS  THEN 2 TABLETS (10MG) BY MOUT      budesonide-formoterol (SYMBICORT) 160-4.5 MCG/ACT AERO INHALE TWO PUFFS BY MOUTH TWO TIMES A DAY  RINSE MOUTH AFTER USE      MOVANTIK 25 MG TABS tablet TAKE ONE TABLET BY MOUTH EVERY MORNING 90 tablet 0    pantoprazole (PROTONIX) 40 MG tablet TAKE ONE TABLET BY MOUTH EVERY DAY 90 tablet 0    levothyroxine (SYNTHROID) 50 MCG tablet TAKE ONE TABLET BY MOUTH EVERY DAY 90 tablet 1    levalbuterol (XOPENEX HFA) 45 MCG/ACT inhaler INHALE ONE PUFF BY MOUTH EVERY 4 HOURS AS NEEDED FOR WHEEZING 15 g 2    Folinic Acid-Vit B6-Vit B12 (FOLINIC-PLUS) 4-50-2 MG TABS TAKE ONE TABLET BY MOUTH EVERY DAY 90 tablet 0    DULoxetine (CYMBALTA) 20 MG extended release capsule TAKE ONE CAPSULE BY MOUTH DAILY 90 capsule 0    Calcium Carb-Cholecalciferol (CALCIUM 1000 + D) 1000-800 MG-UNIT TABS Take 1 tablet by mouth daily 90 tablet 1    atorvastatin (LIPITOR) 40 MG tablet Take 0.5 tablets by mouth daily (Patient taking differently: Take 40 mg by mouth daily ) 90 tablet 1    albuterol sulfate HFA (VENTOLIN HFA) 108 (90 Base) MCG/ACT inhaler Inhale 2 puffs into the lungs every 6 hours as needed for Wheezing 18 g 2    albuterol (PROVENTIL) (2.5 MG/3ML) 0.083% nebulizer solution INHALE 1/2 VIAL VIA NEBULIZER FOUR TIMES A DAY AS NEEDED FOR COUGH 120 each 0    NIFEdipine (PROCARDIA) 10 MG capsule TAKE ONE CAPSULE BY MOUTH TWO TIMES A DAY  5    SPIRIVA RESPIMAT 2.5 MCG/ACT AERS inhaler INHALE TWO PUFFS BY MOUTH once EVERY DAY  5    vitamin B-12 (CYANOCOBALAMIN) 100 MCG tablet Take 50 mcg by mouth daily.  vitamin E 400 UNIT capsule Take 400 Units by mouth daily.  diazePAM (VALIUM) 2 MG tablet TAKE ONE TABLET BY MOUTH EVERY 8 HOURS as needed for vertigo for up to 10 days (Patient not taking: Reported on 4/12/2022)      azelastine (ASTELIN) 0.1 % nasal spray USE 2 SPRAYS IN EACH NOSTRIL TWICE DAILY AS DIRECTED (Patient not taking: Reported on 4/12/2022) 30 mL 2     No current facility-administered medications for this visit. Review of Systems - For review of systems, positive symptoms are underlined and negative findings are not underlined. General: chills, fatigue, fever, malaise, night sweats, weight gain,  weight loss. Psychological: anxiety, depression, suicidal ideation, sleep disturbances, behavioral disorder, difficulty concentrating, disorientation, hallucinations, mood swings, obsessive thoughts, physical abuse,  sexual abuse. Ophthalmic: blurry vision, decreased vision, double vision, loss of vision, photophobia, use of corrective device. Ear Nose Throat: hearing loss, tinnitus, phonophobia, sensitivity to smells, vertigo, or vocal changes. Allergy/Immunology: seasonal allergies, watery eyes, itchy eyes, frequent infections. Hematological and Lymphatic: bleeding problems, blood clots, bruising,  yellowing of the skin, swollen lymph nodes.  Endocrine:  polydypsia, polyuria, temperature intolerance. Respiratory: cough, shortness of breath, wheezing. Cardiovascular: syncope, chest pain, dyspnea on exertion, edema, irregular heartbeat,  palpitations. Gastrointestinal: abdominal pain, constipation, diarrhea,  decreased appetite, heartburn, hematemesis, melena, nausea, vomiting, stool incontinence, abnormal swallowing. Genito-Urinary: dysuria, hematuria, incontinence, frequency, urgency. Musculoskeletal: joint pain, stiffness, swelling, muscle pain, muscle  tenderness. Neurological: confusion, memory loss, dizziness, gait disturbance, headaches, impaired coordination, decreased balance, numbness/tingling, seizures, speech problems, tremors,weakness. Dermatological:  hair changes, nail changes, pruritus, rash. Physical Exam: Blood pressure 118/79, pulse 86, height 5' 4\" (1.626 m), weight 133 lb (60.3 kg), not currently breastfeeding. General: The patient is in no apparent distress. Body habitus is non-obese. HEENT: No rhinorrhea, sneezing, yawning, or lacrimation. No scleral icterus or conjunctival injection. SKIN: No piloerection. No track marks. No rash. Normal turgor. No erythema or ecchymosis. Psychological: Mood and affect are appropriate. Hygiene is appropriate. Cardiovascular:  Heart is regular rate and rhythm. Peripheral pulses are 2+ at the dorsalis pedis, posterior tibial and radial arteries. There is no edema. Respiratory: Respirations are regular and unlabored. There is no cyanosis. Lymphatic: There is no cervical or inguinal lymphadenopathy. Gastrointestinal: Soft abdomen, non-tender. No pulsating abdominal mass. Genitourinary: No costovertebral angle tenderness. MSK: Cervical: Cervical lordosis normal,  thoracic kyphosis normal and lumbar lordosis normal. No superficial or bony tenderness. No tenderness to palpation at bilateral cervical paraspinals,  upper trapezius,  occipital notch,  sternocleidomastoid,  medial scapular muscles,  scalenes. No trigger points.   No spasm.   No edema, erythema, ecchymosis, effusion, instability, mass or deformity. No midline bony tenderness. Spurling is negative bilateral.  Cervical AROM in flexion is 60 degrees, in extension is 40 degrees, in left rotation is 80 degrees, in right rotation is 80 degrees, in left lateral flexion is 45 degrees and in right lateral flexion is 45 degrees. There is no crepitus. bilateral Shoulder: No edema, erythema, ecchymosis, effusion, instability, mass or deformity. Full painless ROM bilateral shoulders. No painful arc. No crepitus. No tenderness to palpation at the acromioclavicular joint, subacromial space or biceps tendon insertion. Negative Tim-Mcfadden, Scarf,  Drop arm, and Speeds. Tinel is positive bilateral wrists. Lumbar:  Pelvis level, no scoliosis, leg length equal. Seated and standing flexion tests negative. There is no step off deformity. No superficial or bony tenderness. Lumbar AROM in flexion is 90 degrees, in extension is 30 degrees, in left rotation is 30degrees, in right rotation is 30 degrees, in left lateral flexion is 30 degrees and in right lateral flexion is 30 degrees. There is tenderness to palpation at bilateral lumbar paraspinals. No tenderness to palpation at  bilateral SIJ,  gluteal muscles,  pubic tubercle,  PSIS,  ischial tuberosity,  greater trochanter,  ASIS,  iliac crest.  No trigger points. No spasm. No edema, erythema, ecchymosis,  mass or deformity. Taut bands absent. Popliteal angle is normal.  Seated straight leg raise negative bilaterally. SIJ: Gillet negative bilaterally. CHRIST negative bilaterally. ASIS compression test negative bilaterally. . Hip: Full painless AROM bilaterally. Prakash negative bilaterally. Trendelenburg negative bilaterally. Neurologic: Awake, alert and oriented in three planes. Speech is fluent. No facial weakness. Tongue is midline. Hearing is intact for conversation. Pupils are equal and round. Extraocular muscles are intact. Hearing is intact for conversation. Shoulder shrug symmetric. Sensation: Intact for light touch and pin prick in all upper and lower extremity dermatomes. Vibration and proprioception are intact at the bilateral first MTP. Strength: 5/5 in all myotomes in the upper and lower extremities. Muscle Tendon Reflexes: 2+ symmetric in the bilateral upper and lower extremities. Babinski is downgoing bilaterally. Donzell Jim is negative bilaterally. Gait is Normal.   Romberg is negative. Heel and toe walk are normal.  Tandem walk is normal.  No clonus or spasticity. The patient was able to rise from a chair and squat without difficulty. There is no tremor. Impression:   1. Paresthesia    2. Bone marrow disease    3. Bilateral carpal tunnel syndrome        Plan:   I have ordered the following unique test(s):  Orders Placed This Encounter   Procedures    MRI LUMBAR SPINE W WO CONTRAST     Standing Status:   Future     Standing Expiration Date:   4/13/2023     Order Specific Question:   STAT Creatinine as needed:     Answer:   Yes     Order Specific Question:   Reason for exam:     Answer:   abnormal marrow signal on prior mri    Electrophoresis Protein, Serum     Standing Status:   Future     Standing Expiration Date:   4/12/2023    EMG     Order Specific Question:   Which body part? Answer:   bilateral ue re cts     This has included the decision for minor procedure: EMG. Procedure risk factors were discussed. Continue ibuprofen and Tylenol. There are no discontinued medications. Medications prescribed do require monitoring for toxicity including: CMP    Diagnosis and treatment were not significantly impacted by social determinants of health. Physician directed home exercise program provided to perform daily. Patient declined PT.  The patient was educated about the diagnosis, prognosis, indications, risks and benefits of treatment.  An opportunity to ask questions was given to the patient and questions were answered. The patient agreed to proceed with the recommended treatment as described above. Return for test results. Thank you for the consultation and for allowing me to participate in the care of this patient. Sincerely,         Yamile Pierre D.O., P.T.   Board Certified Physical Medicine and Rehabilitation  Board Certified Electrodiagnostic Medicine

## 2022-04-12 NOTE — LETTER
[facility] [address]  Phone: [phone]  Fax: [phone]    Alin Willis DO        April 12, 2022      To Whom It May Concern,    Vicky Jacobo was seen in my office today 4-12-22 for an appointment. Please excuse the absence. Sincerely,      Modesta Pastrana D.O., P.T.   Board Certified Physical Medicine and Rehabilitation  Board Certified [facility] Drake Jaquez DO

## 2022-04-13 ENCOUNTER — OFFICE VISIT (OUTPATIENT)
Dept: NEUROLOGY | Age: 62
End: 2022-04-13
Payer: COMMERCIAL

## 2022-04-13 VITALS
TEMPERATURE: 97.4 F | OXYGEN SATURATION: 95 % | WEIGHT: 133 LBS | SYSTOLIC BLOOD PRESSURE: 129 MMHG | DIASTOLIC BLOOD PRESSURE: 88 MMHG | HEIGHT: 64 IN | HEART RATE: 92 BPM | BODY MASS INDEX: 22.71 KG/M2

## 2022-04-13 DIAGNOSIS — G43.109 VERTIGINOUS MIGRAINE: Primary | ICD-10-CM

## 2022-04-13 PROCEDURE — 99214 OFFICE O/P EST MOD 30 MIN: CPT | Performed by: PHYSICIAN ASSISTANT

## 2022-04-13 RX ORDER — PROPRANOLOL HCL 60 MG
60 CAPSULE, EXTENDED RELEASE 24HR ORAL DAILY
Qty: 30 CAPSULE | Refills: 0 | Status: SHIPPED
Start: 2022-04-13 | End: 2022-04-21 | Stop reason: SINTOL
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
## 2022-04-18 ENCOUNTER — HOSPITAL ENCOUNTER (EMERGENCY)
Age: 62
Discharge: HOME | End: 2022-04-18
Attending: EMERGENCY MEDICINE
Payer: COMMERCIAL

## 2022-04-18 ENCOUNTER — APPOINTMENT (OUTPATIENT)
Dept: CT IMAGING | Age: 62
End: 2022-04-18
Payer: COMMERCIAL

## 2022-04-18 ENCOUNTER — APPOINTMENT (OUTPATIENT)
Dept: GENERAL RADIOLOGY | Age: 62
End: 2022-04-18
Payer: COMMERCIAL

## 2022-04-18 VITALS
WEIGHT: 133 LBS | HEIGHT: 64 IN | OXYGEN SATURATION: 96 % | BODY MASS INDEX: 22.71 KG/M2 | DIASTOLIC BLOOD PRESSURE: 75 MMHG | TEMPERATURE: 97.2 F | SYSTOLIC BLOOD PRESSURE: 138 MMHG | HEART RATE: 83 BPM | RESPIRATION RATE: 18 BRPM

## 2022-04-18 DIAGNOSIS — R10.33 PERIUMBILICAL ABDOMINAL PAIN: Primary | ICD-10-CM

## 2022-04-18 LAB
ALBUMIN SERPL-MCNC: 4.5 G/DL (ref 3.5–5.2)
ALP BLD-CCNC: 107 U/L (ref 35–104)
ALT SERPL-CCNC: 48 U/L (ref 0–32)
ANION GAP SERPL CALCULATED.3IONS-SCNC: 12 MMOL/L (ref 7–16)
AST SERPL-CCNC: 34 U/L (ref 0–31)
BACTERIA: ABNORMAL /HPF
BASOPHILS ABSOLUTE: 0.05 E9/L (ref 0–0.2)
BASOPHILS RELATIVE PERCENT: 0.9 % (ref 0–2)
BILIRUB SERPL-MCNC: 0.3 MG/DL (ref 0–1.2)
BILIRUBIN URINE: NEGATIVE
BLOOD, URINE: NEGATIVE
BUN BLDV-MCNC: 20 MG/DL (ref 6–23)
CALCIUM SERPL-MCNC: 9.4 MG/DL (ref 8.6–10.2)
CHLORIDE BLD-SCNC: 104 MMOL/L (ref 98–107)
CLARITY: CLEAR
CO2: 24 MMOL/L (ref 22–29)
COLOR: YELLOW
CREAT SERPL-MCNC: 0.9 MG/DL (ref 0.5–1)
EKG ATRIAL RATE: 74 BPM
EKG P AXIS: 67 DEGREES
EKG P-R INTERVAL: 124 MS
EKG Q-T INTERVAL: 372 MS
EKG QRS DURATION: 74 MS
EKG QTC CALCULATION (BAZETT): 412 MS
EKG R AXIS: 67 DEGREES
EKG T AXIS: 73 DEGREES
EKG VENTRICULAR RATE: 74 BPM
EOSINOPHILS ABSOLUTE: 0.14 E9/L (ref 0.05–0.5)
EOSINOPHILS RELATIVE PERCENT: 2.6 % (ref 0–6)
EPITHELIAL CELLS, UA: ABNORMAL /HPF
GFR AFRICAN AMERICAN: >60
GFR NON-AFRICAN AMERICAN: >60 ML/MIN/1.73
GLUCOSE BLD-MCNC: 113 MG/DL (ref 74–99)
GLUCOSE URINE: NEGATIVE MG/DL
HCT VFR BLD CALC: 40.6 % (ref 34–48)
HEMOGLOBIN: 13.1 G/DL (ref 11.5–15.5)
IMMATURE GRANULOCYTES #: 0.01 E9/L
IMMATURE GRANULOCYTES %: 0.2 % (ref 0–5)
KETONES, URINE: NEGATIVE MG/DL
LACTIC ACID: 1 MMOL/L (ref 0.5–2.2)
LEUKOCYTE ESTERASE, URINE: NEGATIVE
LIPASE: 49 U/L (ref 13–60)
LYMPHOCYTES ABSOLUTE: 1.55 E9/L (ref 1.5–4)
LYMPHOCYTES RELATIVE PERCENT: 28.7 % (ref 20–42)
MCH RBC QN AUTO: 31.4 PG (ref 26–35)
MCHC RBC AUTO-ENTMCNC: 32.3 % (ref 32–34.5)
MCV RBC AUTO: 97.4 FL (ref 80–99.9)
MONOCYTES ABSOLUTE: 0.51 E9/L (ref 0.1–0.95)
MONOCYTES RELATIVE PERCENT: 9.4 % (ref 2–12)
NEUTROPHILS ABSOLUTE: 3.14 E9/L (ref 1.8–7.3)
NEUTROPHILS RELATIVE PERCENT: 58.2 % (ref 43–80)
NITRITE, URINE: NEGATIVE
PDW BLD-RTO: 13 FL (ref 11.5–15)
PH UA: 5 (ref 5–9)
PLATELET # BLD: 227 E9/L (ref 130–450)
PMV BLD AUTO: 9.6 FL (ref 7–12)
POTASSIUM REFLEX MAGNESIUM: 3.9 MMOL/L (ref 3.5–5)
PRO-BNP: 90 PG/ML (ref 0–125)
PROTEIN UA: NEGATIVE MG/DL
RBC # BLD: 4.17 E12/L (ref 3.5–5.5)
RBC UA: ABNORMAL /HPF (ref 0–2)
SODIUM BLD-SCNC: 140 MMOL/L (ref 132–146)
SPECIFIC GRAVITY UA: <=1.005 (ref 1–1.03)
TOTAL PROTEIN: 7.2 G/DL (ref 6.4–8.3)
TROPONIN, HIGH SENSITIVITY: <6 NG/L (ref 0–9)
UROBILINOGEN, URINE: 0.2 E.U./DL
WBC # BLD: 5.4 E9/L (ref 4.5–11.5)
WBC UA: ABNORMAL /HPF (ref 0–5)

## 2022-04-18 PROCEDURE — 83690 ASSAY OF LIPASE: CPT

## 2022-04-18 PROCEDURE — 74177 CT ABD & PELVIS W/CONTRAST: CPT

## 2022-04-18 PROCEDURE — 2580000003 HC RX 258: Performed by: STUDENT IN AN ORGANIZED HEALTH CARE EDUCATION/TRAINING PROGRAM

## 2022-04-18 PROCEDURE — 84484 ASSAY OF TROPONIN QUANT: CPT

## 2022-04-18 PROCEDURE — 85025 COMPLETE CBC W/AUTO DIFF WBC: CPT

## 2022-04-18 PROCEDURE — 6360000004 HC RX CONTRAST MEDICATION: Performed by: RADIOLOGY

## 2022-04-18 PROCEDURE — 81001 URINALYSIS AUTO W/SCOPE: CPT

## 2022-04-18 PROCEDURE — 83880 ASSAY OF NATRIURETIC PEPTIDE: CPT

## 2022-04-18 PROCEDURE — 80053 COMPREHEN METABOLIC PANEL: CPT

## 2022-04-18 PROCEDURE — 96375 TX/PRO/DX INJ NEW DRUG ADDON: CPT

## 2022-04-18 PROCEDURE — 99284 EMERGENCY DEPT VISIT MOD MDM: CPT

## 2022-04-18 PROCEDURE — 71045 X-RAY EXAM CHEST 1 VIEW: CPT

## 2022-04-18 PROCEDURE — 83605 ASSAY OF LACTIC ACID: CPT

## 2022-04-18 PROCEDURE — 96374 THER/PROPH/DIAG INJ IV PUSH: CPT

## 2022-04-18 PROCEDURE — 93005 ELECTROCARDIOGRAM TRACING: CPT | Performed by: STUDENT IN AN ORGANIZED HEALTH CARE EDUCATION/TRAINING PROGRAM

## 2022-04-18 PROCEDURE — 6370000000 HC RX 637 (ALT 250 FOR IP): Performed by: STUDENT IN AN ORGANIZED HEALTH CARE EDUCATION/TRAINING PROGRAM

## 2022-04-18 PROCEDURE — 6360000002 HC RX W HCPCS: Performed by: STUDENT IN AN ORGANIZED HEALTH CARE EDUCATION/TRAINING PROGRAM

## 2022-04-18 RX ORDER — MORPHINE SULFATE 2 MG/ML
2 INJECTION, SOLUTION INTRAMUSCULAR; INTRAVENOUS ONCE
Status: COMPLETED | OUTPATIENT
Start: 2022-04-18 | End: 2022-04-18

## 2022-04-18 RX ORDER — ONDANSETRON 2 MG/ML
4 INJECTION INTRAMUSCULAR; INTRAVENOUS ONCE
Status: COMPLETED | OUTPATIENT
Start: 2022-04-18 | End: 2022-04-18

## 2022-04-18 RX ORDER — 0.9 % SODIUM CHLORIDE 0.9 %
1000 INTRAVENOUS SOLUTION INTRAVENOUS ONCE
Status: COMPLETED | OUTPATIENT
Start: 2022-04-18 | End: 2022-04-18

## 2022-04-18 RX ORDER — NAPROXEN 500 MG/1
500 TABLET ORAL 2 TIMES DAILY
Qty: 14 TABLET | Refills: 0 | Status: SHIPPED | OUTPATIENT
Start: 2022-04-18 | End: 2022-04-28 | Stop reason: ALTCHOICE

## 2022-04-18 RX ADMIN — LIDOCAINE HYDROCHLORIDE: 20 SOLUTION ORAL; TOPICAL at 14:37

## 2022-04-18 RX ADMIN — ONDANSETRON 4 MG: 2 INJECTION INTRAMUSCULAR; INTRAVENOUS at 11:39

## 2022-04-18 RX ADMIN — MORPHINE SULFATE 2 MG: 2 INJECTION, SOLUTION INTRAMUSCULAR; INTRAVENOUS at 11:39

## 2022-04-18 RX ADMIN — SODIUM CHLORIDE 1000 ML: 9 INJECTION, SOLUTION INTRAVENOUS at 11:30

## 2022-04-18 RX ADMIN — IOPAMIDOL 75 ML: 755 INJECTION, SOLUTION INTRAVENOUS at 13:28

## 2022-04-18 ASSESSMENT — PAIN DESCRIPTION - PAIN TYPE: TYPE: ACUTE PAIN

## 2022-04-18 ASSESSMENT — PAIN DESCRIPTION - FREQUENCY: FREQUENCY: INTERMITTENT

## 2022-04-18 ASSESSMENT — PAIN SCALES - GENERAL
PAINLEVEL_OUTOF10: 7
PAINLEVEL_OUTOF10: 7
PAINLEVEL_OUTOF10: 8

## 2022-04-18 ASSESSMENT — PAIN DESCRIPTION - ORIENTATION: ORIENTATION: MID;UPPER

## 2022-04-18 ASSESSMENT — PAIN DESCRIPTION - LOCATION: LOCATION: ABDOMEN

## 2022-04-18 ASSESSMENT — PAIN DESCRIPTION - PROGRESSION: CLINICAL_PROGRESSION: NOT CHANGED

## 2022-04-18 ASSESSMENT — PAIN DESCRIPTION - DESCRIPTORS: DESCRIPTORS: PRESSURE

## 2022-04-18 NOTE — Clinical Note
Zulema Wong was seen and treated in our emergency department on 4/18/2022. She may return to work on 04/21/2022. If you have any questions or concerns, please don't hesitate to call.       Jose Henson, DO

## 2022-04-18 NOTE — ED PROVIDER NOTES
[id number] [facility]  Department of Emergency Medicine     Written by: Dafne Dobbins DO  Patient Name: Victoria Magdaleno  Attending Provider: No att. providers found  Admit Date: 2022 10:01 AM  MRN: [id number]                   : 1960        Chief Complaint   Patient presents with    Abdominal Pain     umbilicus, sob \"coming from my stomach\" denies emesis or diarrhea-worse after eating-onset friday    - Chief complaint    Patient is a 24-year-old female past medical history of Raynaud's, hyperlipidemia and complex regional pain syndrome. Patient presents with chief complaint of periumbilical abdominal pain. Patient states symptoms began yesterday. She describes the pain as a sharp aching sensation located around her bellybutton. Patient states the pain is worse with palpation she denies any relieving factors. Patient states that symptoms have been constant since onset. Patient denies any similar episodes in the past.  Patient denies any fevers, chills, nausea, vomiting, chest pain, cough shortness of breath. Review of Systems   Constitutional: Negative for chills and fever. HENT: Negative for ear pain, sinus pressure and sore throat. Eyes: Negative for pain, discharge and redness. Respiratory: Positive for shortness of breath. Negative for cough and wheezing. Cardiovascular: Negative for chest pain. Gastrointestinal: Positive for abdominal pain. Negative for abdominal distention, diarrhea, nausea and vomiting. Genitourinary: Negative for dysuria and frequency. Musculoskeletal: Negative for arthralgias and back pain. Skin: Negative for rash and wound. Neurological: Negative for weakness and headaches. Hematological: Negative for adenopathy. All other systems reviewed and are negative. Physical Exam  Vitals and nursing note reviewed. Constitutional:       General: She is not in acute distress. Appearance: Normal appearance.    HENT:      Head: Normocephalic and atraumatic. Nose: No congestion or rhinorrhea. Mouth/Throat:      Mouth: Mucous membranes are moist.      Pharynx: Oropharynx is clear. Eyes:      Extraocular Movements: Extraocular movements intact. Pupils: Pupils are equal, round, and reactive to light. Cardiovascular:      Rate and Rhythm: Normal rate and regular rhythm. Heart sounds: No murmur heard. No gallop. Pulmonary:      Effort: Pulmonary effort is normal. No respiratory distress. Breath sounds: No wheezing, rhonchi or rales. Abdominal:      General: Abdomen is flat. Palpations: Abdomen is soft. There is no mass. Tenderness: There is abdominal tenderness in the periumbilical area. There is no guarding. Hernia: No hernia is present. Musculoskeletal:         General: No swelling, tenderness or signs of injury. Normal range of motion. Cervical back: Normal range of motion. No rigidity. No muscular tenderness. Skin:     General: Skin is warm and dry. Capillary Refill: Capillary refill takes less than 2 seconds. Neurological:      General: No focal deficit present. Mental Status: She is alert and oriented to person, place, and time. Mental status is at baseline. Psychiatric:         Mood and Affect: Mood normal.         Behavior: Behavior normal.          Procedures   EKG #1:  Interpreted by emergency department physician unless otherwise noted. Time:  1050   Rate: 74  Rhythm: Sinus. Interpretation: EKG obtained demonstrate normal sinus rhythm, rate 74, normal axis, , no acute ST segment changes. .  Comparison: stable as compared to patient's most recent EKG. MDM  Number of Diagnoses or Management Options  Periumbilical abdominal pain  Diagnosis management comments: Patient is a 63-year-old female past med history of Raynaud's, hyperlipidemia complex regional pain syndrome. Patient Robert Wesley chief complaint periumbilical pain. Vital signs stable presentation. On physical exam heart regular rate and rhythm, lungs clear to auscultation bilaterally, abdomen soft nontender no rigidity rebound or guarding. Laboratory work obtained CBC demonstrate no acute abnormalities, CMP demonstrated mildly elevated transaminitis which is chronic, lactic acid 1, lipase 49, troponin was obtained and was less than 6, proBNP was obtained and was 90, urinalysis obtained was not indicative infection. EKG obtained demonstrate no acute ischemic changes. Chest x-ray demonstrated no acute abnormalities. CT scan abdomen pelvis demonstrated hepatic steatosis no other acute abnormalities. Patient given IV fluids, Zofran, morphine and GI cocktail with improvement symptoms. Plan consistent with abdominal pain nonspecific in etiology possibly musculoskeletal.  Vital signs reassuring, laboratory work reassuring CT scans reassuring as well. Extensive discussion held with patient. Decision was made to discharge patient. Patient was given prescription for Naprosyn. In addition patient was instructed to follow-up with primary care doctor as soon as possible. If patient notes any new worrisome symptoms she was instructed to return to emergency department for evaluation. Plan of care discussed with patient clearing discharge, all questions were answered, patient was in agreement plan of care and discharged home in stable condition.        Amount and/or Complexity of Data Reviewed  Clinical lab tests: ordered and reviewed  Tests in the radiology section of CPT®: ordered and reviewed  Decide to obtain previous medical records or to obtain history from someone other than the patient: yes    Risk of Complications, Morbidity, and/or Mortality  Presenting problems: moderate  Diagnostic procedures: moderate  Management options: moderate    Patient Progress  Patient progress: stable       ED Course as of 04/18/22 1112   Mon Apr 18, 2022   1032 ATTENDING PROVIDER ATTESTATION:     I have personally performed and/or participated in the history, exam, medical decision making, and procedures and agree with all pertinent clinical information unless otherwise noted. I have also reviewed and agree with the past medical, family and social history unless otherwise noted. I have discussed this patient in detail with the resident, and provided the instruction and education regarding patient complaining of some general mid abdominal pain with nausea starting about 2 days ago. No chest pain, palpitations or shortness of breath. No specific flank pain and no hematuria or dysuria. No fevers. No lightheadedness or syncope. No migration or radiation of pain. .  My findings/plan: Patient sitting the bed resting comfortably no distress. Heart rate regular, lungs are clear and equal.  Abdomen soft with very minimal midepigastric to supraumbilical area tenderness with no gross distention and no guarding, rebound tenderness or rigidity. No particular right upper or right lower quadrant pain at this time. No CVA tenderness. No palpable masses. Arms legs are neurovascular intact. No jaundice or icterus. [NC]      ED Course User Index  [NC] Hola Giles DO      --------------------------------------------- PAST HISTORY ---------------------------------------------  Past Medical History:  has a past medical history of Asthma, BRCA1 negative, Chronic pain, Chronic rhinitis, CRPS (complex regional pain syndrome), lower limb, Hyperlipidemia, Raynauds syndrome, Right foot pain, RSD lower limb, and Tinnitus. Past Surgical History:  has a past surgical history that includes  section (, ); Tubal ligation (); Hemorrhoid surgery (); Bunionectomy (); Esophagus surgery (); Endometrial ablation (); Nerve Block (); Nerve Block (12); Nerve Block (2012); Nerve Block (10-01-12); Nerve Block (10-10-12); Nerve Block (10/24/12); Nerve Block (12);  Nerve Block (11-14-12); Nerve Block (Left, 7/23/14); Nerve Block (07/30/14); Nerve Block (Left, 8/6/14); Nerve Block (Left, 9/5/2014); Nerve Block (Left, 09/17/14); Nerve Block (Left, 10 8 14); Nerve Block (N/A, 07/06/2016); Nerve Block (07/13/2016); Nerve Block (Right, 07/20/2016); Nerve Block (Right, 08/10/2016); Nerve Block (Right, 08/17/2016); Nerve Block (Right, 08/24/2016); other surgical history (03/27/2017); Breast surgery (2009); hernia repair; and Foot surgery (Right, 8/26/2020). Social History:  reports that she quit smoking about 5 years ago. Her smoking use included cigarettes. She has a 15.00 pack-year smoking history. She has never used smokeless tobacco. She reports current alcohol use of about 2.0 standard drinks of alcohol per week. She reports that she does not use drugs. Family History: family history includes Breast Cancer in her paternal aunt; Cancer in her father; Hypertension in her mother; Kidney Disease in her mother; Ovarian Cancer in her paternal aunt. The patients home medications have been reviewed. Allergies: Patient has no known allergies.     -------------------------------------------------- RESULTS -------------------------------------------------  Labs:  Results for orders placed or performed during the hospital encounter of 04/18/22   CBC with Auto Differential   Result Value Ref Range    WBC 5.4 4.5 - 11.5 E9/L    RBC 4.17 3.50 - 5.50 E12/L    Hemoglobin 13.1 11.5 - 15.5 g/dL    Hematocrit 40.6 34.0 - 48.0 %    MCV 97.4 80.0 - 99.9 fL    MCH 31.4 26.0 - 35.0 pg    MCHC 32.3 32.0 - 34.5 %    RDW 13.0 11.5 - 15.0 fL    Platelets 778 161 - 515 E9/L    MPV 9.6 7.0 - 12.0 fL    Neutrophils % 58.2 43.0 - 80.0 %    Immature Granulocytes % 0.2 0.0 - 5.0 %    Lymphocytes % 28.7 20.0 - 42.0 %    Monocytes % 9.4 2.0 - 12.0 %    Eosinophils % 2.6 0.0 - 6.0 %    Basophils % 0.9 0.0 - 2.0 %    Neutrophils Absolute 3.14 1.80 - 7.30 E9/L    Immature Granulocytes # 0.01 E9/L    Lymphocytes Axis 67 degrees    T Axis 73 degrees       Radiology:  CT ABDOMEN PELVIS W IV CONTRAST Additional Contrast? None   Final Result   1. No acute process in abdomen or pelvis. 2.  Hepatic steatosis. XR CHEST PORTABLE   Final Result   No acute process. ------------------------- NURSING NOTES AND VITALS REVIEWED ---------------------------  Date / Time Roomed:  4/18/2022 10:01 AM  ED Bed Assignment:  13/13    The nursing notes within the ED encounter and vital signs as below have been reviewed. /75   Pulse 83   Temp 97.2 °F (36.2 °C) (Infrared)   Resp 18   Ht 5' 4\" (1.626 m)   Wt 133 lb (60.3 kg)   SpO2 96%   BMI 22.83 kg/m²   Oxygen Saturation Interpretation: Normal      ------------------------------------------ PROGRESS NOTES ------------------------------------------  12:25 PM EDT  I have spoken with the patient and discussed todays results, in addition to providing specific details for the plan of care and counseling regarding the diagnosis and prognosis. Their questions are answered at this time and they are agreeable with the plan. I discussed at length with them reasons for immediate return here for re evaluation. They will followup with their primary care physician by calling their office on Monday.      --------------------------------- ADDITIONAL PROVIDER NOTES ---------------------------------  At this time the patient is without objective evidence of an acute process requiring hospitalization or inpatient management. They have remained hemodynamically stable throughout their entire ED visit and are stable for discharge with outpatient follow-up. The plan has been discussed in detail and they are aware of the specific conditions for emergent return, as well as the importance of follow-up.       Discharge Medication List as of 4/18/2022  3:12 PM      START taking these medications    Details   naproxen (NAPROSYN) 500 MG tablet Take 1 tablet by mouth 2 times daily for 7 days, Disp-14 tablet, R-0Normal             Diagnosis:  1. Periumbilical abdominal pain        Disposition:  Patient's disposition: Discharge to home  Patient's condition is stable. Patient was seen and evaluated by myself and my attending No att. providers found. Assessment and Plan discussed with attending provider, please see attestation for final plan of care.      Sanjay Dominguez DO \       Jose Alejandro Pink,   Resident  04/21/22 5311

## 2022-04-19 ENCOUNTER — OFFICE VISIT (OUTPATIENT)
Dept: PRIMARY CARE CLINIC | Age: 62
End: 2022-04-19
Payer: COMMERCIAL

## 2022-04-19 ENCOUNTER — TELEPHONE (OUTPATIENT)
Dept: PRIMARY CARE CLINIC | Age: 62
End: 2022-04-19

## 2022-04-19 VITALS
OXYGEN SATURATION: 96 % | TEMPERATURE: 97.4 F | DIASTOLIC BLOOD PRESSURE: 78 MMHG | WEIGHT: 129.8 LBS | SYSTOLIC BLOOD PRESSURE: 114 MMHG | HEART RATE: 100 BPM | HEIGHT: 64 IN | BODY MASS INDEX: 22.16 KG/M2

## 2022-04-19 DIAGNOSIS — R10.33 PERIUMBILICAL PAIN: Primary | ICD-10-CM

## 2022-04-19 PROCEDURE — 99213 OFFICE O/P EST LOW 20 MIN: CPT | Performed by: FAMILY MEDICINE

## 2022-04-19 NOTE — PROGRESS NOTES
Subjective:  63 y.o. female who presents to the office today with chief complaint:  Chief Complaint   Patient presents with   [facility]     ER follow up      ER f/u: seen for periumbilical pain. CT was unremarkable- no hernia noted. Given naproxen. States that her pain is an 8/10. Follows with Dr. Rusty Blair tomorrow. No longer in pain management. States the pain in her abdomen will radiate into her back and that will also cause her to have shortness of breath. Patient has not taken her migraine medications from neurology due to concern that it will worsen her breathing. Patient is overall concerned about returning to work. Health Maintenance Due   Topic Date Due    Pneumococcal 0-64 years Vaccine (2 - PCV) 10/22/2019    Cervical cancer screen  05/14/2021    Shingles Vaccine (2 of 2) 02/25/2022       Cancer History:  Family Cancer History:    Review of Systems    Review of Systems: All bolded are positive, all others are negative. General:  Fever, chills, diaphoresis, fatigue, malaise, night sweats, weight loss  Psychological:  Anxiety, disorientation, hallucinations. ENT:  Epistaxis, headaches, vertigo, visual changes. Cardiovascular:  Chest pain, irregular heartbeats, palpitations, paroxysmal nocturnal dyspnea. Respiratory:  Shortness of breath, coughing, sputum production, hemoptysis, wheezing, orthopnea.   Gastrointestinal:  Nausea, vomiting, diarrhea, heartburn, constipation, abdominal pain, hematemesis, hematochezia, melena, acholic stools  Genito-Urinary:  Dysuria, urgency, frequency, hematuria  Musculoskeletal:  Joint pain, joint stiffness, joint swelling, muscle pain  Neurology:  Headache, focal neurological deficits, weakness, numbness, paresthesia  Derm:  Rashes, ulcers, excoriations, bruising  Extremities:  Decreased ROM, peripheral edema, mottling      Objective:  Vitals:    04/19/22 1352   BP: 114/78   Pulse: 100   Temp: 97.4 °F (36.3 °C)   SpO2: 96%     Physical Exam  Constitutional: General: She is not in acute distress. Appearance: She is well-developed. She is not diaphoretic. HENT:      Head: Normocephalic and atraumatic. Right Ear: External ear normal.      Left Ear: External ear normal.      Nose: Nose normal.      Mouth/Throat:      Pharynx: No oropharyngeal exudate. Eyes:      General:         Right eye: No discharge. Left eye: No discharge. Conjunctiva/sclera: Conjunctivae normal.      Pupils: Pupils are equal, round, and reactive to light. Cardiovascular:      Rate and Rhythm: Normal rate and regular rhythm. Heart sounds: Normal heart sounds. No murmur heard. No friction rub. No gallop. Pulmonary:      Effort: Pulmonary effort is normal. No respiratory distress. Breath sounds: Normal breath sounds. No wheezing or rales. Abdominal:      General: Bowel sounds are normal. There is no distension. Palpations: Abdomen is soft. Tenderness: There is no abdominal tenderness. There is no guarding or rebound. Musculoskeletal:      Cervical back: Normal range of motion and neck supple. Lymphadenopathy:      Cervical: No cervical adenopathy. Skin:     Comments: Small areas of bruising consistent with fingerprints on right upper aspect of umbilicus. Neurological:      Mental Status: She is alert and oriented to person, place, and time. Psychiatric:         Behavior: Behavior normal.         Thought Content:  Thought content normal.         Judgment: Judgment normal.           Results for orders placed or performed during the hospital encounter of 04/18/22   CBC with Auto Differential   Result Value Ref Range    WBC 5.4 4.5 - 11.5 E9/L    RBC 4.17 3.50 - 5.50 E12/L    Hemoglobin 13.1 11.5 - 15.5 g/dL    Hematocrit 40.6 34.0 - 48.0 %    MCV 97.4 80.0 - 99.9 fL    MCH 31.4 26.0 - 35.0 pg    MCHC 32.3 32.0 - 34.5 %    RDW 13.0 11.5 - 15.0 fL    Platelets 695 542 - 683 E9/L    MPV 9.6 7.0 - 12.0 fL    Neutrophils % 58.2 43.0 - 80.0 % Immature Granulocytes % 0.2 0.0 - 5.0 %    Lymphocytes % 28.7 20.0 - 42.0 %    Monocytes % 9.4 2.0 - 12.0 %    Eosinophils % 2.6 0.0 - 6.0 %    Basophils % 0.9 0.0 - 2.0 %    Neutrophils Absolute 3.14 1.80 - 7.30 E9/L    Immature Granulocytes # 0.01 E9/L    Lymphocytes Absolute 1.55 1.50 - 4.00 E9/L    Monocytes Absolute 0.51 0.10 - 0.95 E9/L    Eosinophils Absolute 0.14 0.05 - 0.50 E9/L    Basophils Absolute 0.05 0.00 - 0.20 E9/L   Comprehensive Metabolic Panel w/ Reflex to MG   Result Value Ref Range    Sodium 140 132 - 146 mmol/L    Potassium reflex Magnesium 3.9 3.5 - 5.0 mmol/L    Chloride 104 98 - 107 mmol/L    CO2 24 22 - 29 mmol/L    Anion Gap 12 7 - 16 mmol/L    Glucose 113 (H) 74 - 99 mg/dL    BUN 20 6 - 23 mg/dL    CREATININE 0.9 0.5 - 1.0 mg/dL    GFR Non-African American >60 >=60 mL/min/1.73    GFR African American >60     Calcium 9.4 8.6 - 10.2 mg/dL    Total Protein 7.2 6.4 - 8.3 g/dL    Albumin 4.5 3.5 - 5.2 g/dL    Total Bilirubin 0.3 0.0 - 1.2 mg/dL    Alkaline Phosphatase 107 (H) 35 - 104 U/L    ALT 48 (H) 0 - 32 U/L    AST 34 (H) 0 - 31 U/L   Lipase   Result Value Ref Range    Lipase 49 13 - 60 U/L   Troponin   Result Value Ref Range    Troponin, High Sensitivity <6 0 - 9 ng/L   Brain Natriuretic Peptide   Result Value Ref Range    Pro-BNP 90 0 - 125 pg/mL   Urinalysis with Microscopic   Result Value Ref Range    Color, UA Yellow Straw/Yellow    Clarity, UA Clear Clear    Glucose, Ur Negative Negative mg/dL    Bilirubin Urine Negative Negative    Ketones, Urine Negative Negative mg/dL    Specific Gravity, UA <=1.005 1.005 - 1.030    Blood, Urine Negative Negative    pH, UA 5.0 5.0 - 9.0    Protein, UA Negative Negative mg/dL    Urobilinogen, Urine 0.2 <2.0 E.U./dL    Nitrite, Urine Negative Negative    Leukocyte Esterase, Urine Negative Negative    WBC, UA NONE 0 - 5 /HPF    RBC, UA NONE 0 - 2 /HPF    Epithelial Cells, UA RARE /HPF    Bacteria, UA RARE (A) None Seen /HPF   Lactic Acid   Result Value Ref Range    Lactic Acid 1.0 0.5 - 2.2 mmol/L   EKG 12 Lead   Result Value Ref Range    Ventricular Rate 74 BPM    Atrial Rate 74 BPM    P-R Interval 124 ms    QRS Duration 74 ms    Q-T Interval 372 ms    QTc Calculation (Bazett) 412 ms    P Axis 67 degrees    R Axis 67 degrees    T Axis 73 degrees         Assessment and Plan:  Tyra Olivera was seen today for check-up. Diagnoses and all orders for this visit:    Periumbilical pain  -     External Referral To Gastroenterology        1. Periumbilical pain: Referral to gastroenterology. Work-up in emergency department was unremarkable. Issues with breathing are not likely due to her abdominal pain. Patient will discuss her current headache medication with her neurologist, as she has not been compliant with it due to fear of worsening her breathing. No follow-ups on file. Patient may come in sooner if needed for medical concerns. Patient advised to call at any time to cancel, re-schedule, or for any questions/concerns.             Roxy Robledo,     4/19/22  12:21 PM

## 2022-04-19 NOTE — TELEPHONE ENCOUNTER
Pt called into the office, did schedule ED follow up for Monday ( was in ED yesterday)_ . Pt has concerns about her return to work date however, she states the ER told her she has a torn muscle in her abdomin, and not to go to go to wrk today, pt wants to know if she comes in and you are agreeable would you also be able to write her a work excuse for missing days from when she left the hospital to when she comes in office .

## 2022-04-20 ENCOUNTER — TELEPHONE (OUTPATIENT)
Dept: PRIMARY CARE CLINIC | Age: 62
End: 2022-04-20

## 2022-04-20 ENCOUNTER — OFFICE VISIT (OUTPATIENT)
Dept: PHYSICAL MEDICINE AND REHAB | Age: 62
End: 2022-04-20
Payer: COMMERCIAL

## 2022-04-20 VITALS — BODY MASS INDEX: 22.02 KG/M2 | HEIGHT: 64 IN | WEIGHT: 129 LBS

## 2022-04-20 DIAGNOSIS — R20.2 PARESTHESIA: ICD-10-CM

## 2022-04-20 DIAGNOSIS — G56.03 BILATERAL CARPAL TUNNEL SYNDROME: ICD-10-CM

## 2022-04-20 DIAGNOSIS — G56.03 BILATERAL CARPAL TUNNEL SYNDROME: Primary | ICD-10-CM

## 2022-04-20 DIAGNOSIS — D75.9 BONE MARROW DISEASE: ICD-10-CM

## 2022-04-20 LAB
BUN BLDV-MCNC: 14 MG/DL (ref 6–23)
CREAT SERPL-MCNC: 0.8 MG/DL (ref 0.5–1)
GFR AFRICAN AMERICAN: >60
GFR NON-AFRICAN AMERICAN: >60 ML/MIN/1.73

## 2022-04-20 PROCEDURE — 95913 NRV CNDJ TEST 13/> STUDIES: CPT | Performed by: PHYSICAL MEDICINE & REHABILITATION

## 2022-04-20 PROCEDURE — 95886 MUSC TEST DONE W/N TEST COMP: CPT | Performed by: PHYSICAL MEDICINE & REHABILITATION

## 2022-04-20 NOTE — TELEPHONE ENCOUNTER
Phone call from patient following up. Patient states Dr. Adenike Cortes to contact Dr. Shayy Francois regarding her belly button? Patient had an appointment with Dr. Shayy Francois today and said nothing had been discussed yet. Patient asking what she is to do as the hospital has her returning to work tomorrow. Patient asking for a call back today.       [phone]

## 2022-04-20 NOTE — LETTER
[facility] [id number]  Phone: [phone]  Fax: [phone]    Santa Cummings DO        April 20, 2022      To Whom It May Concern,    Isidoro So was seen in my office today 4-20-22. Please excuse the absence. Sincerely,      Dalia Weber D.O., P.T.   Board Certified Physical Medicine and Rehabilitation  Board Certified [facility] Drake Jaquez DO

## 2022-04-20 NOTE — PROGRESS NOTES
[address] [facility]  Electrodiagnostic Laboratory  *Accredited by the [address] [place] [address] with exemplary status  [address] [facility]  Phone: [phone]  Fax: [phone]    Referring Provider: Noelle Bell DO  Primary Care Physician: Beau Epley, DO  Patient Name: Adolfo Rosales  Patient YOB: 1960  Gender: female  BMI: Body mass index is 22.14 kg/m². Height 5' 4\" (1.626 m), weight 129 lb (58.5 kg), not currently breastfeeding. 4/20/2022    Reason for Referral: bilateral carpal tunnel    Description of clinical problem:   Chief Complaint   Patient presents with    Extremity Pain     right worse than left. Pain described throbbing. Pain rated 8/10. Symptoms for 3 months with no acute injury.  Numbness     finger numbness bilaterally.  Extremity Weakness     weakened  strength bilaterlly. Sensory NCS      Nerve / Sites Rec. Site Peak Lat PP Amp Segments Distance Velocity Temp. ms µV  cm m/s °C   R Median - Digit II (Antidromic)      Palm Dig II 1.98 31.0 Palm - Dig II 7 54 34      Wrist Dig II 3.65 30.1 Wrist - Dig II 14 48 34   R Ulnar - Digit V (Antidromic)      Wrist Dig V 3.80 26.6 Wrist - Dig V 14 46 33.9   R Radial - Anatomical snuff box (Forearm)      Forearm Wrist 2.60 14.7 Forearm - Wrist 10 49 34.3       Combined Sensory Index      Nerve / Sites Rec. Site Peak Lat NP Amp PP Amp Segments Dist. Peak Diff Temp.      ms µV µV  cm ms °C   R Median - CSI      Median Thumb 3.44 24.0 46.8 Median - Radial 10 0.36 34      Radial Thumb 3.07 14.9 23.0 Median - Ulnar 14 0.36 34      Median Ring 4.11 14.2 31.3 Median palm - Ulnar palm 8 0.31 34      Ulnar Ring 3.75 5.1 15.4          Median palm Wrist 2.50 46.7 43.5          Ulnar palm Wrist 2.19 14.8 39.6          CSI     CSI  1.04*    L Median - CSI      Median Thumb 3.23 9.2 15.3 Median - Radial 10 0.63 33.9      Radial Thumb 2.60 13.1 20.5 Median - Ulnar 14 0.78 33.5      Median Ring 4.06 11.4 18.3 Median palm - Ulnar palm 8        Ulnar Ring 3.28 7.3 10.1          CSI     CSI  1.41*        Motor NCS      Nerve / Sites Muscle Onset Amplitude Segments Distance Velocity Temp.     ms mV  cm m/s °C   R Median - APB      Palm APB 1.35 11.9 Palm - APB   34      Wrist APB 4.11 8.4 Wrist - Palm 8 29* 34.1      Elbow APB 7.97 8.1 Elbow - Wrist 18 47 34.1   L Median - APB      Palm APB 2.24 9.6 Palm - APB   34.1      Wrist APB 3.54 9.4 Wrist - Palm 8 61 34.1      Elbow APB 7.40 7.7 Elbow - Wrist 18 47 34.1   R Ulnar - ADM      Wrist ADM 2.66 10.8 Wrist - ADM 8  34.3      B. Elbow ADM 5.94 8.5 B. Elbow - Wrist 19 58 34.1      A. Elbow ADM 8.02 7.6 A. Elbow - B. Elbow 10 48 33.8       F  Wave      Nerve Fmin % F    ms %   R Median - APB 25.26 70   R Ulnar - ADM 25.73 60   L Median - APB 24.84 60       EMG      EMG Summary Table     Spontaneous MUAP Recruitment   Muscle Nerve Roots IA Fib PSW Fasc Amp Dur. PPP Pattern   Biceps brachii Musculocutaneous C5-C6 N None None None N N N N   L. Biceps brachii Musculocutaneous C5-C6 N None None None N N N N   L. Triceps brachii Radial C6-C8 N None None None N N N N   L. Pronator teres Median C6-C7 N None None None N N N N   L. First dorsal interosseous Ulnar C8-T1 N None None None N N N N   L. Abductor pollicis brevis Median T6-H6 N None None None N N N N   R. Biceps brachii Musculocutaneous C5-C6 N None None None N N N N   R. Triceps brachii Radial C6-C8 N None None None N N N N   R. Pronator teres Median C6-C7 N None None None N N N N   R. First dorsal interosseous Ulnar C8-T1 N None None None N N N N   R. Abductor pollicis brevis Median Q7-E2 N None None None N N N N   R. Cervical paraspinals (low)  - N None None None N N N N   R. Cervical paraspinals (mid)  - N None None None N N N N   L. Cervical paraspinals (low)  - N None None None N N N N   L.  Cervical paraspinals (mid)  - N None None None N N N N          Study Limitations:  none    Summary of Findings:   Nerve conduction studies:   · The following nerve conduction studies were abnormal:   · Bilateral combined sensory index is abnormal.   · Right median motor conduction velocity across the wrist is focally slow. · All other nerve conduction studies, as listed in the table were normal in latency, amplitude and conduction velocity. Needle EMG:   · Needle EMG was performed using a concentric needle. · Observed motor units were normal in amplitude, duration, phases and recruitment and no active denervation signs were seen. Diagnostic Interpretation: This study was abnormal.     Electrodiagnosis: There is electrodiagnostic evidence of a median mononeuropathy. · Location: bilateral at the wrist.   · Nature: [  ] Axonal   [ X ] Demyelinating  [  ] Mixed axonal and demyelinating     [  ] Sensory [  ] Motor               [ X ] Mixed sensorimotor     [  ] with active denervation       [ X ] without active denervation  · Duration: Acute  · Severity: mild on left, moderate on right  · Prognosis: Good. The prognosis for recovery of demyelinating lesions is good if the cause is alleviated. Previous Study: There is not a prior study for comparison. Follow up EMG is recommended if no surgical intervention and symptoms persist in one year. Technologist: Elias Kang LPN  Physician:    Prakash Sun D.O., P.T. Board Certified Physical Medicine and Rehabilitation  Board Certified Electrodiagnostic Medicine      Nerve conduction studies and electromyography were performed according to our laboratory policies and procedures which can be provided upon request. All abnormal values are identified in the table.  Laboratory normal values can also be provided upon request.       Cc: DO Ross De Jesus DO

## 2022-04-20 NOTE — PATIENT INSTRUCTIONS
Electrodiagnotic Laboratory  [facility] the [facility] with Exemplary status  MARY Irizarry D.O. [address] [facility]  [address] [facility]  Phone: [phone]  Fax: [phone]        Today you had an electrodiagnostic exam which included nerve conduction studies (NCS) and electromyography (EMG). This test evaluated the electrical activity of your nerves and muscles to help determine if you have a nerve or muscle disease. This test can help determine the location and type of a nerve or muscle problem. This will help your referring doctor diagnose your condition and determine the appropriate next step in your treatment plan. After your test:    1. There are no long lasting side effects of the test.     2. You may resume your normal activities without restrictions. 3.  Resume any medications that were stopped for the test.     4  If you have sore areas or bruising in your muscles where the needle was placed, apply a cold pack to the sore area for 15-20 minutes three to four times a day as needed for pain. The soreness should go away in about 1-2 days. 5. Your results were provided  Briefly at the end of your test and the final detailed report will be provided to your referring physician, and/or primary care physician and any other parties you requested within 1-2 days of the examination. You may wish to contact your referring provider after a few days to determine what they would like you to do next. 6.  Please call [phone] with any questions or concerns and if you develop increased body temperature/fever, swelling, tenderness, increased pain and/or drainage from the sites where the needle was placed. Thank you for choosing us for your health care needs. Patient Education        Carpal Tunnel Syndrome: Exercises  Introduction  Here are some examples of exercises for you to try.  The exercises may be suggested for a condition or for rehabilitation. Start each exercise slowly. Ease off the exercises if you start to have pain. You will be told when to start these exercises and which ones will work bestfor you. Warm-up stretches  When you no longer have pain or numbness, you can do exercises to help prevent carpal tunnel syndrome from coming back. Do not do any stretch or movement thatis uncomfortable or painful. 1. Rotate your wrist up, down, and from side to side. Repeat 4 times. 2. Stretch your fingers far apart. Relax them, and then stretch them again. Repeat 4 times. 3. Stretch your thumb by pulling it back gently, holding it, and then releasing it. Repeat 4 times. How to do the exercises  Prayer stretch    1. Start with your palms together in front of your chest just below your chin. 2. Slowly lower your hands toward your waistline, keeping your hands close to your stomach and your palms together until you feel a mild to moderate stretch under your forearms. 3. Hold for at least 15 to 30 seconds. Repeat 2 to 4 times. Wrist flexor stretch    1. Extend your arm in front of you with your palm up. 2. Bend your wrist, pointing your hand toward the floor. 3. With your other hand, gently bend your wrist farther until you feel a mild to moderate stretch in your forearm. 4. Hold for at least 15 to 30 seconds. Repeat 2 to 4 times. Wrist extensor stretch    1. Repeat steps 1 through 4 of the stretch above, but begin with your extended hand palm down. Follow-up care is a key part of your treatment and safety. Be sure to make and go to all appointments, and call your doctor if you are having problems. It's also a good idea to know your test results and keep alist of the medicines you take. Where can you learn more? Go to [email or web address] org and sign in to your [employer] account. Enter F654 in the [employer] box to learn more about \"Carpal Tunnel Syndrome: Exercises. \"     If you do not have an account, please click on the \"Sign Up Now\" link. Current as of: July 1, 2021               Content Version: 13.2  © 2006-2022 Healthwise, Incorporated. Care instructions adapted under license by [facility] ([facility]). If you have questions about a medical condition or this instruction, always ask your healthcare professional. [address] any warranty or liability for your use of this information.

## 2022-04-21 ENCOUNTER — TELEPHONE (OUTPATIENT)
Dept: NEUROLOGY | Age: 62
End: 2022-04-21

## 2022-04-21 LAB
ALBUMIN SERPL-MCNC: 3.8 G/DL (ref 3.5–4.7)
ALPHA-1-GLOBULIN: 0.2 G/DL (ref 0.2–0.4)
ALPHA-2-GLOBULIN: 0.9 G/DL (ref 0.5–1)
BETA GLOBULIN: 1.1 G/DL (ref 0.8–1.3)
ELECTROPHORESIS: NORMAL
GAMMA GLOBULIN: 0.9 G/DL (ref 0.7–1.6)
TOTAL PROTEIN: 6.8 G/DL (ref 6.4–8.3)

## 2022-04-21 RX ORDER — TOPIRAMATE 25 MG/1
25 TABLET ORAL NIGHTLY
Qty: 30 TABLET | Refills: 1 | Status: SHIPPED
Start: 2022-04-21 | End: 2022-06-21

## 2022-04-21 ASSESSMENT — ENCOUNTER SYMPTOMS
SORE THROAT: 0
VOMITING: 0
BACK PAIN: 0
EYE PAIN: 0
ABDOMINAL PAIN: 1
NAUSEA: 0
WHEEZING: 0
COUGH: 0
ABDOMINAL DISTENTION: 0
SHORTNESS OF BREATH: 1
EYE REDNESS: 0
EYE DISCHARGE: 0
SINUS PRESSURE: 0
DIARRHEA: 0

## 2022-04-21 NOTE — TELEPHONE ENCOUNTER
Patient called stating that the medciation Alis prescribed Inderal LA 60mg daily interferes with her breathing. Pt would have to use her inhaler. Pt wishes Samira Barlow to order another medication.  Please advise

## 2022-04-22 ENCOUNTER — TELEPHONE (OUTPATIENT)
Dept: PHYSICAL MEDICINE AND REHAB | Age: 62
End: 2022-04-22

## 2022-04-25 ENCOUNTER — TELEPHONE (OUTPATIENT)
Dept: PRIMARY CARE CLINIC | Age: 62
End: 2022-04-25

## 2022-04-25 NOTE — TELEPHONE ENCOUNTER
Barbie Lester called and stated that you have her Returning to work on 4/28/2022. She doesn't think she will be able to go back to work because she is still having periumbilical pain and doesn't see Dr Phoenix Mayers until May 20th. Please advise if you can extend her.

## 2022-04-26 DIAGNOSIS — R10.31 RIGHT LOWER QUADRANT ABDOMINAL PAIN: Primary | ICD-10-CM

## 2022-04-26 NOTE — TELEPHONE ENCOUNTER
Spoke with patient and notified her that Cherelle PACHECO discontinued her Inderal and ordered Topamax which was sent to [facility].

## 2022-04-26 NOTE — PROGRESS NOTES
Phone call held with patient today to discuss her ongoing abdominal pain that radiates into her back. She requests another week off, as her pain is not improved. States she continues to have diarrhea and poor p.o. intake. Discussion held with patient that she should have FMLA or short-term disability papers to fill out. She states that she is sending short-term disability papers to me to complete.   She agreed to a referral to Dr. Kerwin Moreno with general surgery due to symptoms of hernia in an area where she had a hernia repair in the past.    Pushpa An,   4/26/22  3:55 PM

## 2022-04-28 ENCOUNTER — TELEPHONE (OUTPATIENT)
Dept: PHYSICAL MEDICINE AND REHAB | Age: 62
End: 2022-04-28

## 2022-04-28 ENCOUNTER — OFFICE VISIT (OUTPATIENT)
Dept: PHYSICAL MEDICINE AND REHAB | Age: 62
End: 2022-04-28
Payer: COMMERCIAL

## 2022-04-28 VITALS
HEIGHT: 64 IN | SYSTOLIC BLOOD PRESSURE: 118 MMHG | BODY MASS INDEX: 22.36 KG/M2 | HEART RATE: 91 BPM | WEIGHT: 131 LBS | DIASTOLIC BLOOD PRESSURE: 81 MMHG

## 2022-04-28 DIAGNOSIS — M54.59 MECHANICAL LOW BACK PAIN: Primary | ICD-10-CM

## 2022-04-28 DIAGNOSIS — M25.461 SWELLING OF JOINT OF RIGHT KNEE: ICD-10-CM

## 2022-04-28 DIAGNOSIS — M47.816 LUMBAR SPONDYLOSIS: ICD-10-CM

## 2022-04-28 PROCEDURE — 99214 OFFICE O/P EST MOD 30 MIN: CPT | Performed by: PHYSICAL MEDICINE & REHABILITATION

## 2022-04-28 RX ORDER — CELECOXIB 50 MG/1
50 CAPSULE ORAL 2 TIMES DAILY
Qty: 60 CAPSULE | Refills: 3 | Status: SHIPPED | OUTPATIENT
Start: 2022-04-28

## 2022-04-28 NOTE — PROGRESS NOTES
Ed Dai D.O. [facility]  [address] [facility]  Phone: [phone]  Fax: [phone]        4/28/22    Chief Complaint   Patient presents with    Back Pain     follow up MRI results       HPI:  Nikolas Corea is a 63y.o. year old woman seen today in follow up regarding low back pain. Interval history: Since the last visit the patient had lumbar MRI. Her symptoms are unchanged. Today, the pain is rated Pain Score:   8 where 0 is no pain and 10 is pain as bad as it can be. The pain is located in the low back,  radiates distally to the right leg, and is described as aching and into the abdomen. This pain occurs all day. The symptoms have been unchanged since onset. Symptoms are exacerbated by nothing in particular. Factors which relieve the pain include nothing. Other associated symptoms include paresthesias right leg. Otherwise, the pain assessment has not changed since the last visit. As a part of today's visit, I have reviewed the following medical records:   Lab Results   Component Value Date     04/18/2022    K 3.9 04/18/2022     04/18/2022    CO2 24 04/18/2022    BUN 14 04/20/2022    CREATININE 0.8 04/20/2022    GLUCOSE 113 (H) 04/18/2022    CALCIUM 9.4 04/18/2022    PROT 6.8 04/20/2022    LABALBU 3.8 04/20/2022    BILITOT 0.3 04/18/2022    ALKPHOS 107 (H) 04/18/2022    AST 34 (H) 04/18/2022    ALT 48 (H) 04/18/2022    LABGLOM >60 04/20/2022    GFRAA >60 04/20/2022   I have personally interpreted the following tests: MRI lumbar spine showed mild, age appropriate degenerative changes without nerve root compression or stenosis. An independent historian was not needed to obtain history.       Past Medical History:   Diagnosis Date    Asthma     controlled with inhalers     BRCA1 negative     Patient thinks she had the genetic testing but don't know the results     Chronic pain     Chronic rhinitis 2011    CRPS (complex regional pain syndrome), lower limb     left foot    Hyperlipidemia     Raynauds syndrome     Right foot pain     for OR 20     RSD lower limb     left foot    Tinnitus        Past Surgical History:   Procedure Laterality Date    BREAST SURGERY  2009    lump left breast    BUNIONECTOMY  2011    left     SECTION  ,     ENDOMETRIAL ABLATION  2009    ESOPHAGUS SURGERY      due to gerd    FOOT SURGERY Right 2020    TARSAL TUNNEL RELEASE RIGHT FOOT performed by Mariaa Ng DPM at [address]  12    paravertebral sympathetic left side #1    NERVE BLOCK  12    paravert sympathetic left    NERVE BLOCK  2012    left paravertebral sympathetic #3    NERVE BLOCK  10-01-12    left paravertebral sympathetic  #4    NERVE BLOCK  10-10-12    paravertebral sympathetic left #5    NERVE BLOCK  10/24/12    left sympathetic    NERVE BLOCK  12    paravert sympathetic block left #7    NERVE BLOCK  12    left paravertebral sympathetic left #8    NERVE BLOCK Left 14    lumbar sympathetic #1    NERVE BLOCK  14    paravertebral sympathetic left #2    NERVE BLOCK Left 14    PARAVERTEBRAL SUMPATHETIV BLOCK #3    NERVE BLOCK Left 2014    left paravertebral sympathetic #5    NERVE BLOCK Left 14    left paravertebral sympathetic nerve block #6 14    NERVE BLOCK Left 10 8 14    paravert sympathetic #7    NERVE BLOCK N/A 2016    sympathetic #1    NERVE BLOCK  2016    right parasympathic nerve block lumbar #2    NERVE BLOCK Right 2016    paravertebral sympathetic right #3    NERVE BLOCK Right 08/10/2016    lumbar parasympathetic block #4    NERVE BLOCK Right 2016    paravertebral sympathetic right #5    NERVE BLOCK Right 2016    paravertebral sympathetic right #6    OTHER SURGICAL HISTORY  2017    laparscopic incisional hernia repair with mesh    TUBAL LIGATION         Social History     Tobacco Use    Smoking status: Former Smoker     Packs/day: 0.50     Years: 30.00     Pack years: 15.00     Types: Cigarettes     Quit date: 2017     Years since quittin.2    Smokeless tobacco: Never Used   Vaping Use    Vaping Use: Never used   Substance Use Topics    Alcohol use:  Yes     Alcohol/week: 2.0 standard drinks     Types: 2 Cans of beer per week     Comment: occasional    Drug use: No       Family History   Problem Relation Age of Onset    Cancer Father         colon    Hypertension Mother     Kidney Disease Mother     Breast Cancer Paternal Aunt     Ovarian Cancer Paternal Aunt        Current Outpatient Medications   Medication Sig Dispense Refill    celecoxib (CELEBREX) 50 MG capsule Take 1 capsule by mouth 2 times daily 60 capsule 3    topiramate (TOPAMAX) 25 MG tablet Take 1 tablet by mouth nightly 30 tablet 1    budesonide-formoterol (SYMBICORT) 160-4.5 MCG/ACT AERO INHALE TWO PUFFS BY MOUTH TWO TIMES A DAY  RINSE MOUTH AFTER USE      MOVANTIK 25 MG TABS tablet TAKE ONE TABLET BY MOUTH EVERY MORNING 90 tablet 0    pantoprazole (PROTONIX) 40 MG tablet TAKE ONE TABLET BY MOUTH EVERY DAY 90 tablet 0    levothyroxine (SYNTHROID) 50 MCG tablet TAKE ONE TABLET BY MOUTH EVERY DAY 90 tablet 1    levalbuterol (XOPENEX HFA) 45 MCG/ACT inhaler INHALE ONE PUFF BY MOUTH EVERY 4 HOURS AS NEEDED FOR WHEEZING 15 g 2    Folinic Acid-Vit B6-Vit B12 (FOLINIC-PLUS) 4-50-2 MG TABS TAKE ONE TABLET BY MOUTH EVERY DAY 90 tablet 0    DULoxetine (CYMBALTA) 20 MG extended release capsule TAKE ONE CAPSULE BY MOUTH DAILY 90 capsule 0    Calcium Carb-Cholecalciferol (CALCIUM 1000 + D) 1000-800 MG-UNIT TABS Take 1 tablet by mouth daily 90 tablet 1    atorvastatin (LIPITOR) 40 MG tablet Take 0.5 tablets by mouth daily (Patient taking differently: Take 40 mg by mouth daily ) 90 tablet 1    albuterol sulfate HFA (VENTOLIN HFA) 108 (90 Base) MCG/ACT inhaler Inhale 2 puffs into the lungs every 6 hours as needed for Wheezing 18 g 2    albuterol (PROVENTIL) (2.5 MG/3ML) 0.083% nebulizer solution INHALE 1/2 VIAL VIA NEBULIZER FOUR TIMES A DAY AS NEEDED FOR COUGH 120 each 0    azelastine (ASTELIN) 0.1 % nasal spray USE 2 SPRAYS IN EACH NOSTRIL TWICE DAILY AS DIRECTED 30 mL 2    NIFEdipine (PROCARDIA) 10 MG capsule TAKE ONE CAPSULE BY MOUTH TWO TIMES A DAY  5    SPIRIVA RESPIMAT 2.5 MCG/ACT AERS inhaler INHALE TWO PUFFS BY MOUTH once EVERY DAY  5    vitamin B-12 (CYANOCOBALAMIN) 100 MCG tablet Take 50 mcg by mouth daily.  vitamin E 400 UNIT capsule Take 400 Units by mouth daily. No current facility-administered medications for this visit. No Known Allergies    Review of Systems:  No new weakness, paresthesia, incontinence of bowel or bladder, saddle anesthesia, falls or gait dysfunction. Otherwise, per HPI. Physical Exam:   Blood pressure 118/81, pulse 91, height 5' 4\" (1.626 m), weight 131 lb (59.4 kg), not currently breastfeeding. BP Readings from Last 3 Encounters:   04/28/22 118/81   04/19/22 114/78   04/18/22 138/75     BMI Readings from Last 3 Encounters:   04/28/22 22.49 kg/m²   04/20/22 22.14 kg/m²   04/19/22 22.28 kg/m²     Wt Readings from Last 3 Encounters:   04/28/22 131 lb (59.4 kg)   04/20/22 129 lb (58.5 kg)   04/19/22 129 lb 12.8 oz (58.9 kg)       GENERAL: The patient is in no apparent distress. Body habitus is non-obese. HEENT: No rhinorrhea, sneezing, yawning, or lacrimation. No scleral icterus or conjunctival injection. SKIN: No piloerection. No tract marks. No rash. PSYCH: Mood and affect are appropriate. Hygiene is appropriate. CARDIOVASCULAR  Heart is regular rate and rhythm. There is no edema. RESPIRATORY: Respirations are regular and unlabored. There is no cyanosis. GASTROINTESTINAL: Soft abdomen, non-tender.   MSK: There is no joint effusion, deformity, instability, swelling, erythema or warmth. AROM is full in the spine and extremities. Spinal curvatures are normal.  Fullness posteriorly in the popliteal fossa bilaterally. Negative SLR. NEURO: Gait is normal. Diminished light touch right lower leg, weakness right ankle in all planes 4/5, otherwise no focal sensorimotor deficit. Reflexes 2+ and symmetric in lower extremities. Impression:   1. Mechanical low back pain    2. Swelling of joint of right knee    3. Lumbar spondylosis        Plan:  I have ordered the following unique test(s):  Orders Placed This Encounter   Procedures    US EXTREMITY RIGHT NON VASC LIMITED     Standing Status:   Future     Number of Occurrences:   1     Standing Expiration Date:   4/28/2023   Shona Ojeda MD, Neurology, [place]     Referral Priority:   Routine     Referral Type:   Eval and Treat     Referred to Provider:   Warren Hernandez MD     Requested Specialty:   Neurology     Number of Visits Requested:   1    EMG     Order Specific Question:   Which body part? Answer:   bilateral lower extremities regarding paresthesias. This has included the decision for minor procedure; EMG Procedure risk factors were discussed. Continue home exercise program.     Prescription drug management has included:     Orders Placed This Encounter   Medications    celecoxib (CELEBREX) 50 MG capsule     Sig: Take 1 capsule by mouth 2 times daily     Dispense:  60 capsule     Refill:  3      Medications Discontinued During This Encounter   Medication Reason    naproxen (NAPROSYN) 500 MG tablet Alternate therapy     Medications prescribed do require monitoring for toxicity including: CMP    Diagnosis and treatment were not significantly impacted by social determinants of health.  The patient was educated about the diagnosis, prognosis, indications, risks and benefits of treatment.  An opportunity to ask questions was given to the patient and questions were answered. The patient agreed to proceed with the recommended treatment as described above. Return if symptoms worsen or fail to improve. Thank you for allowing me to participate in the care of your patient. Edgardo Abdi D.O., P.T.   Board Certified Physical Medicine and Rehabilitation  Board Certified Electrodiagnostic Medicine

## 2022-04-28 NOTE — TELEPHONE ENCOUNTER
----- Message from Trish Scheuermann, DO sent at 4/28/2022  3:35 PM EDT -----  Test results are normal please notify patient.

## 2022-04-29 ENCOUNTER — TELEPHONE (OUTPATIENT)
Dept: PHYSICAL MEDICINE AND REHAB | Age: 62
End: 2022-04-29

## 2022-04-29 NOTE — TELEPHONE ENCOUNTER
Called and spoke with the patient to inform her that her MRI was normal.  Patient is aware and voiced understanding.

## 2022-04-29 NOTE — TELEPHONE ENCOUNTER
----- Message from Alin Willis DO sent at 4/29/2022  1:14 PM EDT -----  Test results are normal please notify patient.

## 2022-05-02 ENCOUNTER — OFFICE VISIT (OUTPATIENT)
Dept: SURGERY | Age: 62
End: 2022-05-02
Payer: COMMERCIAL

## 2022-05-02 ENCOUNTER — TELEPHONE (OUTPATIENT)
Dept: SURGERY | Age: 62
End: 2022-05-02

## 2022-05-02 ENCOUNTER — ANESTHESIA EVENT (OUTPATIENT)
Dept: ENDOSCOPY | Age: 62
End: 2022-05-02
Payer: COMMERCIAL

## 2022-05-02 VITALS
HEIGHT: 64 IN | SYSTOLIC BLOOD PRESSURE: 142 MMHG | TEMPERATURE: 97.2 F | BODY MASS INDEX: 22.36 KG/M2 | HEART RATE: 123 BPM | DIASTOLIC BLOOD PRESSURE: 92 MMHG | WEIGHT: 131 LBS

## 2022-05-02 DIAGNOSIS — K59.00 CONSTIPATION, UNSPECIFIED CONSTIPATION TYPE: ICD-10-CM

## 2022-05-02 DIAGNOSIS — R10.11 RUQ PAIN: Primary | ICD-10-CM

## 2022-05-02 PROCEDURE — 99204 OFFICE O/P NEW MOD 45 MIN: CPT | Performed by: SURGERY

## 2022-05-02 RX ORDER — PROPRANOLOL HCL 60 MG
CAPSULE, EXTENDED RELEASE 24HR ORAL
Qty: 30 CAPSULE | Refills: 0 | OUTPATIENT
Start: 2022-05-02

## 2022-05-02 NOTE — TELEPHONE ENCOUNTER
Per the order of Dr. Chuckie Feliz, patient has been scheduled for upper endoscopy on 5/3/22. Patient provided with the procedure information during visit and verbalized understanding. Patient instructed to please contact our office with any questions. Procedure scheduled through [employer]. Dr. Chuckie Feliz to enter orders. Prior Authorization Form:      DEMOGRAPHICS:                     Patient Name:  Jackie Chowdhury  Patient :  1960            Insurance:  Payor: Natalia Nuñez / Plan: Natalia Nuñez - [place] PPO / Product Type: *No Product type* /   Insurance ID Number:    Payor/Plan Subscr  Sex Relation Sub. Ins. ID Effective Group Num   1.  [address] [facility] 1960 Female Self [id number] 20 [id number]                                    [id number]         DIAGNOSIS & PROCEDURE:                       Procedure/Operation: Upper endoscopy           CPT Code: 40355    Diagnosis:  RUQ pain    ICD10 Code: R10.11    Location:  [place] [address]    Surgeon:  Mary Jane Boyd INFORMATION:                          Date: 5/3/22    Time: TBD              Anesthesia:  MAC/TIVA                                                       Status:  Outpatient        Special Comments:  N/A         Electronically signed by Marcia Desai LPN on 2/3/1636 at 4:21 PM

## 2022-05-02 NOTE — PROGRESS NOTES
General Surgery History and Physical    Patient's Name/Date of Birth: Edwige Burleson / 1960    Date: May 2, 2022     Surgeon: Olayinka Farias M.D.    PCP: Amy Melchor DO     Chief Complaint: ruq pain    HPI:   Edwige Burleson is a 63 y.o. female who presents for evaluation of ruq pain and diarrhea for 3 weeks and went to ED and CT was normal of abdomen and labs showed only mild chronic LFTs rise.  Timing is constant, radiation to entire abdomen, alleviated by nothing and started 3 weeks ago and severity is 2-9/10      Past Medical History:   Diagnosis Date    Asthma     controlled with inhalers     BRCA1 negative     Patient thinks she had the genetic testing but don't know the results     Chronic pain     Chronic rhinitis     CRPS (complex regional pain syndrome), lower limb     left foot    Hyperlipidemia     Raynauds syndrome 2019    Right foot pain     for OR 20     RSD lower limb     left foot    Tinnitus        Past Surgical History:   Procedure Laterality Date    BREAST SURGERY  2009    lump left breast    BUNIONECTOMY  2011    left     SECTION  ,     ENDOMETRIAL ABLATION      ESOPHAGUS SURGERY      due to gerd    FOOT SURGERY Right 2020    TARSAL TUNNEL RELEASE RIGHT FOOT performed by Karina Moreira DPM at [address] [facility]     [address]  12    paravertebral sympathetic left side #1    NERVE BLOCK  12    paravert sympathetic left    NERVE BLOCK  2012    left paravertebral sympathetic #3    NERVE BLOCK  10-01-12    left paravertebral sympathetic  #4    NERVE BLOCK  10-10-12    paravertebral sympathetic left #5    NERVE BLOCK  10/24/12    left sympathetic    NERVE BLOCK  12    paravert sympathetic block left #7    NERVE BLOCK  12    left paravertebral sympathetic left #8    NERVE BLOCK Left 14    lumbar sympathetic #1    NERVE BLOCK  14    paravertebral sympathetic left #2    NERVE BLOCK Left 8/6/14    PARAVERTEBRAL SUMPATHETIV BLOCK #3    NERVE BLOCK Left 9/5/2014    left paravertebral sympathetic #5    NERVE BLOCK Left 09/17/14    left paravertebral sympathetic nerve block #6 9/17/14    NERVE BLOCK Left 10 8 14    paravert sympathetic #7    NERVE BLOCK N/A 07/06/2016    sympathetic #1    NERVE BLOCK  07/13/2016    right parasympathic nerve block lumbar #2    NERVE BLOCK Right 07/20/2016    paravertebral sympathetic right #3    NERVE BLOCK Right 08/10/2016    lumbar parasympathetic block #4    NERVE BLOCK Right 08/17/2016    paravertebral sympathetic right #5    NERVE BLOCK Right 08/24/2016    paravertebral sympathetic right #6    OTHER SURGICAL HISTORY  03/27/2017    laparscopic incisional hernia repair with mesh    TUBAL LIGATION  1982       Current Outpatient Medications   Medication Sig Dispense Refill    celecoxib (CELEBREX) 50 MG capsule Take 1 capsule by mouth 2 times daily 60 capsule 3    topiramate (TOPAMAX) 25 MG tablet Take 1 tablet by mouth nightly 30 tablet 1    budesonide-formoterol (SYMBICORT) 160-4.5 MCG/ACT AERO INHALE TWO PUFFS BY MOUTH TWO TIMES A DAY  RINSE MOUTH AFTER USE      MOVANTIK 25 MG TABS tablet TAKE ONE TABLET BY MOUTH EVERY MORNING 90 tablet 0    pantoprazole (PROTONIX) 40 MG tablet TAKE ONE TABLET BY MOUTH EVERY DAY 90 tablet 0    levothyroxine (SYNTHROID) 50 MCG tablet TAKE ONE TABLET BY MOUTH EVERY DAY 90 tablet 1    levalbuterol (XOPENEX HFA) 45 MCG/ACT inhaler INHALE ONE PUFF BY MOUTH EVERY 4 HOURS AS NEEDED FOR WHEEZING 15 g 2    Folinic Acid-Vit B6-Vit B12 (FOLINIC-PLUS) 4-50-2 MG TABS TAKE ONE TABLET BY MOUTH EVERY DAY 90 tablet 0    DULoxetine (CYMBALTA) 20 MG extended release capsule TAKE ONE CAPSULE BY MOUTH DAILY 90 capsule 0    Calcium Carb-Cholecalciferol (CALCIUM 1000 + D) 1000-800 MG-UNIT TABS Take 1 tablet by mouth daily 90 tablet 1    atorvastatin (LIPITOR) 40 MG tablet Take 0.5 tablets by mouth daily (Patient taking differently: Take 40 mg by mouth daily ) 90 tablet 1    albuterol sulfate HFA (VENTOLIN HFA) 108 (90 Base) MCG/ACT inhaler Inhale 2 puffs into the lungs every 6 hours as needed for Wheezing 18 g 2    albuterol (PROVENTIL) (2.5 MG/3ML) 0.083% nebulizer solution INHALE 1/2 VIAL VIA NEBULIZER FOUR TIMES A DAY AS NEEDED FOR COUGH 120 each 0    azelastine (ASTELIN) 0.1 % nasal spray USE 2 SPRAYS IN EACH NOSTRIL TWICE DAILY AS DIRECTED 30 mL 2    NIFEdipine (PROCARDIA) 10 MG capsule TAKE ONE CAPSULE BY MOUTH TWO TIMES A DAY  5    SPIRIVA RESPIMAT 2.5 MCG/ACT AERS inhaler INHALE TWO PUFFS BY MOUTH once EVERY DAY  5    vitamin B-12 (CYANOCOBALAMIN) 100 MCG tablet Take 50 mcg by mouth daily.  vitamin E 400 UNIT capsule Take 400 Units by mouth daily. No current facility-administered medications for this visit. No Known Allergies    The patient has a family history that is negative for severe cardiovascular or respiratory issues, negative for reaction to anesthesia. 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     Employer: [employer]   Tobacco Use    Smoking status: Former Smoker     Packs/day: 0.50     Years: 30.00     Pack years: 15.00     Types: Cigarettes     Quit date: 2017     Years since quittin.2    Smokeless tobacco: Never Used   Vaping Use    Vaping Use: Never used   Substance and Sexual Activity    Alcohol use:  Yes     Alcohol/week: 2.0 standard drinks     Types: 2 Cans of beer per week     Comment: occasional    Drug use: No    Sexual activity: Not on file   Other Topics Concern    Not on file   Social History Narrative    Not on file     Social Determinants of Health     Financial Resource Strain: Low Risk     Difficulty of Paying Living Expenses: Not hard at all   Food Insecurity: No Food Insecurity    Worried About [facility] in the Last Year: Never true    Ran Out of Food in the Last Year: Never true   Transportation Needs:     Lack of Transportation (Medical): Not on file    Lack of Transportation (Non-Medical):  Not on file   Physical Activity:     Days of Exercise per Week: Not on file    Minutes of Exercise per Session: Not on file   Stress:     Feeling of Stress : Not on file   Social Connections:     Frequency of Communication with Friends and Family: Not on file    Frequency of Social Gatherings with Friends and Family: Not on file    Attends Bahai Services: Not on file    Active Member of [address] [place] [address] or Organizations: Not on file    Attends Club or Organization Meetings: Not on file    Marital Status: Not on file   Intimate Partner Violence:     Fear of Current or Ex-Partner: Not on file    Emotionally Abused: Not on file    Physically Abused: Not on file    Sexually Abused: Not on file   Housing Stability:     Unable to Pay for Housing in the Last Year: Not on file    Number of [place] in the Last Year: Not on file    Unstable Housing in the Last Year: Not on file           Review of Systems  Review of Systems -  General ROS: negative for - chills, fatigue or malaise  ENT ROS: negative for - hearing change, nasal congestion or nasal discharge  Allergy and Immunology ROS: negative for - hives, itchy/watery eyes or nasal congestion  Hematological and Lymphatic ROS: negative for - blood clots, blood transfusions, bruising or fatigue  Endocrine ROS: negative for - malaise/lethargy, mood swings, palpitations or polydipsia/polyuria  Respiratory ROS: negative for - sputum changes, stridor, tachypnea or wheezing  Cardiovascular ROS: negative for - irregular heartbeat, loss of consciousness, murmur or orthopnea  Gastrointestinal ROS: negative for - constipation, diarrhea, gas/bloating, heartburn or hematemesis  Genito-Urinary ROS: negative for -  genital discharge, genital ulcers or hematuria  Musculoskeletal ROS: negative for - gait disturbance, muscle pain or muscular weakness    Physical exam:   BP (!) 142/92 (Site: Left Upper Arm, Position: Sitting, Cuff Size: Medium Adult)   Pulse 123   Temp 97.2 °F (36.2 °C)   Ht 5' 4\" (1.626 m)   Wt 131 lb (59.4 kg)   BMI 22.49 kg/m²   General appearance:  NAD  Head: NCAT, PERRLA, EOMI, red conjunctiva  Neck: supple, no masses  Lungs: CTAB, equal chest rise bilateral  Heart: Reg rate  Abdomen: soft, nontender, nondistended  Skin; no lesions  Gu: no cva tenderness  Extremities: extremities normal, atraumatic, no cyanosis or edema      Radiology:  CT abdomen/pelvis: no acute process    Assessment:  63 y.o. female with ruq pain     Plan: Will do EGD and HIDA with [facility]  Discussed the risk, benefits and alternatives of surgery including wound infections, bleeding, scar  and the risks of anesthetic including MI, CVA, sudden death or reactions to anesthetic medications. The patient understands the risks and alternatives. All questions were answered to the patient's satisfaction and they freely signed the consent.     Trevor Magana MD  3:15 PM  5/2/2022

## 2022-05-03 ENCOUNTER — HOSPITAL ENCOUNTER (OUTPATIENT)
Age: 62
Setting detail: OUTPATIENT SURGERY
Discharge: HOME | End: 2022-05-03
Attending: SURGERY | Admitting: SURGERY
Payer: COMMERCIAL

## 2022-05-03 ENCOUNTER — ANESTHESIA (OUTPATIENT)
Dept: ENDOSCOPY | Age: 62
End: 2022-05-03
Payer: COMMERCIAL

## 2022-05-03 ENCOUNTER — PREP FOR PROCEDURE (OUTPATIENT)
Dept: SURGERY | Age: 62
End: 2022-05-03

## 2022-05-03 VITALS
HEART RATE: 84 BPM | OXYGEN SATURATION: 96 % | RESPIRATION RATE: 16 BRPM | WEIGHT: 126 LBS | DIASTOLIC BLOOD PRESSURE: 66 MMHG | BODY MASS INDEX: 21.51 KG/M2 | SYSTOLIC BLOOD PRESSURE: 109 MMHG | TEMPERATURE: 97.1 F | HEIGHT: 64 IN

## 2022-05-03 VITALS — DIASTOLIC BLOOD PRESSURE: 52 MMHG | OXYGEN SATURATION: 100 % | SYSTOLIC BLOOD PRESSURE: 109 MMHG

## 2022-05-03 DIAGNOSIS — R89.9 ABNORMAL LABORATORY TEST RESULT: Primary | ICD-10-CM

## 2022-05-03 PROCEDURE — 7100000010 HC PHASE II RECOVERY - FIRST 15 MIN: Performed by: SURGERY

## 2022-05-03 PROCEDURE — 99024 POSTOP FOLLOW-UP VISIT: CPT | Performed by: SURGERY

## 2022-05-03 PROCEDURE — 88305 TISSUE EXAM BY PATHOLOGIST: CPT

## 2022-05-03 PROCEDURE — 2709999900 HC NON-CHARGEABLE SUPPLY: Performed by: SURGERY

## 2022-05-03 PROCEDURE — 3609012400 HC EGD TRANSORAL BIOPSY SINGLE/MULTIPLE: Performed by: SURGERY

## 2022-05-03 PROCEDURE — 6360000002 HC RX W HCPCS: Performed by: NURSE ANESTHETIST, CERTIFIED REGISTERED

## 2022-05-03 PROCEDURE — 43239 EGD BIOPSY SINGLE/MULTIPLE: CPT | Performed by: SURGERY

## 2022-05-03 PROCEDURE — 7100000011 HC PHASE II RECOVERY - ADDTL 15 MIN: Performed by: SURGERY

## 2022-05-03 PROCEDURE — 3700000001 HC ADD 15 MINUTES (ANESTHESIA): Performed by: SURGERY

## 2022-05-03 PROCEDURE — 2580000003 HC RX 258: Performed by: NURSE ANESTHETIST, CERTIFIED REGISTERED

## 2022-05-03 PROCEDURE — 3700000000 HC ANESTHESIA ATTENDED CARE: Performed by: SURGERY

## 2022-05-03 PROCEDURE — 88342 IMHCHEM/IMCYTCHM 1ST ANTB: CPT

## 2022-05-03 RX ORDER — SODIUM CHLORIDE 0.9 % (FLUSH) 0.9 %
5-40 SYRINGE (ML) INJECTION PRN
Status: DISCONTINUED | OUTPATIENT
Start: 2022-05-03 | End: 2022-05-03 | Stop reason: HOSPADM

## 2022-05-03 RX ORDER — PROPOFOL 10 MG/ML
INJECTION, EMULSION INTRAVENOUS PRN
Status: DISCONTINUED | OUTPATIENT
Start: 2022-05-03 | End: 2022-05-03 | Stop reason: SDUPTHER

## 2022-05-03 RX ORDER — SODIUM CHLORIDE, SODIUM LACTATE, POTASSIUM CHLORIDE, CALCIUM CHLORIDE 600; 310; 30; 20 MG/100ML; MG/100ML; MG/100ML; MG/100ML
INJECTION, SOLUTION INTRAVENOUS CONTINUOUS
Status: DISCONTINUED | OUTPATIENT
Start: 2022-05-03 | End: 2022-05-03 | Stop reason: HOSPADM

## 2022-05-03 RX ORDER — SODIUM CHLORIDE, SODIUM LACTATE, POTASSIUM CHLORIDE, CALCIUM CHLORIDE 600; 310; 30; 20 MG/100ML; MG/100ML; MG/100ML; MG/100ML
INJECTION, SOLUTION INTRAVENOUS CONTINUOUS PRN
Status: DISCONTINUED | OUTPATIENT
Start: 2022-05-03 | End: 2022-05-03 | Stop reason: SDUPTHER

## 2022-05-03 RX ORDER — NALOXEGOL OXALATE 25 MG/1
TABLET, FILM COATED ORAL
Qty: 90 TABLET | Refills: 0 | Status: SHIPPED
Start: 2022-05-03 | End: 2022-08-01

## 2022-05-03 RX ORDER — PANTOPRAZOLE SODIUM 40 MG/1
TABLET, DELAYED RELEASE ORAL
Qty: 90 TABLET | Refills: 0 | Status: SHIPPED
Start: 2022-05-03 | End: 2022-07-27

## 2022-05-03 RX ORDER — SODIUM CHLORIDE, SODIUM LACTATE, POTASSIUM CHLORIDE, CALCIUM CHLORIDE 600; 310; 30; 20 MG/100ML; MG/100ML; MG/100ML; MG/100ML
INJECTION, SOLUTION INTRAVENOUS CONTINUOUS
Status: CANCELLED | OUTPATIENT
Start: 2022-05-03

## 2022-05-03 RX ADMIN — SODIUM CHLORIDE, POTASSIUM CHLORIDE, SODIUM LACTATE AND CALCIUM CHLORIDE: 600; 310; 30; 20 INJECTION, SOLUTION INTRAVENOUS at 07:02

## 2022-05-03 RX ADMIN — PROPOFOL 130 MG: 10 INJECTION, EMULSION INTRAVENOUS at 07:05

## 2022-05-03 RX ADMIN — PROPOFOL 20 MG: 10 INJECTION, EMULSION INTRAVENOUS at 07:07

## 2022-05-03 ASSESSMENT — PAIN DESCRIPTION - LOCATION: LOCATION: ABDOMEN;BACK

## 2022-05-03 ASSESSMENT — PAIN DESCRIPTION - DESCRIPTORS: DESCRIPTORS: DISCOMFORT

## 2022-05-03 ASSESSMENT — PAIN SCALES - GENERAL: PAINLEVEL_OUTOF10: 8

## 2022-05-03 NOTE — OP NOTE
SURGEON: Pablo Mike M.D. PREOPERATIVE DIAGNOSES:  gerd ruq pain    POSTOPERATIVE DIAGNOSES: normal EGD     OPERATION: Vbbewpoh-kzlevi-iodwqzxhszsb with biopsy    BLOOD LOSS: 0ML    ANESTHESIA: LMAC    COMPLICATIONS: None. OPERATIONS: The patient was placed on the table in left lateral decubitus position and sedated. Bite block was placed. A lubricated scope was easily passed into the upper esophagus which looked normal. The distal esophagus looked normal. The scope was passed into the stomach and retroflexed. There was no hiatal hernia. The scope was passed down toward the pylorus. The antral mucosa all looked normal. Biopsy was taken to check for H. pylori. The scope was then passed through the pylorus into the duodenal bulb which looked normal, then around to the distal duodenum which looked normal, and the scope was then withdrawn. The GE junction was at 38 cm from the teeth. The patient tolerated the procedure well.      Physician Signature: Electronically signed by Dr. JAMILAH RAMIREZ [facility]

## 2022-05-03 NOTE — ANESTHESIA POSTPROCEDURE EVALUATION
Department of Anesthesiology  Postprocedure Note    Patient: Muriel Carlos  MRN: [id number]  YOB: 1960  Date of evaluation: 5/3/2022  Time:  7:46 AM     Procedure Summary     Date: 05/03/22 Room / Location: [address] [place] [address] 01 / [facility]    Anesthesia Start: 8005 Anesthesia Stop: 7289    Procedure: EGD BIOPSY (N/A ) Diagnosis: (EPIGASTRIC PAIN)    Surgeons: Tuan Ronquillo MD Responsible Provider: Sophie Watson MD    Anesthesia Type: MAC ASA Status: 3          Anesthesia Type: MAC    Wendy Phase I: Wendy Score: 10    Wendy Phase II:      Last vitals: Reviewed and per EMR flowsheets.        Anesthesia Post Evaluation    Patient location during evaluation: PACU  Patient participation: complete - patient participated  Level of consciousness: awake  Pain score: 0  Airway patency: patent  Nausea & Vomiting: no nausea  Complications: no  Cardiovascular status: blood pressure returned to baseline  Respiratory status: acceptable  Hydration status: euvolemic    GINA Dominguez CRNA

## 2022-05-03 NOTE — ANESTHESIA PRE PROCEDURE
Department of Anesthesiology  Preprocedure Note       Name:  Mamie Wright   Age:  63 y.o.  :  1960                                          MRN:  [id number]         Date:  5/3/2022      Surgeon: Sarah Maynard):  Buddy Harrell MD    Procedure: Procedure(s):  EGD BIOPSY    Medications prior to admission:   Prior to Admission medications    Medication Sig Start Date End Date Taking?  Authorizing Provider   celecoxib (CELEBREX) 50 MG capsule Take 1 capsule by mouth 2 times daily 22   Paul Lagunas,    topiramate (TOPAMAX) 25 MG tablet Take 1 tablet by mouth nightly 22   JUNIOR Pickard   budesonide-formoterol (SYMBICORT) 160-4.5 MCG/ACT AERO INHALE TWO PUFFS BY MOUTH TWO TIMES A DAY  RINSE MOUTH AFTER USE 3/13/22   Historical Provider, MD   MOVANTIK 25 MG TABS tablet TAKE ONE TABLET BY MOUTH EVERY MORNING 22   Robbie Robledo,    pantoprazole (PROTONIX) 40 MG tablet TAKE ONE TABLET BY MOUTH EVERY DAY 22   Primus Sergey Robledo DO   levothyroxine (SYNTHROID) 50 MCG tablet TAKE ONE TABLET BY MOUTH EVERY DAY 22   Robbie Robledo DO   levalbuterol (XOPENEX HFA) 45 MCG/ACT inhaler INHALE ONE PUFF BY MOUTH EVERY 4 HOURS AS NEEDED FOR WHEEZING 22   Robbie Robledo DO   Folinic Acid-Vit B6-Vit B12 (FOLINIC-PLUS) 4-50-2 MG TABS TAKE ONE TABLET BY MOUTH EVERY DAY 22   Primus Lean DO Venkat   DULoxetine (CYMBALTA) 20 MG extended release capsule TAKE ONE CAPSULE BY MOUTH DAILY 22   Sinai Robledo DO   Calcium Carb-Cholecalciferol (CALCIUM 1000 + D) 1000-800 MG-UNIT TABS Take 1 tablet by mouth daily 22   Sinai Robledo DO   atorvastatin (LIPITOR) 40 MG tablet Take 0.5 tablets by mouth daily  Patient taking differently: Take 40 mg by mouth daily Takes 40mg qd 22   Sinai Robledo DO   albuterol sulfate HFA (VENTOLIN HFA) 108 (90 Base) MCG/ACT inhaler Inhale 2 puffs into the lungs every 6 hours as needed for Wheezing 22 Robbie Robledo, DO   albuterol (PROVENTIL) (2.5 MG/3ML) 0.083% nebulizer solution INHALE 1/2 VIAL VIA NEBULIZER FOUR TIMES A DAY AS NEEDED FOR COUGH 5/26/21   Robbie Robledo, DO   azelastine (ASTELIN) 0.1 % nasal spray USE 2 SPRAYS IN EACH NOSTRIL TWICE DAILY AS DIRECTED 9/23/19 April Service, DO   NIFEdipine (PROCARDIA) 10 MG capsule TAKE ONE CAPSULE BY MOUTH TWO TIMES A DAY 8/28/19   Historical Provider, MD   SPIRIVA RESPIMAT 2.5 MCG/ACT AERS inhaler INHALE TWO PUFFS BY MOUTH once EVERY DAY 4/11/18   Historical Provider, MD   vitamin B-12 (CYANOCOBALAMIN) 100 MCG tablet Take 50 mcg by mouth daily. Historical Provider, MD   vitamin E 400 UNIT capsule Take 400 Units by mouth daily. Historical Provider, MD       Current medications:    No current facility-administered medications for this visit. No current outpatient medications on file. Facility-Administered Medications Ordered in Other Visits   Medication Dose Route Frequency Provider Last Rate Last Admin    lactated ringers infusion   IntraVENous Continuous Dashawn Perez MD        sodium chloride flush 0.9 % injection 5-40 mL  5-40 mL IntraVENous PRN Dashawn Perez MD        lactated ringers infusion   IntraVENous Continuous Ever Sutton MD           Allergies:  No Known Allergies    Problem List:    Patient Active Problem List   Diagnosis Code    CRPS of the left foot. G90.529    Peripheral neuropathy of the left foot. G62.9    Status post bunionectomy Z98.890    chronic neuropathic pain syndrome.  M79.2    Thyroid nodule E04.1    Unilateral hearing loss H91.90    RSD lower limb G90.529    Chronic pain syndrome G89.4    Anemia D64.9    Underweight R63.6    Dependence on nicotine from cigarettes F17.210    Acute respiratory failure with hypoxia (HCC) J96.01    COPD exacerbation (HCC) J44.1    Asthma J45.909    Elevated LFTs R79.89    Dyslipidemia E78.5    Abnormal laboratory test result R89.9       Past Medical History:        Diagnosis Date    Asthma     controlled with inhalers     BRCA1 negative     Patient thinks she had the genetic testing but don't know the results     Chronic pain     Chronic rhinitis     CRPS (complex regional pain syndrome), lower limb     left foot    Hyperlipidemia     Raynauds syndrome 2019    Right foot pain     for OR 20     RSD lower limb     left foot    Thyroid disease     Tinnitus        Past Surgical History:        Procedure Laterality Date    BREAST SURGERY  2009    lump left breast    BUNIONECTOMY      left     SECTION  ,     ENDOMETRIAL ABLATION      ESOPHAGUS SURGERY      due to gerd    FOOT SURGERY Right 2020    TARSAL TUNNEL RELEASE RIGHT FOOT performed by Queen Kenn DPM [address]  12    paravertebral sympathetic left side #1    NERVE BLOCK  12    paravert sympathetic left    NERVE BLOCK  2012    left paravertebral sympathetic #3    NERVE BLOCK  10-01-12    left paravertebral sympathetic  #4    NERVE BLOCK  10-10-12    paravertebral sympathetic left #5    NERVE BLOCK  10/24/12    left sympathetic    NERVE BLOCK  12    paravert sympathetic block left #7    NERVE BLOCK  12    left paravertebral sympathetic left #8    NERVE BLOCK Left 14    lumbar sympathetic #1    NERVE BLOCK  14    paravertebral sympathetic left #2    NERVE BLOCK Left 14    PARAVERTEBRAL SUMPATHETIV BLOCK #3    NERVE BLOCK Left 2014    left paravertebral sympathetic #5    NERVE BLOCK Left 14    left paravertebral sympathetic nerve block #6 14    NERVE BLOCK Left 10 8 14    paravert sympathetic #7    NERVE BLOCK N/A 2016    sympathetic #1    NERVE BLOCK  2016    right parasympathic nerve block lumbar #2    NERVE BLOCK Right 2016    paravertebral sympathetic right #3    NERVE BLOCK Right 08/10/2016    lumbar parasympathetic block #4    NERVE BLOCK Right 2016    paravertebral sympathetic right #5    NERVE BLOCK Right 2016    paravertebral sympathetic right #6    OTHER SURGICAL HISTORY  2017    laparscopic incisional hernia repair with mesh    TUBAL LIGATION         Social History:    Social History     Tobacco Use    Smoking status: Former Smoker     Packs/day: 0.50     Years: 30.00     Pack years: 15.00     Types: Cigarettes     Quit date: 2017     Years since quittin.2    Smokeless tobacco: Never Used   Substance Use Topics    Alcohol use: Yes     Alcohol/week: 2.0 standard drinks     Types: 2 Cans of beer per week     Comment: occasional                                Counseling given: Not Answered      Vital Signs (Current): There were no vitals filed for this visit.                                            BP Readings from Last 3 Encounters:   22 111/75   22 (!) 142/92   22 118/81       NPO Status:                           NPO greater than 8 hours                                                       BMI:   Wt Readings from Last 3 Encounters:   22 126 lb (57.2 kg)   22 131 lb (59.4 kg)   22 131 lb (59.4 kg)     There is no height or weight on file to calculate BMI.    CBC:   Lab Results   Component Value Date    WBC 5.4 2022    RBC 4.17 2022    HGB 13.1 2022    HCT 40.6 2022    MCV 97.4 2022    RDW 13.0 2022     2022       CMP:   Lab Results   Component Value Date     2022    K 3.9 2022     2022    CO2 24 2022    BUN 14 2022    CREATININE 0.8 2022    GFRAA >60 2022    LABGLOM >60 2022    GLUCOSE 113 2022    GLUCOSE 91 10/17/2011    PROT 6.8 2022    CALCIUM 9.4 2022    BILITOT 0.3 2022    ALKPHOS 107 2022    AST 34 2022    ALT 48 2022       POC Tests: No results for input(s): POCGLU, POCNA, POCK, POCCL, POCBUN, POCHEMO, POCHCT in the last 72 hours. Coags:   Lab Results   Component Value Date    PROTIME 11.7 2018    INR 1.0 2018    APTT 23.5 2018       HCG (If Applicable):   Lab Results   Component Value Date    PREGTESTUR NEGATIVE 2017        ABGs: No results found for: PHART, PO2ART, ZDA2VUF, TAL0DLY, BEART, R5NDPMTK     Type & Screen (If Applicable):  No results found for: LABABO, LABRH    Drug/Infectious Status (If Applicable):  No results found for: HIV, HEPCAB    COVID-19 Screening (If Applicable):   Lab Results   Component Value Date    COVID19 Not-Detected 2022    COVID19 Not Detected 2020       CT abd 2022      Impression   1.  No acute process in abdomen or pelvis.       2.  Hepatic steatosis.         CXR: 2022      Impression   No acute process. EK2022  Narrative & Impression    Normal sinus rhythm  When compared with ECG of 2021 11:42,  No significant change was found  Confirmed by Gigi Agrawal ([id number]) on 2022 10:44:19 PM      Specimen Collected: 22 10:50            ECHO: 2018   Summary   Left ventricular size is grossly normal.   Normal left ventricular wall thickness. Ejection fraction is measured at 53%. No evidence of left ventricular mass or thrombus noted. No regional wall motion abnormalities seen. Normal sized left atrium. Interatrial septum appears intact. No evidence of thrombus within left atrium. Physiologic and/or trace mitral regurgitation is present. No mitral valve stenosis present. Physiologic and/or trace tricuspid regurgitation. RVSP is 16.86 mmHg. Regular rhythm.       Signature      ----------------------------------------------------------------   Electronically signed by America Merlos DO(Interpreting   physician) on 2018 04:42 PM    Anesthesia Evaluation  Patient summary reviewed no history of anesthetic complications:   Airway: Mallampati: I  TM distance: >3 FB   Neck ROM: full  Mouth opening: > = 3 FB Dental:          Pulmonary: breath sounds clear to auscultation  (+) COPD:  asthma (well controlled neb when needed- used inhaler today ):                            Cardiovascular:  Exercise tolerance: good (>4 METS),   (+) hyperlipidemia      ECG reviewed  Rhythm: regular  Rate: normal  Echocardiogram reviewed         Beta Blocker:  Not on Beta Blocker         Neuro/Psych:   (+) neuromuscular disease (RSD lower limb, chronic neuropathic pain syndrome., ):,             GI/Hepatic/Renal:   (+) GERD:,           Endo/Other:    (+) hypothyroidism::., .                 Abdominal:             Vascular: negative vascular ROS. Other Findings:               Anesthesia Plan      MAC     ASA 3       Induction: intravenous. continuous noninvasive hemodynamic monitor    Anesthetic plan and risks discussed with patient. Use of blood products discussed with patient whom consented to blood products. Plan discussed with CRNA and attending.     Attending anesthesiologist reviewed and agrees with Preprocedure content        GINA Bergeron - CRNA        Bob Allen MD   5/3/2022

## 2022-05-03 NOTE — H&P
General Surgery History and Physical     Patient's Name/Date of Birth: Gurmeet Topete / 1960     Date: May 3, 2022      Surgeon: Brooks Barthel M.D.     PCP: Chuckie Strong DO     Chief Complaint: ruq pain     HPI:   Gurmeet Topete is a 63 y.o. female who presents for evaluation of ruq pain and diarrhea for 3 weeks and went to ED and CT was normal of abdomen and labs showed only mild chronic LFTs rise.  Timing is constant, radiation to entire abdomen, alleviated by nothing and started 3 weeks ago and severity is 2-9/10        Past Medical History        Past Medical History:   Diagnosis Date    Asthma       controlled with inhalers     BRCA1 negative       Patient thinks she had the genetic testing but don't know the results     Chronic pain      Chronic rhinitis     CRPS (complex regional pain syndrome), lower limb       left foot    Hyperlipidemia      Raynauds syndrome 2019    Right foot pain       for OR 20     RSD lower limb       left foot    Tinnitus              Past Surgical History         Past Surgical History:   Procedure Laterality Date    BREAST SURGERY   2009     lump left breast    BUNIONECTOMY   2011     left     SECTION   ,     ENDOMETRIAL ABLATION       ESOPHAGUS SURGERY        due to gerd    FOOT SURGERY Right 2020     TARSAL TUNNEL RELEASE RIGHT FOOT performed by Raymond Mata DPM at [address]   12     paravertebral sympathetic left side #1    NERVE BLOCK   12     paravert sympathetic left    NERVE BLOCK   2012     left paravertebral sympathetic #3    NERVE BLOCK   10-01-12     left paravertebral sympathetic  #4    NERVE BLOCK   10-10-12     paravertebral sympathetic left #5    NERVE BLOCK   10/24/12     left sympathetic    NERVE BLOCK   12     paravert sympathetic block left #7    NERVE BLOCK   12     left paravertebral sympathetic left #8    NERVE BLOCK Left 7/23/14     lumbar sympathetic #1    NERVE BLOCK   07/30/14     paravertebral sympathetic left #2    NERVE BLOCK Left 8/6/14     PARAVERTEBRAL SUMPATHETIV BLOCK #3    NERVE BLOCK Left 9/5/2014     left paravertebral sympathetic #5    NERVE BLOCK Left 09/17/14     left paravertebral sympathetic nerve block #6 9/17/14    NERVE BLOCK Left 10 8 14     paravert sympathetic #7    NERVE BLOCK N/A 07/06/2016     sympathetic #1    NERVE BLOCK   07/13/2016     right parasympathic nerve block lumbar #2    NERVE BLOCK Right 07/20/2016     paravertebral sympathetic right #3    NERVE BLOCK Right 08/10/2016     lumbar parasympathetic block #4    NERVE BLOCK Right 08/17/2016     paravertebral sympathetic right #5    NERVE BLOCK Right 08/24/2016     paravertebral sympathetic right #6    OTHER SURGICAL HISTORY   03/27/2017     laparscopic incisional hernia repair with mesh    TUBAL LIGATION   1982            Current Facility-Administered Medications          Current Outpatient Medications   Medication Sig Dispense Refill    celecoxib (CELEBREX) 50 MG capsule Take 1 capsule by mouth 2 times daily 60 capsule 3    topiramate (TOPAMAX) 25 MG tablet Take 1 tablet by mouth nightly 30 tablet 1    budesonide-formoterol (SYMBICORT) 160-4.5 MCG/ACT AERO INHALE TWO PUFFS BY MOUTH TWO TIMES A DAY  RINSE MOUTH AFTER USE        MOVANTIK 25 MG TABS tablet TAKE ONE TABLET BY MOUTH EVERY MORNING 90 tablet 0    pantoprazole (PROTONIX) 40 MG tablet TAKE ONE TABLET BY MOUTH EVERY DAY 90 tablet 0    levothyroxine (SYNTHROID) 50 MCG tablet TAKE ONE TABLET BY MOUTH EVERY DAY 90 tablet 1    levalbuterol (XOPENEX HFA) 45 MCG/ACT inhaler INHALE ONE PUFF BY MOUTH EVERY 4 HOURS AS NEEDED FOR WHEEZING 15 g 2    Folinic Acid-Vit B6-Vit B12 (FOLINIC-PLUS) 4-50-2 MG TABS TAKE ONE TABLET BY MOUTH EVERY DAY 90 tablet 0    DULoxetine (CYMBALTA) 20 MG extended release capsule TAKE ONE CAPSULE BY MOUTH DAILY 90 capsule 0    Calcium Carb-Cholecalciferol (CALCIUM 1000 + D) 1000-800 MG-UNIT TABS Take 1 tablet by mouth daily 90 tablet 1    atorvastatin (LIPITOR) 40 MG tablet Take 0.5 tablets by mouth daily (Patient taking differently: Take 40 mg by mouth daily ) 90 tablet 1    albuterol sulfate HFA (VENTOLIN HFA) 108 (90 Base) MCG/ACT inhaler Inhale 2 puffs into the lungs every 6 hours as needed for Wheezing 18 g 2    albuterol (PROVENTIL) (2.5 MG/3ML) 0.083% nebulizer solution INHALE 1/2 VIAL VIA NEBULIZER FOUR TIMES A DAY AS NEEDED FOR COUGH 120 each 0    azelastine (ASTELIN) 0.1 % nasal spray USE 2 SPRAYS IN EACH NOSTRIL TWICE DAILY AS DIRECTED 30 mL 2    NIFEdipine (PROCARDIA) 10 MG capsule TAKE ONE CAPSULE BY MOUTH TWO TIMES A DAY   5    SPIRIVA RESPIMAT 2.5 MCG/ACT AERS inhaler INHALE TWO PUFFS BY MOUTH once EVERY DAY   5    vitamin B-12 (CYANOCOBALAMIN) 100 MCG tablet Take 50 mcg by mouth daily.          vitamin E 400 UNIT capsule Take 400 Units by mouth daily.            No current facility-administered medications for this visit.            No Known Allergies     The patient has a family history that is negative for severe cardiovascular or respiratory issues, negative for reaction to anesthesia.     Social History               Socioeconomic History    Marital status: Single       Spouse name: Not on file    Number of children: 2    Years of education: Not on file    Highest education level: Not on file   Occupational History    Occupation: [employer]       Employer: [employer]   Tobacco Use    Smoking status: Former Smoker       Packs/day: 0.50       Years: 30.00       Pack years: 15.00       Types: Cigarettes       Quit date: 2017       Years since quittin.2    Smokeless tobacco: Never Used   Vaping Use    Vaping Use: Never used   Substance and Sexual Activity    Alcohol use:  Yes       Alcohol/week: 2.0 standard drinks       Types: 2 Cans of beer per week       Comment: occasional    Drug use: No    Sexual activity: Not on file   Other Topics Concern    Not on file   Social History Narrative    Not on file      Social Determinants of Health          Financial Resource Strain: Low Risk     Difficulty of Paying Living Expenses: Not hard at all   Food Insecurity: No Food Insecurity    Worried About Running Out of Food in the Last Year: Never true    [address] in the Last Year: Never true   Transportation Needs:     Lack of Transportation (Medical): Not on file    Lack of Transportation (Non-Medical):  Not on file   Physical Activity:     Days of Exercise per Week: Not on file    Minutes of Exercise per Session: Not on file   Stress:     Feeling of Stress : Not on file   Social Connections:     Frequency of Communication with Friends and Family: Not on file    Frequency of Social Gatherings with Friends and Family: Not on file    Attends Judaism Services: Not on file    Active Member of [address] [place] [address] or Organizations: Not on file    Attends Club or Organization Meetings: Not on file    Marital Status: Not on file   Intimate Partner Violence:     Fear of Current or Ex-Partner: Not on file    Emotionally Abused: Not on file    Physically Abused: Not on file    Sexually Abused: Not on file   Housing Stability:     Unable to Pay for Housing in the Last Year: Not on file    Number of [place] in the Last Year: Not on file    Unstable Housing in the Last Year: Not on file                  Review of Systems  Review of Systems -  General ROS: negative for - chills, fatigue or malaise  ENT ROS: negative for - hearing change, nasal congestion or nasal discharge  Allergy and Immunology ROS: negative for - hives, itchy/watery eyes or nasal congestion  Hematological and Lymphatic ROS: negative for - blood clots, blood transfusions, bruising or fatigue  Endocrine ROS: negative for - malaise/lethargy, mood swings, palpitations or polydipsia/polyuria  Respiratory ROS: negative for - sputum changes, stridor, tachypnea or wheezing  Cardiovascular ROS: negative for - irregular heartbeat, loss of consciousness, murmur or orthopnea  Gastrointestinal ROS: negative for - constipation, diarrhea, gas/bloating, heartburn or hematemesis  Genito-Urinary ROS: negative for -  genital discharge, genital ulcers or hematuria  Musculoskeletal ROS: negative for - gait disturbance, muscle pain or muscular weakness     Physical exam:   BP (!) 142/92 (Site: Left Upper Arm, Position: Sitting, Cuff Size: Medium Adult)   Pulse 123   Temp 97.2 °F (36.2 °C)   Ht 5' 4\" (1.626 m)   Wt 131 lb (59.4 kg)   BMI 22.49 kg/m²   General appearance:  NAD  Head: NCAT, PERRLA, EOMI, red conjunctiva  Neck: supple, no masses  Lungs: CTAB, equal chest rise bilateral  Heart: Reg rate  Abdomen: soft, nontender, nondistended  Skin; no lesions  Gu: no cva tenderness  Extremities: extremities normal, atraumatic, no cyanosis or edema        Radiology:  CT abdomen/pelvis: no acute process     Assessment:  63 y.o. female with ruq pain      Plan: Will do EGD and HIDA with [facility]  Discussed the risk, benefits and alternatives of surgery including wound infections, bleeding, scar  and the risks of anesthetic including MI, CVA, sudden death or reactions to anesthetic medications. The patient understands the risks and alternatives.  All questions were answered to the patient's satisfaction and they freely signed the consent.  Ren Ramirez MD

## 2022-05-03 NOTE — ANESTHESIA PRE PROCEDURE
Department of Anesthesiology  Preprocedure Note       Name:  Ashley Trejo   Age:  63 y.o.  :  1960                                          MRN:  [id number]         Date:  5/3/2022      Surgeon: Andres Guevara):  Jorge So MD    Procedure: Procedure(s):  EGD ESOPHAGOGASTRODUODENOSCOPY    Medications prior to admission:   Prior to Admission medications    Medication Sig Start Date End Date Taking?  Authorizing Provider   celecoxib (CELEBREX) 50 MG capsule Take 1 capsule by mouth 2 times daily 22   Kat Lagnuas,    topiramate (TOPAMAX) 25 MG tablet Take 1 tablet by mouth nightly 22   JUNIOR De Paz   budesonide-formoterol (SYMBICORT) 160-4.5 MCG/ACT AERO INHALE TWO PUFFS BY MOUTH TWO TIMES A DAY  RINSE MOUTH AFTER USE 3/13/22   Historical Provider, MD   MOVANTIK 25 MG TABS tablet TAKE ONE TABLET BY MOUTH EVERY MORNING 22   Robbie Robledo, DO   pantoprazole (PROTONIX) 40 MG tablet TAKE ONE TABLET BY MOUTH EVERY DAY 22   Marie Robledo, DO   levothyroxine (SYNTHROID) 50 MCG tablet TAKE ONE TABLET BY MOUTH EVERY DAY 22   Robbie Robledo, DO   levalbuterol (XOPENEX HFA) 45 MCG/ACT inhaler INHALE ONE PUFF BY MOUTH EVERY 4 HOURS AS NEEDED FOR WHEEZING 22   Robbie Robledo, DO   Folinic Acid-Vit B6-Vit B12 (FOLINIC-PLUS) 4-50-2 MG TABS TAKE ONE TABLET BY MOUTH EVERY DAY 22   Marie Robledo DO   DULoxetine (CYMBALTA) 20 MG extended release capsule TAKE ONE CAPSULE BY MOUTH DAILY 22   Lucía Robledo, DO   Calcium Carb-Cholecalciferol (CALCIUM 1000 + D) 1000-800 MG-UNIT TABS Take 1 tablet by mouth daily 22   Lucía Robledo,    atorvastatin (LIPITOR) 40 MG tablet Take 0.5 tablets by mouth daily  Patient taking differently: Take 40 mg by mouth daily Takes 40mg qd 22   Lucía Robledo, DO   albuterol sulfate HFA (VENTOLIN HFA) 108 (90 Base) MCG/ACT inhaler Inhale 2 puffs into the lungs every 6 hours as needed for Wheezing 1/11/22   Robbie Robledo DO   albuterol (PROVENTIL) (2.5 MG/3ML) 0.083% nebulizer solution INHALE 1/2 VIAL VIA NEBULIZER FOUR TIMES A DAY AS NEEDED FOR COUGH 5/26/21   Robbie Robledo DO   azelastine (ASTELIN) 0.1 % nasal spray USE 2 SPRAYS IN EACH NOSTRIL TWICE DAILY AS DIRECTED 9/23/19   Sagrario Retana DO   NIFEdipine (PROCARDIA) 10 MG capsule TAKE ONE CAPSULE BY MOUTH TWO TIMES A DAY 8/28/19   Historical Provider, MD   SPIRIVA RESPIMAT 2.5 MCG/ACT AERS inhaler INHALE TWO PUFFS BY MOUTH once EVERY DAY 4/11/18   Historical Provider, MD   vitamin B-12 (CYANOCOBALAMIN) 100 MCG tablet Take 50 mcg by mouth daily. Historical Provider, MD   vitamin E 400 UNIT capsule Take 400 Units by mouth daily. Historical Provider, MD       Current medications:    No current facility-administered medications for this encounter. Allergies:  No Known Allergies    Problem List:    Patient Active Problem List   Diagnosis Code    CRPS of the left foot. G90.529    Peripheral neuropathy of the left foot. G62.9    Status post bunionectomy Z98.890    chronic neuropathic pain syndrome.  M79.2    Thyroid nodule E04.1    Unilateral hearing loss H91.90    RSD lower limb G90.529    Chronic pain syndrome G89.4    Anemia D64.9    Underweight R63.6    Dependence on nicotine from cigarettes F17.210    Acute respiratory failure with hypoxia (HCC) J96.01    COPD exacerbation ([facility]) J44.1    Asthma J45.909    Elevated LFTs R79.89    Dyslipidemia E78.5    Abnormal laboratory test result R89.9       Past Medical History:        Diagnosis Date    Asthma     controlled with inhalers     BRCA1 negative     Patient thinks she had the genetic testing but don't know the results     Chronic pain     Chronic rhinitis 2011    CRPS (complex regional pain syndrome), lower limb     left foot    Hyperlipidemia     Raynauds syndrome 2019    Right foot pain     for OR 8-26-20     RSD lower limb     left foot    Tinnitus        Past Surgical History:        Procedure Laterality Date    BREAST SURGERY  2009    lump left breast    BUNIONECTOMY  2011    left     SECTION  ,     ENDOMETRIAL ABLATION  2009    ESOPHAGUS SURGERY      due to gerd    FOOT SURGERY Right 2020    TARSAL TUNNEL RELEASE RIGHT FOOT performed by Heather Harkins DPM at [place] [address]   [address]  12    paravertebral sympathetic left side #1    NERVE BLOCK  12    paravert sympathetic left    NERVE BLOCK  2012    left paravertebral sympathetic #3    NERVE BLOCK  10-01-12    left paravertebral sympathetic  #4    NERVE BLOCK  10-10-12    paravertebral sympathetic left #5    NERVE BLOCK  10/24/12    left sympathetic    NERVE BLOCK  12    paravert sympathetic block left #7    NERVE BLOCK  12    left paravertebral sympathetic left #8    NERVE BLOCK Left 14    lumbar sympathetic #1    NERVE BLOCK  14    paravertebral sympathetic left #2    NERVE BLOCK Left 14    PARAVERTEBRAL SUMPATHETIV BLOCK #3    NERVE BLOCK Left 2014    left paravertebral sympathetic #5    NERVE BLOCK Left 14    left paravertebral sympathetic nerve block #6 14    NERVE BLOCK Left 10 8 14    paravert sympathetic #7    NERVE BLOCK N/A 2016    sympathetic #1    NERVE BLOCK  2016    right parasympathic nerve block lumbar #2    NERVE BLOCK Right 2016    paravertebral sympathetic right #3    NERVE BLOCK Right 08/10/2016    lumbar parasympathetic block #4    NERVE BLOCK Right 2016    paravertebral sympathetic right #5    NERVE BLOCK Right 2016    paravertebral sympathetic right #6    OTHER SURGICAL HISTORY  2017    laparscopic incisional hernia repair with mesh    TUBAL LIGATION         Social History:    Social History     Tobacco Use    Smoking status: Former Smoker     Packs/day: 0.50 Years: 30.00     Pack years: 15.00     Types: Cigarettes     Quit date: 2017     Years since quittin.2    Smokeless tobacco: Never Used   Substance Use Topics    Alcohol use: Yes     Alcohol/week: 2.0 standard drinks     Types: 2 Cans of beer per week     Comment: occasional                                Counseling given: Not Answered      Vital Signs (Current):   Vitals:    22 1558 22 0630   BP:  111/75   Pulse:  97   Resp:  16   Temp:  36.3 °C (97.3 °F)   TempSrc:  Temporal   SpO2:  96%   Weight: 130 lb (59 kg) 126 lb (57.2 kg)   Height: 5' 4\" (1.626 m) 5' 4\" (1.626 m)                                              BP Readings from Last 3 Encounters:   22 111/75   22 (!) 142/92   22 118/81       NPO Status:                           NPO greater than 8 hours                                                       BMI:   Wt Readings from Last 3 Encounters:   22 126 lb (57.2 kg)   22 131 lb (59.4 kg)   22 131 lb (59.4 kg)     Body mass index is 21.63 kg/m². CBC:   Lab Results   Component Value Date    WBC 5.4 2022    RBC 4.17 2022    HGB 13.1 2022    HCT 40.6 2022    MCV 97.4 2022    RDW 13.0 2022     2022       CMP:   Lab Results   Component Value Date     2022    K 3.9 2022     2022    CO2 24 2022    BUN 14 2022    CREATININE 0.8 2022    GFRAA >60 2022    LABGLOM >60 2022    GLUCOSE 113 2022    GLUCOSE 91 10/17/2011    PROT 6.8 2022    CALCIUM 9.4 2022    BILITOT 0.3 2022    ALKPHOS 107 2022    AST 34 2022    ALT 48 2022       POC Tests: No results for input(s): POCGLU, POCNA, POCK, POCCL, POCBUN, POCHEMO, POCHCT in the last 72 hours.     Coags:   Lab Results   Component Value Date    PROTIME 11.7 2018    INR 1.0 2018    APTT 23.5 2018       HCG (If Applicable):   Lab Results   Component Value Date    PREGTESTUR NEGATIVE 2017        ABGs: No results found for: PHART, PO2ART, GYJ5RWS, XKF5SEG, BEART, Q0MBAMYE     Type & Screen (If Applicable):  No results found for: LABABO, LABRH    Drug/Infectious Status (If Applicable):  No results found for: HIV, HEPCAB    COVID-19 Screening (If Applicable):   Lab Results   Component Value Date    COVID19 Not-Detected 2022    COVID19 Not Detected 2020       CT abd 2022      Impression   1.  No acute process in abdomen or pelvis.       2.  Hepatic steatosis.         CXR: 2022      Impression   No acute process. EK2022  Narrative & Impression    Normal sinus rhythm  When compared with ECG of 2021 11:42,  No significant change was found  Confirmed by Gigi Agrawal ([id number]) on 2022 10:44:19 PM      Specimen Collected: 22 10:50            ECHO: 2018   Summary   Left ventricular size is grossly normal.   Normal left ventricular wall thickness. Ejection fraction is measured at 53%. No evidence of left ventricular mass or thrombus noted. No regional wall motion abnormalities seen. Normal sized left atrium. Interatrial septum appears intact. No evidence of thrombus within left atrium. Physiologic and/or trace mitral regurgitation is present. No mitral valve stenosis present. Physiologic and/or trace tricuspid regurgitation. RVSP is 16.86 mmHg. Regular rhythm.       Signature      ----------------------------------------------------------------   Electronically signed by Ally Aden DO(Interpreting   physician) on 2018 04:42 PM    Anesthesia Evaluation  Patient summary reviewed no history of anesthetic complications:   Airway: Mallampati: I  TM distance: >3 FB   Neck ROM: full  Mouth opening: > = 3 FB Dental:          Pulmonary: breath sounds clear to auscultation  (+) COPD:  asthma (well controlled neb when needed- used inhaler today ):                            Cardiovascular:  Exercise tolerance: good (>4 METS),   (+) hyperlipidemia      ECG reviewed  Rhythm: regular  Rate: normal  Echocardiogram reviewed         Beta Blocker:  Not on Beta Blocker         Neuro/Psych:   (+) neuromuscular disease (RSD lower limb, chronic neuropathic pain syndrome., ):,             GI/Hepatic/Renal:   (+) GERD:,           Endo/Other:    (+) hypothyroidism::., .                 Abdominal:             Vascular: negative vascular ROS. Other Findings:           Anesthesia Plan      MAC     ASA 3       Induction: intravenous. Anesthetic plan and risks discussed with patient. Use of blood products discussed with patient whom consented to blood products. Plan discussed with CRNA and attending.             GINA Zapata - CRNA        Digna Mcbride RN   5/3/2022

## 2022-05-06 ENCOUNTER — OFFICE VISIT (OUTPATIENT)
Dept: PHYSICAL MEDICINE AND REHAB | Age: 62
End: 2022-05-06
Payer: COMMERCIAL

## 2022-05-06 VITALS — BODY MASS INDEX: 21.51 KG/M2 | HEIGHT: 64 IN | WEIGHT: 126 LBS

## 2022-05-06 DIAGNOSIS — R20.2 PARESTHESIA: Primary | ICD-10-CM

## 2022-05-06 PROCEDURE — 95909 NRV CNDJ TST 5-6 STUDIES: CPT | Performed by: PHYSICAL MEDICINE & REHABILITATION

## 2022-05-06 PROCEDURE — 95886 MUSC TEST DONE W/N TEST COMP: CPT | Performed by: PHYSICAL MEDICINE & REHABILITATION

## 2022-05-06 NOTE — PROGRESS NOTES
[address] [facility]  Electrodiagnostic Laboratory  *Accredited by the [address] [place] [address] with exemplary status  [address] [facility] [address]  Phone: [phone]  Fax: [phone]    Referring Provider: Lito Springer DO  Primary Care Physician: Adryan Boucher DO  Patient Name: Ashley Trejo  Patient YOB: 1960  Gender: female  BMI: Body mass index is 21.63 kg/m². Height 5' 4\" (1.626 m), weight 126 lb (57.2 kg), not currently breastfeeding. 5/6/2022    Reason for Referral: Paresthesias    Description of clinical problem:   Chief Complaint   Patient presents with    Extremity Pain     none    Numbness     both legs are numb. both sides feel the same. 6 months of symp.  Extremity Weakness     decrease strength in legs. Sensory NCS      Nerve / Sites Rec. Site Peak Lat PP Amp Segments Distance Velocity Temp. ms µV  cm m/s °C   R Sural - Ankle (Calf)      Calf Ankle 4.06 13.1 Calf - Ankle 14 43 31.2   R Superficial peroneal - Ankle      Lat leg Ankle 2.66 24.3 Lat leg - Ankle 10 56 31.3       Motor NCS      Nerve / Sites Muscle Onset Amplitude Segments Distance Velocity Temp.     ms mV  cm m/s °C   R Peroneal - EDB      Ankle EDB 3.33 11.4 Ankle - EDB 8  31.3      Fib head EDB 8.85 11.3 Fib head - Ankle 27 49 31.3      Above Knee EDB 10.63 11.5 Above Knee - Fib head 10 56 31.3   R Tibial - AH      Ankle AH 3.33 15.4 Ankle - AH 8  31      Pop fossa AH 11.77 10.9 Pop fossa - Ankle 40 47 31       F  Wave      Nerve Fmin % F    ms %   R Peroneal - EDB 42.14 100   R Tibial - AH 49.32 100       H Reflex      Nerve H Lat    ms   R Tibial - Soleus 29.01   L Tibial - Soleus 28.91       EMG      EMG Summary Table     Spontaneous MUAP Recruitment   Muscle Nerve Roots IA Fib PSW Fasc Amp Dur. PPP Pattern   R. Vastus medialis Femoral L2-L4 N None None None N N N N   R. Tibialis anterior Deep peroneal (Fibular) L4-L5 N None None None N N N N   R.  Gastrocnemius (Medial head) Tibial S1-S2 N None None None N N N N   R. Extensor hallucis longus Deep peroneal (Fibular) L5-S1 N None None None N N N N   R. Lumbar paraspinals (low)  - N None None None N N N N   R. Lumbar paraspinals (mid)  - N None None None N N N N   R. Lumbar paraspinals (up)  - N None None None N N N N   R. Thoracic paraspinals (low)  - N None None None N N N N   R. Thoracic paraspinals (mid)  - N None None None N N N N   R. Thoracic paraspinals (up)  - N None None None N N N N          Study Limitations:  none    Summary of Findings:   · Nerve conduction studies:   · All nerve conduction studies, as listed in the table were normal in latency, amplitude and conduction velocity. Needle EMG:   · Needle EMG was performed using a concentric needle. · Observed motor units were normal in amplitude, duration, phases and recruitment and no active denervation signs were seen. Diagnostic Interpretation: This study was normal.     Previous Study: There is not a prior study for comparison. Technologist: Kira Gupta  Physician:    Gab Paris D.O., P.T. Board Certified Physical Medicine and Rehabilitation  Board Certified Electrodiagnostic Medicine      Nerve conduction studies and electromyography were performed according to our laboratory policies and procedures which can be provided upon request. All abnormal values are identified in the table.  Laboratory normal values can also be provided upon request.       Cc: DO Reji Grace DO

## 2022-05-06 NOTE — PATIENT INSTRUCTIONS
Electrodiagnotic Laboratory  Accredited by the [employer] with Exemplary status  MARY Aguila D.O. [facility]  [address] [facility]  Phone: [phone]  Fax: [phone]        Today you had an electrodiagnostic exam which included nerve conduction studies (NCS) and electromyography (EMG). This test evaluated the electrical activity of your nerves and muscles to help determine if you have a nerve or muscle disease. This test can help determine the location and type of a nerve or muscle problem. This will help your referring doctor diagnose your condition and determine the appropriate next step in your treatment plan. After your test:    1. There are no long lasting side effects of the test.     2. You may resume your normal activities without restrictions. 3.  Resume any medications that were stopped for the test.     4  If you have sore areas or bruising in your muscles where the needle was placed, apply a cold pack to the sore area for 15-20 minutes three to four times a day as needed for pain. The soreness should go away in about 1-2 days. 5. Your results were provided  Briefly at the end of your test and the final detailed report will be provided to your referring physician, and/or primary care physician and any other parties you requested within 1-2 days of the examination. You may wish to contact your referring provider after a few days to determine what they would like you to do next. 6.  Please call [phone] with any questions or concerns and if you develop increased body temperature/fever, swelling, tenderness, increased pain and/or drainage from the sites where the needle was placed. Thank you for choosing us for your health care needs.

## 2022-05-09 ENCOUNTER — TELEPHONE (OUTPATIENT)
Dept: PRIMARY CARE CLINIC | Age: 62
End: 2022-05-09

## 2022-05-09 RX ORDER — PROPRANOLOL HCL 60 MG
CAPSULE, EXTENDED RELEASE 24HR ORAL
Qty: 30 CAPSULE | Refills: 0 | OUTPATIENT
Start: 2022-05-09

## 2022-05-09 NOTE — TELEPHONE ENCOUNTER
Pt dropped of paper work for [employer]  (Request for reasonable accommodation-confidential)     Call pt at [phone] when paperwork is ready to .      Last seen 4/19/2022  Next appt 6/13/2022

## 2022-05-10 ENCOUNTER — HOSPITAL ENCOUNTER (OUTPATIENT)
Dept: NUCLEAR MEDICINE | Age: 62
Discharge: HOME | End: 2022-05-10
Payer: COMMERCIAL

## 2022-05-10 VITALS — BODY MASS INDEX: 21.63 KG/M2 | WEIGHT: 126 LBS

## 2022-05-10 DIAGNOSIS — R10.11 RUQ PAIN: ICD-10-CM

## 2022-05-10 PROCEDURE — 2580000003 HC RX 258: Performed by: RADIOLOGY

## 2022-05-10 PROCEDURE — 78227 HEPATOBIL SYST IMAGE W/DRUG: CPT

## 2022-05-10 PROCEDURE — A9537 TC99M MEBROFENIN: HCPCS | Performed by: RADIOLOGY

## 2022-05-10 PROCEDURE — 6360000002 HC RX W HCPCS: Performed by: RADIOLOGY

## 2022-05-10 PROCEDURE — 3430000000 HC RX DIAGNOSTIC RADIOPHARMACEUTICAL: Performed by: RADIOLOGY

## 2022-05-10 RX ADMIN — SODIUM CHLORIDE 1.14 MCG: 9 INJECTION, SOLUTION INTRAVENOUS at 11:57

## 2022-05-10 RX ADMIN — Medication 6 MILLICURIE: at 10:56

## 2022-05-16 DIAGNOSIS — Z01.812 BLOOD TESTS PRIOR TO TREATMENT OR PROCEDURE: Primary | ICD-10-CM

## 2022-05-16 DIAGNOSIS — R10.11 RUQ PAIN: Primary | ICD-10-CM

## 2022-05-20 DIAGNOSIS — Z01.812 BLOOD TESTS PRIOR TO TREATMENT OR PROCEDURE: ICD-10-CM

## 2022-05-20 LAB
BUN BLDV-MCNC: 18 MG/DL (ref 6–23)
CREAT SERPL-MCNC: 0.9 MG/DL (ref 0.5–1)
GFR AFRICAN AMERICAN: >60
GFR NON-AFRICAN AMERICAN: >60 ML/MIN/1.73

## 2022-05-23 ENCOUNTER — HOSPITAL ENCOUNTER (OUTPATIENT)
Dept: CT IMAGING | Age: 62
Discharge: HOME | End: 2022-05-23
Payer: COMMERCIAL

## 2022-05-23 DIAGNOSIS — R10.11 RUQ PAIN: ICD-10-CM

## 2022-05-23 PROCEDURE — 6360000004 HC RX CONTRAST MEDICATION: Performed by: RADIOLOGY

## 2022-05-23 PROCEDURE — 74177 CT ABD & PELVIS W/CONTRAST: CPT

## 2022-05-23 RX ADMIN — IOHEXOL 50 ML: 240 INJECTION, SOLUTION INTRATHECAL; INTRAVASCULAR; INTRAVENOUS; ORAL at 10:15

## 2022-05-23 RX ADMIN — IOPAMIDOL 75 ML: 755 INJECTION, SOLUTION INTRAVENOUS at 10:15

## 2022-05-26 ENCOUNTER — TELEPHONE (OUTPATIENT)
Dept: SURGERY | Age: 62
End: 2022-05-26

## 2022-05-26 ENCOUNTER — OFFICE VISIT (OUTPATIENT)
Dept: SURGERY | Age: 62
End: 2022-05-26
Payer: COMMERCIAL

## 2022-05-26 VITALS
BODY MASS INDEX: 21.51 KG/M2 | DIASTOLIC BLOOD PRESSURE: 86 MMHG | TEMPERATURE: 99.1 F | HEIGHT: 64 IN | WEIGHT: 126 LBS | HEART RATE: 115 BPM | SYSTOLIC BLOOD PRESSURE: 125 MMHG

## 2022-05-26 DIAGNOSIS — K80.50 BILIARY COLIC: Primary | ICD-10-CM

## 2022-05-26 PROCEDURE — 99214 OFFICE O/P EST MOD 30 MIN: CPT | Performed by: SURGERY

## 2022-05-26 NOTE — PROGRESS NOTES
General Surgery History and Physical    Patient's Name/Date of Birth: Diana Rodriges / 1960    Date: May 26, 2022     Surgeon: Luiz Zee M.D.    PCP: Kesha Vick DO     Chief Complaint: biliary colic    HPI:   Diana Rodriges is a 63 y.o. female who presents for evaluation of biliary colic.  Timing is intermittent, radiation to back, alleviated by npo and started several weeks ago      Past Medical History:   Diagnosis Date    Asthma     controlled with inhalers     BRCA1 negative     Patient thinks she had the genetic testing but don't know the results     Chronic pain     Chronic rhinitis     CRPS (complex regional pain syndrome), lower limb     left foot    Hyperlipidemia     Raynauds syndrome 2019    Right foot pain     for OR 20     RSD lower limb     left foot    Thyroid disease     Tinnitus        Past Surgical History:   Procedure Laterality Date    BREAST SURGERY  2009    lump left breast    BUNIONECTOMY  2011    left     SECTION  ,     ENDOMETRIAL ABLATION      ESOPHAGUS SURGERY      due to gerd    FOOT SURGERY Right 2020    TARSAL TUNNEL RELEASE RIGHT FOOT performed by Charmaine Burton DPM at [address]  12    paravertebral sympathetic left side #1    NERVE BLOCK  12    paravert sympathetic left    NERVE BLOCK  2012    left paravertebral sympathetic #3    NERVE BLOCK  10-01-12    left paravertebral sympathetic  #4    NERVE BLOCK  10-10-12    paravertebral sympathetic left #5    NERVE BLOCK  10/24/12    left sympathetic    NERVE BLOCK  12    paravert sympathetic block left #7    NERVE BLOCK  12    left paravertebral sympathetic left #8    NERVE BLOCK Left 14    lumbar sympathetic #1    NERVE BLOCK  14    paravertebral sympathetic left #2    NERVE BLOCK Left 14    PARAVERTEBRAL SUMPATHETIV BLOCK #3    NERVE BLOCK Left 9/5/2014    left paravertebral sympathetic #5    NERVE BLOCK Left 09/17/14    left paravertebral sympathetic nerve block #6 9/17/14    NERVE BLOCK Left 10 8 14    paravert sympathetic #7    NERVE BLOCK N/A 07/06/2016    sympathetic #1    NERVE BLOCK  07/13/2016    right parasympathic nerve block lumbar #2    NERVE BLOCK Right 07/20/2016    paravertebral sympathetic right #3    NERVE BLOCK Right 08/10/2016    lumbar parasympathetic block #4    NERVE BLOCK Right 08/17/2016    paravertebral sympathetic right #5    NERVE BLOCK Right 08/24/2016    paravertebral sympathetic right #6    OTHER SURGICAL HISTORY  03/27/2017    laparscopic incisional hernia repair with mesh    TUBAL LIGATION  1982    UPPER GASTROINTESTINAL ENDOSCOPY N/A 5/3/2022    EGD BIOPSY performed by Stephanie Smith MD at [facility] ENDOSCOPY       Current Outpatient Medications   Medication Sig Dispense Refill    MOVANTIK 25 MG TABS tablet TAKE ONE TABLET BY MOUTH EVERY MORNING 90 tablet 0    pantoprazole (PROTONIX) 40 MG tablet TAKE ONE TABLET BY MOUTH EVERY DAY 90 tablet 0    celecoxib (CELEBREX) 50 MG capsule Take 1 capsule by mouth 2 times daily 60 capsule 3    topiramate (TOPAMAX) 25 MG tablet Take 1 tablet by mouth nightly 30 tablet 1    budesonide-formoterol (SYMBICORT) 160-4.5 MCG/ACT AERO INHALE TWO PUFFS BY MOUTH TWO TIMES A DAY  RINSE MOUTH AFTER USE      levothyroxine (SYNTHROID) 50 MCG tablet TAKE ONE TABLET BY MOUTH EVERY DAY 90 tablet 1    levalbuterol (XOPENEX HFA) 45 MCG/ACT inhaler INHALE ONE PUFF BY MOUTH EVERY 4 HOURS AS NEEDED FOR WHEEZING 15 g 2    Folinic Acid-Vit B6-Vit B12 (FOLINIC-PLUS) 4-50-2 MG TABS TAKE ONE TABLET BY MOUTH EVERY DAY 90 tablet 0    DULoxetine (CYMBALTA) 20 MG extended release capsule TAKE ONE CAPSULE BY MOUTH DAILY 90 capsule 0    Calcium Carb-Cholecalciferol (CALCIUM 1000 + D) 1000-800 MG-UNIT TABS Take 1 tablet by mouth daily 90 tablet 1    atorvastatin (LIPITOR) 40 MG tablet Take 0.5 tablets by mouth daily (Patient taking differently: Take 40 mg by mouth daily Takes 40mg qd) 90 tablet 1    albuterol sulfate HFA (VENTOLIN HFA) 108 (90 Base) MCG/ACT inhaler Inhale 2 puffs into the lungs every 6 hours as needed for Wheezing 18 g 2    albuterol (PROVENTIL) (2.5 MG/3ML) 0.083% nebulizer solution INHALE 1/2 VIAL VIA NEBULIZER FOUR TIMES A DAY AS NEEDED FOR COUGH 120 each 0    azelastine (ASTELIN) 0.1 % nasal spray USE 2 SPRAYS IN EACH NOSTRIL TWICE DAILY AS DIRECTED 30 mL 2    NIFEdipine (PROCARDIA) 10 MG capsule TAKE ONE CAPSULE BY MOUTH TWO TIMES A DAY  5    SPIRIVA RESPIMAT 2.5 MCG/ACT AERS inhaler INHALE TWO PUFFS BY MOUTH once EVERY DAY  5    vitamin B-12 (CYANOCOBALAMIN) 100 MCG tablet Take 50 mcg by mouth daily.  vitamin E 400 UNIT capsule Take 400 Units by mouth daily. No current facility-administered medications for this visit. No Known Allergies    The patient has a family history that is negative for severe cardiovascular or respiratory issues, negative for reaction to anesthesia. Social History     Socioeconomic History    Marital status: Single     Spouse name: Not on file    Number of children: 2    Years of education: Not on file    Highest education level: Not on file   Occupational History    Occupation: [employer]     Employer: [employer]   Tobacco Use    Smoking status: Former Smoker     Packs/day: 0.50     Years: 30.00     Pack years: 15.00     Types: Cigarettes     Quit date: 2017     Years since quittin.3    Smokeless tobacco: Never Used   Vaping Use    Vaping Use: Never used   Substance and Sexual Activity    Alcohol use:  Yes     Alcohol/week: 2.0 standard drinks     Types: 2 Cans of beer per week     Comment: occasional    Drug use: No    Sexual activity: Not on file   Other Topics Concern    Not on file   Social History Narrative    Not on file     Social Determinants of Health     Financial Resource Strain: Low Risk  Difficulty of Paying Living Expenses: Not hard at all   Food Insecurity: No Food Insecurity    Worried About Running Out of Food in the Last Year: Never true    Ran Out of Food in the Last Year: Never true   Transportation Needs:     Lack of Transportation (Medical): Not on file    Lack of Transportation (Non-Medical):  Not on file   Physical Activity:     Days of Exercise per Week: Not on file    Minutes of Exercise per Session: Not on file   Stress:     Feeling of Stress : Not on file   Social Connections:     Frequency of Communication with Friends and Family: Not on file    Frequency of Social Gatherings with Friends and Family: Not on file    Attends Latter-day Services: Not on file    Active Member of [address] [place] [address] or Organizations: Not on file    Attends Club or Organization Meetings: Not on file    Marital Status: Not on file   Intimate Partner Violence:     Fear of Current or Ex-Partner: Not on file    Emotionally Abused: Not on file    Physically Abused: Not on file    Sexually Abused: Not on file   Housing Stability:     Unable to Pay for Housing in the Last Year: Not on file    Number of [place] in the Last Year: Not on file    Unstable Housing in the Last Year: Not on file           Review of Systems  Review of Systems -  General ROS: negative for - chills, fatigue or malaise  ENT ROS: negative for - hearing change, nasal congestion or nasal discharge  Allergy and Immunology ROS: negative for - hives, itchy/watery eyes or nasal congestion  Hematological and Lymphatic ROS: negative for - blood clots, blood transfusions, bruising or fatigue  Endocrine ROS: negative for - malaise/lethargy, mood swings, palpitations or polydipsia/polyuria  Breast ROS: negative for - new or changing breast lumps or nipple changes  Respiratory ROS: negative for - sputum changes, stridor, tachypnea or wheezing  Cardiovascular ROS: negative for - irregular heartbeat, loss of consciousness, murmur or orthopnea  Gastrointestinal ROS: negative for - constipation, diarrhea, gas/bloating, heartburn or hematemesis, pain and nausea  Genito-Urinary ROS: negative for -  genital discharge, genital ulcers or hematuria  Musculoskeletal ROS: negative for - gait disturbance, muscle pain or muscular weakness    Physical exam:   /86 (Site: Left Upper Arm, Position: Sitting, Cuff Size: Medium Adult)   Pulse (!) 115   Temp 99.1 °F (37.3 °C)   Ht 5' 4\" (1.626 m)   Wt 126 lb (57.2 kg)   BMI 21.63 kg/m²   General appearance:  NAD  Pyscho/social: negative for tremors and hallucinations  Head: NCAT, PERRLA, EOMI, red conjunctiva  Neck: supple, no masses  Lungs: CTAB, equal chest rise bilateral  Heart: Reg rate  Abdomen: soft, nontender, nondistended  Skin; no lesions  Gu: no cva tenderness  Extremities: extremities normal, atraumatic, no cyanosis or edema    Assessment:  Biliary colic     Plan: To OR  Discussed the risk, benefits and alternatives of surgery including wound infections, bleeding, scar and hernia formation and the risks of general anesthetic including MI, CVA, sudden death or reactions to anesthetic medications, they understand ductal injury and its consequences, liver vascular injury and its consequences and other near organ injury. . The patient understands the risks and alternatives and the possibility of converting to an open procedure. All questions were answered to the patient's satisfaction and they freely signed the consent. We discussed with a normal gb workup but classic symptoms of gallbladder disease the chance of symptomatic resolution is 85% and there is a chance of no changes of symptoms post op. They understand and wish to proceed.       Preston Haskins MD  10:54 AM  5/26/2022

## 2022-05-26 NOTE — TELEPHONE ENCOUNTER
Per the order of Dr. Kelly Betancourt, patient has been scheduled for Laparoscopic cholecystectomy on 6/15/22. Patient provided with the procedure information during visit and verbalized understanding. Patient instructed to please contact our office with any questions. Procedure scheduled through iQueue. Dr. Kelly Betancourt to enter orders. Prior Authorization Form:      DEMOGRAPHICS:                     Patient Name:  Jono Barber  Patient :  1960            Insurance:  Payor: . [employer] [employer] / Plan: [place] [employer] [place] [address] - [place] PPO / Product Type: *No Product type* /   Insurance ID Number:    Payor/Plan Subscr  Sex Relation Sub. Ins. ID Effective Group Num   1.  [address] 1960 Female Self [id number] 20 [id number]                                   [address]         DIAGNOSIS & PROCEDURE:                       Procedure/Operation: Laparoscopic cholecystectomy           CPT Code: 81485    Diagnosis:  Biliary colic    PWT75 Code: O00.38    Location:  [place] [address]    Surgeon:  Tania White INFORMATION:                          Date: 6/15/22    Time: TBD              Anesthesia:  General                                                       Status:  Outpatient        Special Comments:  N/A       Electronically signed by Dalton Chowdhury LPN on  at 51:35 AM

## 2022-06-08 DIAGNOSIS — J44.9 CHRONIC OBSTRUCTIVE PULMONARY DISEASE, UNSPECIFIED COPD TYPE (HCC): ICD-10-CM

## 2022-06-09 RX ORDER — LEVALBUTEROL TARTRATE 45 UG/1
AEROSOL, METERED ORAL
Qty: 15 G | Refills: 0 | Status: SHIPPED
Start: 2022-06-09 | End: 2022-07-07

## 2022-06-13 ENCOUNTER — OFFICE VISIT (OUTPATIENT)
Dept: PRIMARY CARE CLINIC | Age: 62
End: 2022-06-13
Payer: COMMERCIAL

## 2022-06-13 VITALS
OXYGEN SATURATION: 95 % | TEMPERATURE: 98.2 F | BODY MASS INDEX: 24.64 KG/M2 | SYSTOLIC BLOOD PRESSURE: 118 MMHG | HEART RATE: 94 BPM | DIASTOLIC BLOOD PRESSURE: 76 MMHG | WEIGHT: 125.5 LBS | HEIGHT: 60 IN | RESPIRATION RATE: 16 BRPM

## 2022-06-13 DIAGNOSIS — G62.9 NEUROPATHY: ICD-10-CM

## 2022-06-13 DIAGNOSIS — D72.819 LEUKOPENIA, UNSPECIFIED TYPE: ICD-10-CM

## 2022-06-13 PROCEDURE — 99213 OFFICE O/P EST LOW 20 MIN: CPT | Performed by: FAMILY MEDICINE

## 2022-06-13 RX ORDER — DULOXETIN HYDROCHLORIDE 20 MG/1
CAPSULE, DELAYED RELEASE ORAL
Qty: 90 CAPSULE | Refills: 0 | Status: SHIPPED | OUTPATIENT
Start: 2022-06-13

## 2022-06-13 RX ORDER — CYANOCOBALAMIN/FOLIC AC/VIT B6 1-2.5-25MG
TABLET ORAL
COMMUNITY
Start: 2022-05-14

## 2022-06-13 RX ORDER — LEUCOVORIN/PYRIDOX/MECOBALAMIN 4-50-2 MG
TABLET ORAL
Qty: 90 TABLET | Refills: 0 | Status: SHIPPED | OUTPATIENT
Start: 2022-06-13

## 2022-06-13 SDOH — ECONOMIC STABILITY: FOOD INSECURITY: WITHIN THE PAST 12 MONTHS, YOU WORRIED THAT YOUR FOOD WOULD RUN OUT BEFORE YOU GOT MONEY TO BUY MORE.: NEVER TRUE

## 2022-06-13 SDOH — ECONOMIC STABILITY: FOOD INSECURITY: WITHIN THE PAST 12 MONTHS, THE FOOD YOU BOUGHT JUST DIDN'T LAST AND YOU DIDN'T HAVE MONEY TO GET MORE.: NEVER TRUE

## 2022-06-13 ASSESSMENT — SOCIAL DETERMINANTS OF HEALTH (SDOH): HOW HARD IS IT FOR YOU TO PAY FOR THE VERY BASICS LIKE FOOD, HOUSING, MEDICAL CARE, AND HEATING?: NOT HARD AT ALL

## 2022-06-13 NOTE — PROGRESS NOTES
Subjective:  63 y.o. female who presents to the office today with chief complaint:  Chief Complaint   Patient presents with   Arpita Mendoza     left side pain     Abdominal pain: Continues on L side. To have kiara Jose later this week. Had some bleeding from rectum day after hida scan- Dr. Jerri Jose made aware per pt. She does follow with Dr. Brenda urbano GI- she agrees to call him. Health Maintenance Due   Topic Date Due    Pneumococcal 0-64 years Vaccine (2 - PCV) 10/22/2019    Cervical cancer screen  05/14/2021    Shingles vaccine (2 of 2) 02/25/2022       Cancer History:  Family Cancer History:    Review of Systems    Review of Systems: All bolded are positive, all others are negative. General:  Fever, chills, diaphoresis, fatigue, malaise, night sweats, weight loss  Psychological:  Anxiety, disorientation, hallucinations. ENT:  Epistaxis, headaches, vertigo, visual changes. Cardiovascular:  Chest pain, irregular heartbeats, palpitations, paroxysmal nocturnal dyspnea. Respiratory:  Shortness of breath, coughing, sputum production, hemoptysis, wheezing, orthopnea. Gastrointestinal:  Nausea, vomiting, diarrhea, heartburn, constipation, abdominal pain, hematemesis, hematochezia, melena, acholic stools  Genito-Urinary:  Dysuria, urgency, frequency, hematuria  Musculoskeletal:  Joint pain, joint stiffness, joint swelling, muscle pain  Neurology:  Headache, focal neurological deficits, weakness, numbness, paresthesia  Derm:  Rashes, ulcers, excoriations, bruising  Extremities:  Decreased ROM, peripheral edema, mottling      Objective:  Vitals:    06/13/22 1459   BP: 118/76   Pulse: 94   Resp: 16   Temp: 98.2 °F (36.8 °C)   SpO2: 95%     Physical Exam  Constitutional:       General: She is not in acute distress. Appearance: She is well-developed. She is not diaphoretic. HENT:      Head: Normocephalic and atraumatic.       Right Ear: External ear normal.      Left Ear: External ear normal.      Nose: Nose normal.      Mouth/Throat:      Pharynx: No oropharyngeal exudate. Eyes:      General:         Right eye: No discharge. Left eye: No discharge. Conjunctiva/sclera: Conjunctivae normal.      Pupils: Pupils are equal, round, and reactive to light. Musculoskeletal:      Cervical back: Normal range of motion and neck supple. Lymphadenopathy:      Cervical: No cervical adenopathy. Neurological:      Mental Status: She is alert and oriented to person, place, and time. Psychiatric:         Mood and Affect: Mood normal.         Behavior: Behavior normal.         Thought Content: Thought content normal.         Judgment: Judgment normal.           Results for orders placed or performed in visit on 05/20/22   BUN   Result Value Ref Range    BUN 18 6 - 23 mg/dL   Creatinine   Result Value Ref Range    CREATININE 0.9 0.5 - 1.0 mg/dL    GFR Non-African American >60 >=60 mL/min/1.73    GFR African American >60          Assessment and Plan:  Braxton Drake was seen today for check-up. Diagnoses and all orders for this visit:    Neuropathy  -     DULoxetine (CYMBALTA) 20 MG extended release capsule; TAKE ONE CAPSULE BY MOUTH DAILY    Leukopenia, unspecified type  -     Folinic Acid-Vit B6-Vit B12 (FOLINIC-PLUS) 4-50-2 MG TABS; TAKE ONE TABLET BY MOUTH EVERY DAY        1. Biliary colic: Cholecystectomy with Dr. Rahul Davis later this week. 2. LLQ pain: Pt to call Dr. Polly Duncan. Imaging of abdomen and pelvis has been unremarkable. She follows with Dr. Riddhi Shell for chronic back pain as well. No follow-ups on file. Patient may come in sooner if needed for medical concerns. Patient advised to call at any time to cancel, re-schedule, or for any questions/concerns.             Shaka Robledo DO     6/13/22    7:52 AM

## 2022-06-14 ENCOUNTER — PREP FOR PROCEDURE (OUTPATIENT)
Dept: SURGERY | Age: 62
End: 2022-06-14

## 2022-06-14 RX ORDER — SODIUM CHLORIDE 9 MG/ML
INJECTION, SOLUTION INTRAVENOUS PRN
Status: CANCELLED | OUTPATIENT
Start: 2022-06-14

## 2022-06-14 RX ORDER — SODIUM CHLORIDE, SODIUM LACTATE, POTASSIUM CHLORIDE, CALCIUM CHLORIDE 600; 310; 30; 20 MG/100ML; MG/100ML; MG/100ML; MG/100ML
INJECTION, SOLUTION INTRAVENOUS CONTINUOUS
Status: CANCELLED | OUTPATIENT
Start: 2022-06-14

## 2022-06-14 RX ORDER — SODIUM CHLORIDE 0.9 % (FLUSH) 0.9 %
5-40 SYRINGE (ML) INJECTION EVERY 12 HOURS SCHEDULED
Status: CANCELLED | OUTPATIENT
Start: 2022-06-14

## 2022-06-14 RX ORDER — SODIUM CHLORIDE 0.9 % (FLUSH) 0.9 %
5-40 SYRINGE (ML) INJECTION PRN
Status: CANCELLED | OUTPATIENT
Start: 2022-06-14

## 2022-06-14 NOTE — PROGRESS NOTES
[address]        Date: 6/14/2022    Date of surgery: 6/15/2022    Arrival Time: Hospital will call you between 5pm and 7pm with your final arrival time for surgery    1. Nothing by mouth (NPO) as instructed.______midnight ______________________________________________________________    2. Take the following medications with a small sip of water on the morning of Surgery:  Take thyroid protonix nfedipine and cymbalta dos    3. Diabetics may take evening dose of insulin but none after midnight. If you feel symptomatic or low blood sugar morning of surgery drink 1-2 ounces of apple juice only. 4. Aspirin, Ibuprofen, Advil, Naproxen, Vitamin E and other Anti-inflammatory products should be stopped  before surgery  as directed by your physician. Take Tylenol only unless instructed otherwise by your surgeon. 5. Check with your Doctor regarding stopping Plavix, Coumadin, Lovenox, Eliquis, Effient, or other blood thinners. 6. Do not smoke,use illicit drugs and do not drink any alcoholic beverages 24 hours prior to surgery. 7. You may brush your teeth the morning of surgery. DO NOT SWALLOW WATER    8. You MUST make arrangements for a responsible adult to take you home after your surgery. You will not be allowed to leave alone or drive yourself home. It is strongly suggested someone stay with you the first 24 hrs. Your surgery will be cancelled if you do not have a ride home. 9. PEDIATRIC PATIENTS ONLY:  A parent/legal guardian must accompany a child scheduled for surgery and plan to stay at the hospital until the child is discharged. Please do not bring other children with you.     10. Please wear simple, loose fitting clothing to the hospital.  Do not bring valuables (money, credit cards, checkbooks, etc.) Do not wear any makeup (including no eye makeup) or nail polish on your fingers or toes. 11. DO NOT wear any jewelry or piercings on day of surgery. All body piercing jewelry must be removed. 12. Shower the night before surgery with _x__Antibacterial soap /LORENA WIPES________    13. TOTAL JOINT REPLACEMENT/HYSTERECTOMY PATIENTS ONLY---Remember to bring Blood Bank bracelet to the hospital on the day of surgery. 14. If you have a Living Will and Durable Power of  for Healthcare, please bring in a copy. 15. If appropriate bring crutches, inspirex, WALKER, CANE etc... 12. Notify your Surgeon if you develop any illness between now and surgery time, cough, cold, fever, sore throat, nausea, vomiting, etc.  Please notify your surgeon if you experience dizziness, shortness of breath or blurred vision between now & the time of your surgery. 17. If you have ___dentures, they will be removed before going to the OR; we will provide you a container. If you wear ___contact lenses or ___glasses, they will be removed; please bring a case for them. 18. To provide excellent care visitors will be limited to 2 in the room at any given time. 19. Please bring picture ID and insurance card. 20. During flu season no children under the age of 15 are permitted in the hospital for the safety of all patients. 21. Other                  Please call AMBULATORY CARE if you have any further questions.    [address] [facility]     [address]

## 2022-06-15 ENCOUNTER — HOSPITAL ENCOUNTER (OUTPATIENT)
Age: 62
Setting detail: OUTPATIENT SURGERY
Discharge: HOSPICE | End: 2022-06-15
Attending: SURGERY | Admitting: SURGERY
Payer: COMMERCIAL

## 2022-06-15 ENCOUNTER — ANESTHESIA (OUTPATIENT)
Dept: OPERATING ROOM | Age: 62
End: 2022-06-15
Payer: COMMERCIAL

## 2022-06-15 ENCOUNTER — ANESTHESIA EVENT (OUTPATIENT)
Dept: OPERATING ROOM | Age: 62
End: 2022-06-15
Payer: COMMERCIAL

## 2022-06-15 VITALS
DIASTOLIC BLOOD PRESSURE: 66 MMHG | BODY MASS INDEX: 21.34 KG/M2 | WEIGHT: 125 LBS | HEIGHT: 64 IN | TEMPERATURE: 97.3 F | SYSTOLIC BLOOD PRESSURE: 121 MMHG | OXYGEN SATURATION: 94 % | HEART RATE: 87 BPM | RESPIRATION RATE: 16 BRPM

## 2022-06-15 DIAGNOSIS — R10.13 EPIGASTRIC PAIN: Primary | ICD-10-CM

## 2022-06-15 DIAGNOSIS — K80.50 BILIARY COLIC: ICD-10-CM

## 2022-06-15 PROCEDURE — 7100000011 HC PHASE II RECOVERY - ADDTL 15 MIN: Performed by: SURGERY

## 2022-06-15 PROCEDURE — 2580000003 HC RX 258: Performed by: SURGERY

## 2022-06-15 PROCEDURE — 2500000003 HC RX 250 WO HCPCS: Performed by: SURGERY

## 2022-06-15 PROCEDURE — 7100000001 HC PACU RECOVERY - ADDTL 15 MIN: Performed by: SURGERY

## 2022-06-15 PROCEDURE — 2500000003 HC RX 250 WO HCPCS: Performed by: NURSE ANESTHETIST, CERTIFIED REGISTERED

## 2022-06-15 PROCEDURE — 7100000010 HC PHASE II RECOVERY - FIRST 15 MIN: Performed by: SURGERY

## 2022-06-15 PROCEDURE — 6360000002 HC RX W HCPCS: Performed by: SURGERY

## 2022-06-15 PROCEDURE — 2720000010 HC SURG SUPPLY STERILE: Performed by: SURGERY

## 2022-06-15 PROCEDURE — 6370000000 HC RX 637 (ALT 250 FOR IP): Performed by: SURGERY

## 2022-06-15 PROCEDURE — 88304 TISSUE EXAM BY PATHOLOGIST: CPT

## 2022-06-15 PROCEDURE — 3600000004 HC SURGERY LEVEL 4 BASE: Performed by: SURGERY

## 2022-06-15 PROCEDURE — 7100000000 HC PACU RECOVERY - FIRST 15 MIN: Performed by: SURGERY

## 2022-06-15 PROCEDURE — 3700000001 HC ADD 15 MINUTES (ANESTHESIA): Performed by: SURGERY

## 2022-06-15 PROCEDURE — 3700000000 HC ANESTHESIA ATTENDED CARE: Performed by: SURGERY

## 2022-06-15 PROCEDURE — 3600000014 HC SURGERY LEVEL 4 ADDTL 15MIN: Performed by: SURGERY

## 2022-06-15 PROCEDURE — 99024 POSTOP FOLLOW-UP VISIT: CPT | Performed by: SURGERY

## 2022-06-15 PROCEDURE — 2709999900 HC NON-CHARGEABLE SUPPLY: Performed by: SURGERY

## 2022-06-15 PROCEDURE — 6360000002 HC RX W HCPCS

## 2022-06-15 PROCEDURE — 6360000002 HC RX W HCPCS: Performed by: NURSE ANESTHETIST, CERTIFIED REGISTERED

## 2022-06-15 PROCEDURE — 47562 LAPAROSCOPIC CHOLECYSTECTOMY: CPT | Performed by: SURGERY

## 2022-06-15 PROCEDURE — 6360000002 HC RX W HCPCS: Performed by: ANESTHESIOLOGY

## 2022-06-15 RX ORDER — LIDOCAINE HYDROCHLORIDE 20 MG/ML
INJECTION, SOLUTION INTRAVENOUS PRN
Status: DISCONTINUED | OUTPATIENT
Start: 2022-06-15 | End: 2022-06-15 | Stop reason: SDUPTHER

## 2022-06-15 RX ORDER — GLYCOPYRROLATE 0.2 MG/ML
INJECTION INTRAMUSCULAR; INTRAVENOUS PRN
Status: DISCONTINUED | OUTPATIENT
Start: 2022-06-15 | End: 2022-06-15 | Stop reason: SDUPTHER

## 2022-06-15 RX ORDER — MIDAZOLAM HYDROCHLORIDE 1 MG/ML
INJECTION INTRAMUSCULAR; INTRAVENOUS PRN
Status: DISCONTINUED | OUTPATIENT
Start: 2022-06-15 | End: 2022-06-15 | Stop reason: SDUPTHER

## 2022-06-15 RX ORDER — SODIUM CHLORIDE 0.9 % (FLUSH) 0.9 %
5-40 SYRINGE (ML) INJECTION EVERY 12 HOURS SCHEDULED
Status: DISCONTINUED | OUTPATIENT
Start: 2022-06-15 | End: 2022-06-15 | Stop reason: HOSPADM

## 2022-06-15 RX ORDER — NEOSTIGMINE METHYLSULFATE 1 MG/ML
INJECTION, SOLUTION INTRAVENOUS PRN
Status: DISCONTINUED | OUTPATIENT
Start: 2022-06-15 | End: 2022-06-15 | Stop reason: SDUPTHER

## 2022-06-15 RX ORDER — HYDROCODONE BITARTRATE AND ACETAMINOPHEN 5; 325 MG/1; MG/1
1 TABLET ORAL EVERY 6 HOURS PRN
Qty: 10 TABLET | Refills: 0 | Status: SHIPPED | OUTPATIENT
Start: 2022-06-15 | End: 2022-06-18

## 2022-06-15 RX ORDER — KETAMINE HYDROCHLORIDE 50 MG/ML
INJECTION, SOLUTION, CONCENTRATE INTRAMUSCULAR; INTRAVENOUS PRN
Status: DISCONTINUED | OUTPATIENT
Start: 2022-06-15 | End: 2022-06-15 | Stop reason: SDUPTHER

## 2022-06-15 RX ORDER — SODIUM CHLORIDE 9 MG/ML
INJECTION, SOLUTION INTRAVENOUS PRN
Status: DISCONTINUED | OUTPATIENT
Start: 2022-06-15 | End: 2022-06-15 | Stop reason: HOSPADM

## 2022-06-15 RX ORDER — OXYCODONE HYDROCHLORIDE 5 MG/1
10 TABLET ORAL PRN
Status: DISCONTINUED | OUTPATIENT
Start: 2022-06-15 | End: 2022-06-15 | Stop reason: HOSPADM

## 2022-06-15 RX ORDER — SODIUM CHLORIDE 0.9 % (FLUSH) 0.9 %
5-40 SYRINGE (ML) INJECTION PRN
Status: DISCONTINUED | OUTPATIENT
Start: 2022-06-15 | End: 2022-06-15 | Stop reason: SDUPTHER

## 2022-06-15 RX ORDER — PROPOFOL 10 MG/ML
INJECTION, EMULSION INTRAVENOUS PRN
Status: DISCONTINUED | OUTPATIENT
Start: 2022-06-15 | End: 2022-06-15 | Stop reason: SDUPTHER

## 2022-06-15 RX ORDER — ROCURONIUM BROMIDE 10 MG/ML
INJECTION, SOLUTION INTRAVENOUS PRN
Status: DISCONTINUED | OUTPATIENT
Start: 2022-06-15 | End: 2022-06-15 | Stop reason: SDUPTHER

## 2022-06-15 RX ORDER — ONDANSETRON 2 MG/ML
INJECTION INTRAMUSCULAR; INTRAVENOUS PRN
Status: DISCONTINUED | OUTPATIENT
Start: 2022-06-15 | End: 2022-06-15 | Stop reason: SDUPTHER

## 2022-06-15 RX ORDER — FENTANYL CITRATE 50 UG/ML
INJECTION, SOLUTION INTRAMUSCULAR; INTRAVENOUS PRN
Status: DISCONTINUED | OUTPATIENT
Start: 2022-06-15 | End: 2022-06-15 | Stop reason: SDUPTHER

## 2022-06-15 RX ORDER — DEXAMETHASONE SODIUM PHOSPHATE 10 MG/ML
INJECTION, SOLUTION INTRAMUSCULAR; INTRAVENOUS PRN
Status: DISCONTINUED | OUTPATIENT
Start: 2022-06-15 | End: 2022-06-15 | Stop reason: SDUPTHER

## 2022-06-15 RX ORDER — CEFAZOLIN SODIUM 2 G/50ML
2000 SOLUTION INTRAVENOUS
Status: COMPLETED | OUTPATIENT
Start: 2022-06-15 | End: 2022-06-15

## 2022-06-15 RX ORDER — BUPIVACAINE HYDROCHLORIDE AND EPINEPHRINE 2.5; 5 MG/ML; UG/ML
INJECTION, SOLUTION EPIDURAL; INFILTRATION; INTRACAUDAL; PERINEURAL PRN
Status: DISCONTINUED | OUTPATIENT
Start: 2022-06-15 | End: 2022-06-15 | Stop reason: ALTCHOICE

## 2022-06-15 RX ORDER — SODIUM CHLORIDE 0.9 % (FLUSH) 0.9 %
5-40 SYRINGE (ML) INJECTION PRN
Status: DISCONTINUED | OUTPATIENT
Start: 2022-06-15 | End: 2022-06-15 | Stop reason: HOSPADM

## 2022-06-15 RX ORDER — FENTANYL CITRATE 50 UG/ML
25 INJECTION, SOLUTION INTRAMUSCULAR; INTRAVENOUS EVERY 5 MIN PRN
Status: COMPLETED | OUTPATIENT
Start: 2022-06-15 | End: 2022-06-15

## 2022-06-15 RX ORDER — ONDANSETRON 2 MG/ML
4 INJECTION INTRAMUSCULAR; INTRAVENOUS ONCE
Status: COMPLETED | OUTPATIENT
Start: 2022-06-15 | End: 2022-06-15

## 2022-06-15 RX ORDER — OXYCODONE HYDROCHLORIDE 5 MG/1
5 TABLET ORAL PRN
Status: DISCONTINUED | OUTPATIENT
Start: 2022-06-15 | End: 2022-06-15 | Stop reason: HOSPADM

## 2022-06-15 RX ORDER — FENTANYL CITRATE 0.05 MG/ML
INJECTION, SOLUTION INTRAMUSCULAR; INTRAVENOUS
Status: COMPLETED
Start: 2022-06-15 | End: 2022-06-15

## 2022-06-15 RX ORDER — SODIUM CHLORIDE, SODIUM LACTATE, POTASSIUM CHLORIDE, CALCIUM CHLORIDE 600; 310; 30; 20 MG/100ML; MG/100ML; MG/100ML; MG/100ML
INJECTION, SOLUTION INTRAVENOUS CONTINUOUS
Status: DISCONTINUED | OUTPATIENT
Start: 2022-06-15 | End: 2022-06-15 | Stop reason: HOSPADM

## 2022-06-15 RX ORDER — ONDANSETRON 2 MG/ML
INJECTION INTRAMUSCULAR; INTRAVENOUS
Status: COMPLETED
Start: 2022-06-15 | End: 2022-06-15

## 2022-06-15 RX ORDER — IBUPROFEN 800 MG/1
800 TABLET ORAL EVERY 6 HOURS PRN
Qty: 20 TABLET | Refills: 0 | Status: SHIPPED | OUTPATIENT
Start: 2022-06-15

## 2022-06-15 RX ADMIN — FENTANYL CITRATE 25 MCG: 50 INJECTION INTRAMUSCULAR; INTRAVENOUS at 08:39

## 2022-06-15 RX ADMIN — LIDOCAINE HYDROCHLORIDE 50 MG: 20 INJECTION, SOLUTION INTRAVENOUS at 07:19

## 2022-06-15 RX ADMIN — PHENYLEPHRINE HYDROCHLORIDE 200 MCG: 10 INJECTION INTRAVENOUS at 07:24

## 2022-06-15 RX ADMIN — MIDAZOLAM 2 MG: 1 INJECTION INTRAMUSCULAR; INTRAVENOUS at 07:10

## 2022-06-15 RX ADMIN — FENTANYL CITRATE 25 MCG: 50 INJECTION INTRAMUSCULAR; INTRAVENOUS at 08:46

## 2022-06-15 RX ADMIN — ONDANSETRON 4 MG: 2 INJECTION INTRAMUSCULAR; INTRAVENOUS at 08:33

## 2022-06-15 RX ADMIN — ROCURONIUM BROMIDE 30 MG: 10 SOLUTION INTRAVENOUS at 07:19

## 2022-06-15 RX ADMIN — ONDANSETRON 4 MG: 2 INJECTION INTRAMUSCULAR; INTRAVENOUS at 07:27

## 2022-06-15 RX ADMIN — Medication 3 MG: at 07:51

## 2022-06-15 RX ADMIN — FENTANYL CITRATE 100 MCG: 50 INJECTION, SOLUTION INTRAMUSCULAR; INTRAVENOUS at 07:19

## 2022-06-15 RX ADMIN — KETAMINE HYDROCHLORIDE 20 MG: 50 INJECTION INTRAMUSCULAR; INTRAVENOUS at 07:19

## 2022-06-15 RX ADMIN — CEFAZOLIN SODIUM 2000 MG: 2 SOLUTION INTRAVENOUS at 07:13

## 2022-06-15 RX ADMIN — PROPOFOL 30 MG: 10 INJECTION, EMULSION INTRAVENOUS at 07:29

## 2022-06-15 RX ADMIN — PROPOFOL 150 MG: 10 INJECTION, EMULSION INTRAVENOUS at 07:19

## 2022-06-15 RX ADMIN — PHENYLEPHRINE HYDROCHLORIDE 100 MCG: 10 INJECTION INTRAVENOUS at 07:44

## 2022-06-15 RX ADMIN — SODIUM CHLORIDE, POTASSIUM CHLORIDE, SODIUM LACTATE AND CALCIUM CHLORIDE: 600; 310; 30; 20 INJECTION, SOLUTION INTRAVENOUS at 06:14

## 2022-06-15 RX ADMIN — DEXAMETHASONE SODIUM PHOSPHATE 10 MG: 10 INJECTION, SOLUTION INTRAMUSCULAR; INTRAVENOUS at 07:27

## 2022-06-15 RX ADMIN — GLYCOPYRROLATE 0.6 MG: 0.2 INJECTION INTRAMUSCULAR; INTRAVENOUS at 07:51

## 2022-06-15 ASSESSMENT — PAIN DESCRIPTION - ONSET: ONSET: ON-GOING

## 2022-06-15 ASSESSMENT — PAIN DESCRIPTION - PAIN TYPE: TYPE: SURGICAL PAIN

## 2022-06-15 ASSESSMENT — PAIN DESCRIPTION - DESCRIPTORS
DESCRIPTORS: SHARP
DESCRIPTORS: SHARP
DESCRIPTORS: ACHING;SHARP

## 2022-06-15 ASSESSMENT — PAIN - FUNCTIONAL ASSESSMENT
PAIN_FUNCTIONAL_ASSESSMENT: 0-10
PAIN_FUNCTIONAL_ASSESSMENT: PREVENTS OR INTERFERES SOME ACTIVE ACTIVITIES AND ADLS

## 2022-06-15 ASSESSMENT — LIFESTYLE VARIABLES: SMOKING_STATUS: 0

## 2022-06-15 ASSESSMENT — PAIN SCALES - GENERAL
PAINLEVEL_OUTOF10: 6
PAINLEVEL_OUTOF10: 6
PAINLEVEL_OUTOF10: 7
PAINLEVEL_OUTOF10: 6
PAINLEVEL_OUTOF10: 8

## 2022-06-15 ASSESSMENT — PAIN DESCRIPTION - LOCATION
LOCATION: ABDOMEN

## 2022-06-15 ASSESSMENT — PAIN DESCRIPTION - FREQUENCY: FREQUENCY: CONTINUOUS

## 2022-06-15 ASSESSMENT — PAIN DESCRIPTION - ORIENTATION: ORIENTATION: MID;UPPER

## 2022-06-15 NOTE — H&P
General Surgery History and Physical     Patient's Name/Date of Birth: Diana Rodriges / 1960     Date: 6/15/2022      Surgeon: Luiz Zee M.D.     PCP: Kesha Vick DO     Chief Complaint: biliary colic     HPI:   Diana Rodriges is a 63 y.o. female who presents for evaluation of biliary colic.  Timing is intermittent, radiation to back, alleviated by npo and started several weeks ago        Past Medical History        Past Medical History:   Diagnosis Date    Asthma       controlled with inhalers     BRCA1 negative       Patient thinks she had the genetic testing but don't know the results     Chronic pain      Chronic rhinitis     CRPS (complex regional pain syndrome), lower limb       left foot    Hyperlipidemia      Raynauds syndrome     Right foot pain       for OR 20     RSD lower limb       left foot    Thyroid disease      Tinnitus              Past Surgical History         Past Surgical History:   Procedure Laterality Date    BREAST SURGERY   2009     lump left breast    BUNIONECTOMY        left     SECTION   ,     ENDOMETRIAL ABLATION       ESOPHAGUS SURGERY        due to gerd    FOOT SURGERY Right 2020     TARSAL TUNNEL RELEASE RIGHT FOOT performed by Charmaine Burton DPM at [address] [place] [address]   12     paravertebral sympathetic left side #1    NERVE BLOCK   12     paravert sympathetic left    NERVE BLOCK   2012     left paravertebral sympathetic #3    NERVE BLOCK   10-01-12     left paravertebral sympathetic  #4    NERVE BLOCK   10-10-12     paravertebral sympathetic left #5    NERVE BLOCK   10/24/12     left sympathetic    NERVE BLOCK   12     paravert sympathetic block left #7    NERVE BLOCK   12     left paravertebral sympathetic left #8    NERVE BLOCK Left 14     lumbar sympathetic #1    NERVE BLOCK   14     paravertebral sympathetic left #2    NERVE BLOCK Left 8/6/14     PARAVERTEBRAL SUMPATHETIV BLOCK #3    NERVE BLOCK Left 9/5/2014     left paravertebral sympathetic #5    NERVE BLOCK Left 09/17/14     left paravertebral sympathetic nerve block #6 9/17/14    NERVE BLOCK Left 10 8 14     paravert sympathetic #7    NERVE BLOCK N/A 07/06/2016     sympathetic #1    NERVE BLOCK   07/13/2016     right parasympathic nerve block lumbar #2    NERVE BLOCK Right 07/20/2016     paravertebral sympathetic right #3    NERVE BLOCK Right 08/10/2016     lumbar parasympathetic block #4    NERVE BLOCK Right 08/17/2016     paravertebral sympathetic right #5    NERVE BLOCK Right 08/24/2016     paravertebral sympathetic right #6    OTHER SURGICAL HISTORY   03/27/2017     laparscopic incisional hernia repair with mesh    TUBAL LIGATION   1982    UPPER GASTROINTESTINAL ENDOSCOPY N/A 5/3/2022     EGD BIOPSY performed by Emma Redd MD at [address] [place] [address]     Current Facility-Administered Medications          Current Outpatient Medications   Medication Sig Dispense Refill    MOVANTIK 25 MG TABS tablet TAKE ONE TABLET BY MOUTH EVERY MORNING 90 tablet 0    pantoprazole (PROTONIX) 40 MG tablet TAKE ONE TABLET BY MOUTH EVERY DAY 90 tablet 0    celecoxib (CELEBREX) 50 MG capsule Take 1 capsule by mouth 2 times daily 60 capsule 3    topiramate (TOPAMAX) 25 MG tablet Take 1 tablet by mouth nightly 30 tablet 1    budesonide-formoterol (SYMBICORT) 160-4.5 MCG/ACT AERO INHALE TWO PUFFS BY MOUTH TWO TIMES A DAY  RINSE MOUTH AFTER USE        levothyroxine (SYNTHROID) 50 MCG tablet TAKE ONE TABLET BY MOUTH EVERY DAY 90 tablet 1    levalbuterol (XOPENEX HFA) 45 MCG/ACT inhaler INHALE ONE PUFF BY MOUTH EVERY 4 HOURS AS NEEDED FOR WHEEZING 15 g 2    Folinic Acid-Vit B6-Vit B12 (FOLINIC-PLUS) 4-50-2 MG TABS TAKE ONE TABLET BY MOUTH EVERY DAY 90 tablet 0    DULoxetine (CYMBALTA) 20 MG extended release capsule TAKE ONE CAPSULE BY MOUTH DAILY 90 capsule 0    Calcium Carb-Cholecalciferol (CALCIUM 1000 + D) 1000-800 MG-UNIT TABS Take 1 tablet by mouth daily 90 tablet 1    atorvastatin (LIPITOR) 40 MG tablet Take 0.5 tablets by mouth daily (Patient taking differently: Take 40 mg by mouth daily Takes 40mg qd) 90 tablet 1    albuterol sulfate HFA (VENTOLIN HFA) 108 (90 Base) MCG/ACT inhaler Inhale 2 puffs into the lungs every 6 hours as needed for Wheezing 18 g 2    albuterol (PROVENTIL) (2.5 MG/3ML) 0.083% nebulizer solution INHALE 1/2 VIAL VIA NEBULIZER FOUR TIMES A DAY AS NEEDED FOR COUGH 120 each 0    azelastine (ASTELIN) 0.1 % nasal spray USE 2 SPRAYS IN EACH NOSTRIL TWICE DAILY AS DIRECTED 30 mL 2    NIFEdipine (PROCARDIA) 10 MG capsule TAKE ONE CAPSULE BY MOUTH TWO TIMES A DAY   5    SPIRIVA RESPIMAT 2.5 MCG/ACT AERS inhaler INHALE TWO PUFFS BY MOUTH once EVERY DAY   5    vitamin B-12 (CYANOCOBALAMIN) 100 MCG tablet Take 50 mcg by mouth daily.          vitamin E 400 UNIT capsule Take 400 Units by mouth daily.            No current facility-administered medications for this visit.            No Known Allergies     The patient has a family history that is negative for severe cardiovascular or respiratory issues, negative for reaction to anesthesia.     Social History               Socioeconomic History    Marital status: Single       Spouse name: Not on file    Number of children: 2    Years of education: Not on file    Highest education level: Not on file   Occupational History    Occupation: [employer]       Employer: [employer]   Tobacco Use    Smoking status: Former Smoker       Packs/day: 0.50       Years: 30.00       Pack years: 15.00       Types: Cigarettes       Quit date: 2017       Years since quittin.3    Smokeless tobacco: Never Used   Vaping Use    Vaping Use: Never used   Substance and Sexual Activity    Alcohol use:  Yes       Alcohol/week: 2.0 standard drinks       Types: 2 Cans of beer per week       Comment: occasional    Drug use: No    Sexual activity: Not on file   Other Topics Concern    Not on file   Social History Narrative    Not on file      Social Determinants of Health          Financial Resource Strain: Low Risk     Difficulty of Paying Living Expenses: Not hard at all   Food Insecurity: No Food Insecurity    Worried About Running Out of Food in the Last Year: Never true    [address] in the Last Year: Never true   Transportation Needs:     Lack of Transportation (Medical): Not on file    Lack of Transportation (Non-Medical):  Not on file   Physical Activity:     Days of Exercise per Week: Not on file    Minutes of Exercise per Session: Not on file   Stress:     Feeling of Stress : Not on file   Social Connections:     Frequency of Communication with Friends and Family: Not on file    Frequency of Social Gatherings with Friends and Family: Not on file    Attends [employer] Services: Not on file    Active Member of [address] [place] [address] or Organizations: Not on file    Attends Club or Organization Meetings: Not on file    Marital Status: Not on file   Intimate Partner Violence:     Fear of Current or Ex-Partner: Not on file    Emotionally Abused: Not on file    Physically Abused: Not on file    Sexually Abused: Not on file   Housing Stability:     Unable to Pay for Housing in the Last Year: Not on file    Number of [place] in the Last Year: Not on file    Unstable Housing in the Last Year: Not on file                  Review of Systems  Review of Systems -  General ROS: negative for - chills, fatigue or malaise  ENT ROS: negative for - hearing change, nasal congestion or nasal discharge  Allergy and Immunology ROS: negative for - hives, itchy/watery eyes or nasal congestion  Hematological and Lymphatic ROS: negative for - blood clots, blood transfusions, bruising or fatigue  Endocrine ROS: negative for - malaise/lethargy, mood swings, palpitations or polydipsia/polyuria  Breast ROS: negative for - new or changing breast lumps or nipple changes  Respiratory ROS: negative for - sputum changes, stridor, tachypnea or wheezing  Cardiovascular ROS: negative for - irregular heartbeat, loss of consciousness, murmur or orthopnea  Gastrointestinal ROS: negative for - constipation, diarrhea, gas/bloating, heartburn or hematemesis, pain and nausea  Genito-Urinary ROS: negative for -  genital discharge, genital ulcers or hematuria  Musculoskeletal ROS: negative for - gait disturbance, muscle pain or muscular weakness     Physical exam:   /86 (Site: Left Upper Arm, Position: Sitting, Cuff Size: Medium Adult)   Pulse (!) 115   Temp 99.1 °F (37.3 °C)   Ht 5' 4\" (1.626 m)   Wt 126 lb (57.2 kg)   BMI 21.63 kg/m²   General appearance:  NAD  Pyscho/social: negative for tremors and hallucinations  Head: NCAT, PERRLA, EOMI, red conjunctiva  Neck: supple, no masses  Lungs: CTAB, equal chest rise bilateral  Heart: Reg rate  Abdomen: soft, nontender, nondistended  Skin; no lesions  Gu: no cva tenderness  Extremities: extremities normal, atraumatic, no cyanosis or edema     Assessment:  Biliary colic      Plan: To OR  Discussed the risk, benefits and alternatives of surgery including wound infections, bleeding, scar and hernia formation and the risks of general anesthetic including MI, CVA, sudden death or reactions to anesthetic medications, they understand ductal injury and its consequences, liver vascular injury and its consequences and other near organ injury. . The patient understands the risks and alternatives and the possibility of converting to an open procedure. All questions were answered to the patient's satisfaction and they freely signed the consent. We discussed with a normal gb workup but classic symptoms of gallbladder disease the chance of symptomatic resolution is 85% and there is a chance of no changes of symptoms post op.  They understand and wish to

## 2022-06-15 NOTE — ANESTHESIA POSTPROCEDURE EVALUATION
Department of Anesthesiology  Postprocedure Note    Patient: Santhosh Weir  MRN: [id number]  YOB: 1960  Date of evaluation: 6/15/2022  Time:  10:18 AM     Procedure Summary     Date: 06/15/22 Room / Location: [address] [place] [address] / [address] [facility]    Anesthesia Start: 0710 Anesthesia Stop: 0805    Procedure: [address] (N/A Abdomen) Diagnosis:       Biliary colic      (Biliary colic [V49.19])    Surgeons: Chris More MD Responsible Provider: Juventino Barrios DO    Anesthesia Type: general ASA Status: 3          Anesthesia Type: No value filed. Wendy Phase I: Wendy Score: 10    Wendy Phase II:      Last vitals: Reviewed and per EMR flowsheets.        Anesthesia Post Evaluation    Patient location during evaluation: PACU  Patient participation: complete - patient participated  Level of consciousness: awake and alert  Airway patency: patent  Nausea & Vomiting: no nausea and no vomiting  Complications: no  Cardiovascular status: hemodynamically stable  Respiratory status: acceptable  Hydration status: euvolemic

## 2022-06-15 NOTE — ANESTHESIA PRE PROCEDURE
Department of Anesthesiology  Preprocedure Note       Name:  Adolfo Rosales   Age:  63 y.o.  :  1960                                          MRN:  [id number]         Date:  6/15/2022      Surgeon: Benson Gorman):  Sara Preston MD    Procedure: Procedure(s):  LAPAROSCOPIC CHOLECYSTECTOMY    Medications prior to admission:   Prior to Admission medications    Medication Sig Start Date End Date Taking?  Authorizing Provider   FOLBEE 2.5-25-1 MG TABS tablet TAKE ONE TABLET BY MOUTH EVERY DAY 22   Historical Provider, MD   DULoxetine (CYMBALTA) 20 MG extended release capsule TAKE ONE CAPSULE BY MOUTH DAILY 22   Quin Robledo,    Folinic Acid-Vit B6-Vit B12 (FOLINIC-PLUS) 4-50-2 MG TABS TAKE ONE TABLET BY MOUTH EVERY DAY 22   [facility] ZELDA Robledo, DO   levalbuterol (XOPENEX HFA) 45 MCG/ACT inhaler INHALE 1 PUFF BY MOUTH EVERY 4 HOURS AS NEEDED FOR WHEEZING 22   Robbie Robledo,    MOVANTIK 25 MG TABS tablet TAKE ONE TABLET BY MOUTH EVERY MORNING 5/3/22   Robbie Robledo,    pantoprazole (PROTONIX) 40 MG tablet TAKE ONE TABLET BY MOUTH EVERY DAY 5/3/22   Quin Robledo DO   celecoxib (CELEBREX) 50 MG capsule Take 1 capsule by mouth 2 times daily  Patient taking differently: Take 50 mg by mouth 2 times daily Not taking 22   Arnulfo Lagunas,    topiramate (TOPAMAX) 25 MG tablet Take 1 tablet by mouth nightly 22   JUNIOR Armas   budesonide-formoterol (SYMBICORT) 160-4.5 MCG/ACT AERO INHALE TWO PUFFS BY MOUTH TWO TIMES A DAY  RINSE MOUTH AFTER USE 3/13/22   Historical Provider, MD   levothyroxine (SYNTHROID) 50 MCG tablet TAKE ONE TABLET BY MOUTH EVERY DAY 22   [facility] ZELDA Robledo, DO   Calcium Carb-Cholecalciferol (CALCIUM 1000 + D) 1000-800 MG-UNIT TABS Take 1 tablet by mouth daily 22   [facility] ZELDA Robledo DO   atorvastatin (LIPITOR) 40 MG tablet Take 0.5 tablets by mouth daily  Patient taking differently: Take 40 mg by mouth daily Takes 40mg qd 1/11/22   Robbie Robledo DO   albuterol sulfate HFA (VENTOLIN HFA) 108 (90 Base) MCG/ACT inhaler Inhale 2 puffs into the lungs every 6 hours as needed for Wheezing 1/11/22   Robbie Rolbedo DO   albuterol (PROVENTIL) (2.5 MG/3ML) 0.083% nebulizer solution INHALE 1/2 VIAL VIA NEBULIZER FOUR TIMES A DAY AS NEEDED FOR COUGH 5/26/21   Robbie Robledo DO   azelastine (ASTELIN) 0.1 % nasal spray USE 2 SPRAYS IN EACH NOSTRIL TWICE DAILY AS DIRECTED 9/23/19   Avi Shelby DO   NIFEdipine (PROCARDIA) 10 MG capsule TAKE ONE CAPSULE BY MOUTH TWO TIMES A DAY 8/28/19   Historical Provider, MD   SPIRIVA RESPIMAT 2.5 MCG/ACT AERS inhaler INHALE TWO PUFFS BY MOUTH once EVERY DAY 4/11/18   Historical Provider, MD   vitamin B-12 (CYANOCOBALAMIN) 100 MCG tablet Take 50 mcg by mouth daily. Historical Provider, MD   vitamin E 400 UNIT capsule Take 400 Units by mouth daily. Historical Provider, MD       Current medications:    Current Facility-Administered Medications   Medication Dose Route Frequency Provider Last Rate Last Admin    lactated ringers infusion   IntraVENous Continuous Lindsay Wellington MD        0.9 % sodium chloride infusion   IntraVENous PRN Ousmane Wang MD        ceFAZolin (ANCEF) 2000 mg in dextrose 3 % 50 mL IVPB (duplex)  2,000 mg IntraVENous On Call to [address] [place] [address], MD        lactated ringers infusion   IntraVENous Continuous Ousmane Wang MD        sodium chloride flush 0.9 % injection 5-40 mL  5-40 mL IntraVENous 2 times per day Ousmane Wang MD        sodium chloride flush 0.9 % injection 5-40 mL  5-40 mL IntraVENous PRN Ousmane Wang MD           Allergies:  No Known Allergies    Problem List:    Patient Active Problem List   Diagnosis Code    CRPS of the left foot. G90.529    Peripheral neuropathy of the left foot. G62.9    Status post bunionectomy Z98.890    chronic neuropathic pain syndrome.  M79.2    Thyroid nodule E04.1    Unilateral hearing loss H91.90    RSD lower limb G90.529    Chronic pain syndrome G89.4    Anemia D64.9    Underweight R63.6    Dependence on nicotine from cigarettes F17.210    Acute respiratory failure with hypoxia ([facility]) J96.01    COPD exacerbation ([facility]) J44.1    Asthma J45.909    Elevated LFTs R79.89    Dyslipidemia E78.5    Abnormal laboratory test result R89.9    Epigastric pain R10.13       Past Medical History:        Diagnosis Date    Asthma     controlled with inhalers     BRCA1 negative     Patient thinks she had the genetic testing but don't know the results     Chronic pain     Chronic rhinitis     CRPS (complex regional pain syndrome), lower limb     left foot    Hyperlipidemia     Raynauds syndrome     Right foot pain     for OR 20     RSD lower limb     left foot    Thyroid disease     Tinnitus        Past Surgical History:        Procedure Laterality Date    BREAST SURGERY  2009    lump left breast    BUNIONECTOMY  2011    left     SECTION  ,     ENDOMETRIAL ABLATION      ESOPHAGUS SURGERY      due to gerd    FOOT SURGERY Right 2020    TARSAL TUNNEL RELEASE RIGHT FOOT performed by Guillermina Avendaño DPM at [place] [address]  12    paravertebral sympathetic left side #1    NERVE BLOCK  12    paravert sympathetic left    NERVE BLOCK  2012    left paravertebral sympathetic #3    NERVE BLOCK  10-01-12    left paravertebral sympathetic  #4    NERVE BLOCK  10-10-12    paravertebral sympathetic left #5    NERVE BLOCK  10/24/12    left sympathetic    NERVE BLOCK  12    paravert sympathetic block left #7    NERVE BLOCK  12    left paravertebral sympathetic left #8    NERVE BLOCK Left 14    lumbar sympathetic #1    NERVE BLOCK  14    paravertebral sympathetic left #2    NERVE BLOCK Left 14    PARAVERTEBRAL SUMPATHETIV BLOCK #3    NERVE BLOCK Left 2014    left paravertebral sympathetic #5    NERVE BLOCK Left 14    left paravertebral sympathetic nerve block #6 14    NERVE BLOCK Left 10 8 14    paravert sympathetic #7    NERVE BLOCK N/A 2016    sympathetic #1    NERVE BLOCK  2016    right parasympathic nerve block lumbar #2    NERVE BLOCK Right 2016    paravertebral sympathetic right #3    NERVE BLOCK Right 08/10/2016    lumbar parasympathetic block #4    NERVE BLOCK Right 2016    paravertebral sympathetic right #5    NERVE BLOCK Right 2016    paravertebral sympathetic right #6    OTHER SURGICAL HISTORY  2017    laparscopic incisional hernia repair with mesh    TUBAL LIGATION  1982    UPPER GASTROINTESTINAL ENDOSCOPY N/A 5/3/2022    EGD BIOPSY performed by Radha Perez MD at [facility] History:    Social History     Tobacco Use    Smoking status: Former Smoker     Packs/day: 0.50     Years: 30.00     Pack years: 15.00     Types: Cigarettes     Quit date: 2017     Years since quittin.3    Smokeless tobacco: Never Used   Substance Use Topics    Alcohol use:  Yes     Alcohol/week: 2.0 standard drinks     Types: 2 Cans of beer per week     Comment: occasional                                Counseling given: Not Answered      Vital Signs (Current):   Vitals:    22 0920 06/15/22 0547   BP:  109/67   Pulse:  92   Resp:  16   Temp:  36.2 °C (97.2 °F)   TempSrc:  Infrared   SpO2:  93%   Weight: 125 lb (56.7 kg) 125 lb (56.7 kg)   Height: 5' 4\" (1.626 m) 5' 4\" (1.626 m)                                              BP Readings from Last 3 Encounters:   06/15/22 109/67   22 118/76   22 125/86       NPO Status: Time of last liquid consumption: 2300                        Time of last solid consumption:                         Date of last liquid consumption: 22                        Date of last solid food consumption: 22    BMI:   Wt Readings from Last 3 Encounters:   06/15/22 125 lb (56.7 kg)   06/13/22 125 lb 8 oz (56.9 kg)   05/26/22 126 lb (57.2 kg)     Body mass index is 21.46 kg/m². CBC:   Lab Results   Component Value Date    WBC 5.4 04/18/2022    RBC 4.17 04/18/2022    HGB 13.1 04/18/2022    HCT 40.6 04/18/2022    MCV 97.4 04/18/2022    RDW 13.0 04/18/2022     04/18/2022       CMP:   Lab Results   Component Value Date     04/18/2022    K 3.9 04/18/2022     04/18/2022    CO2 24 04/18/2022    BUN 18 05/20/2022    CREATININE 0.9 05/20/2022    GFRAA >60 05/20/2022    LABGLOM >60 05/20/2022    GLUCOSE 113 04/18/2022    GLUCOSE 91 10/17/2011    PROT 6.8 04/20/2022    CALCIUM 9.4 04/18/2022    BILITOT 0.3 04/18/2022    ALKPHOS 107 04/18/2022    AST 34 04/18/2022    ALT 48 04/18/2022       POC Tests: No results for input(s): POCGLU, POCNA, POCK, POCCL, POCBUN, POCHEMO, POCHCT in the last 72 hours. Coags:   Lab Results   Component Value Date    PROTIME 11.7 01/18/2018    INR 1.0 01/18/2018    APTT 23.5 01/18/2018       HCG (If Applicable):   Lab Results   Component Value Date    PREGTESTUR NEGATIVE 03/20/2017        ABGs: No results found for: PHART, PO2ART, BSK7FSZ, DKX0GMD, BEART, W4INOSIY     Type & Screen (If Applicable):  No results found for: LABABO, LABRH    Drug/Infectious Status (If Applicable):  No results found for: HIV, HEPCAB    COVID-19 Screening (If Applicable):   Lab Results   Component Value Date    COVID19 Not-Detected 01/11/2022    COVID19 Not Detected 12/30/2020       CT abd: 05/2022      Impression   No CT evidence of acute intra-or pelvic abnormality.  No stones in the   gallbladder intrahepatic or extrahepatic biliary ductal dilatation.             US carotid: 05/2021  Impression   1. The right internal carotid artery demonstrates 50-69% stenosis. Atherosclerosis in the mid right common carotid artery visually also results   in 50-69% stenosis, however is without associated velocity increase.    2. The left internal carotid artery demonstrates 0-50% stenosis. 3. Bilateral vertebral arteries are patent with flow in the normal direction. [id number]  Narrative & Impression    Normal sinus rhythm  When compared with ECG of 2021 11:42,  No significant change was found  Confirmed by Mo Agrawal ([id number]) on 2022 10:44:19 PM         Anesthesia Evaluation  Patient summary reviewed and Nursing notes reviewed no history of anesthetic complications:   Airway: Mallampati: II  TM distance: >3 FB   Neck ROM: full  Mouth opening: > = 3 FB   Dental:          Pulmonary: breath sounds clear to auscultation  (+) COPD (uses inhaler regularly ):  asthma:     (-) not a current smoker          Patient did not smoke on day of surgery. Cardiovascular:  Exercise tolerance: good (>4 METS),   (+) hypertension:, hyperlipidemia      ECG reviewed  Rhythm: regular  Rate: normal  Echocardiogram reviewed                  Neuro/Psych:   (+) neuromuscular disease:,              ROS comment: CRPS of the left foot. Peripheral neuropathy of the left foot. chronic neuropathic pain syndrome. GI/Hepatic/Renal:             Endo/Other:    (+) hypothyroidism::., .                 Abdominal:             Vascular: negative vascular ROS. Other Findings:           Anesthesia Plan      general     ASA 3     (#20 right hand)  Induction: intravenous. MIPS: Postoperative opioids intended, Prophylactic antiemetics administered and Postoperative trial extubation. Anesthetic plan and risks discussed with patient. Use of blood products discussed with patient whom consented to blood products. Plan discussed with CRNA and attending. Attending anesthesiologist reviewed and agrees with Preprocedure content                [address], [place]   6/15/2022        Patient seen and evaluated, anesthetic plan reviewed with patient and Anesthesiologist, Dr. Ranjit Rodriguez.    GINA Abbasi - CRNA\

## 2022-06-15 NOTE — OP NOTE
DATE OF PROCEDURE:  6/15/2022      SURGEON:  Jose Carson M.D.      ASSISTANT:  savita      PREOPERATIVE DIAGNOSIS:  Biliary colic. POSTOPERATIVE DIAGNOSIS:  same      OPERATION:  Laparoscopic cholecystectomy. ANESTHESIA:  General.      ESTIMATED BLOOD LOSS:minimal      COMPLICATIONS:  None. FLUIDS:  Crystalloid. SPECIMEN:  Gallbladder. DISPOSITION:  To  home. INDICATION:  Mark Shabazz is a 63 y.o. female who presented     for evaluation of right upper quadrant abdominal pain consistent with  colic. We explained the risks, benefits, potential outcomes, and   alternatives of treatment to the aforementioned procedure, including risk of   potential biliary leak or bile duct injury requiring further surgeries, infection or   bleeding requiring procedure or surgeries. she agreed to proceed   understanding those risks and potential outcomes. PROCEDURE:  The patient was brought into the operative suite and was given   preoperative antibiotics 30 minutes before incision, was placed under general   anesthesia, and then was prepped and draped in normal sterile condition. Once this was done, a 5-mm incision was made supraumbilically and a Veress   needle was passed through this incision into the peritoneum. Once this was done, CO2 was used to insufflate the abdomen to a pressure of 15 mmHg. At that time, the Veress needle was removed and replaced with a 5-mm trocar. The laparoscope was placed through that trocar and port, and once this was done, under direct visualization with   the laparoscope, an additional  10-mm port was placed in the subxiphoid area. Two right lateral abdominal ports were placed, 5 mm in size, under direct   visualization with the laparoscope. Once this was done, the gallbladder was retracted cephaladly with blunt   graspers. Blunt dissection as well as electrocautery were able to free the   gallbladder and expose the cystic duct and artery.   These were taken with   ligamax clip appliers, 2 distally and 1 proximally and then ligated with   Endoshears. Electrocautery then was able to remove the gallbladder from   liver bed. Any bleeding was electrocauterized for hemostasis. The abdomen   was  then suction irrigated until the aspirate returned clear and the   gallbladder was removed previous to this with the bag. It was sent for   specimen at that time. The 10-mm abdominal incision and defect in the fascia was closed in a   figure-of-8 fashion with 0 Vicryl suture. Once this was done, the skin was   approximated with 4-0 Monocryl suture in a subcuticular fashion. The patient   was then woken up in stable and taken to PACU.     Ulysses Silva, MD  7:43 AM  6/15/2022

## 2022-06-21 RX ORDER — TOPIRAMATE 25 MG/1
TABLET ORAL
Qty: 30 TABLET | Refills: 0 | Status: SHIPPED
Start: 2022-06-21 | End: 2022-07-26

## 2022-06-30 ENCOUNTER — OFFICE VISIT (OUTPATIENT)
Dept: SURGERY | Age: 62
End: 2022-06-30

## 2022-06-30 VITALS
BODY MASS INDEX: 21.34 KG/M2 | SYSTOLIC BLOOD PRESSURE: 136 MMHG | HEART RATE: 100 BPM | TEMPERATURE: 98.2 F | DIASTOLIC BLOOD PRESSURE: 85 MMHG | WEIGHT: 125 LBS | HEIGHT: 64 IN

## 2022-06-30 DIAGNOSIS — K80.50 BILIARY COLIC: Primary | ICD-10-CM

## 2022-06-30 PROCEDURE — 99024 POSTOP FOLLOW-UP VISIT: CPT | Performed by: SURGERY

## 2022-06-30 NOTE — LETTER
[facility]  [address] [address]  Phone: [phone]  Fax: [phone]    Tomeka Lindquist MD        June 30, 2022     Patient: Jono Barber   YOB: 1960   Date of Visit: 6/30/2022       To Whom it May Concern:    Vijaya Rhoades was seen in my clinic on 6/30/2022. She may return to work on 7/14/2022 without restrictions. If you have any questions or concerns, please don't hesitate to call.     Sincerely,         Tomeka Lindquist MD

## 2022-06-30 NOTE — PROGRESS NOTES
Surgery Progress Note            Chief complaint:   Patient Active Problem List   Diagnosis    CRPS of the left foot.  Peripheral neuropathy of the left foot.  Status post bunionectomy    chronic neuropathic pain syndrome.  Thyroid nodule    Unilateral hearing loss    RSD lower limb    Chronic pain syndrome    Anemia    Underweight    Dependence on nicotine from cigarettes    Acute respiratory failure with hypoxia (HCC)    COPD exacerbation (HCC)    Asthma    Elevated LFTs    Dyslipidemia    Abnormal laboratory test result    Epigastric pain    Biliary colic       S: doing well    O:   Vitals:    06/30/22 0941   BP: 136/85   Pulse: 100   Temp: 98.2 °F (36.8 °C)     No intake or output data in the 24 hours ending 06/30/22 0945        Labs:  No results for input(s): WBC, HGB, HCT in the last 72 hours. Invalid input(s): PLR  Lab Results   Component Value Date    CREATININE 0.9 05/20/2022    BUN 18 05/20/2022     04/18/2022    K 3.9 04/18/2022     04/18/2022    CO2 24 04/18/2022     No results for input(s): LIPASE, AMYLASE in the last 72 hours. Physical exam:   /85 (Site: Right Upper Arm, Position: Sitting, Cuff Size: Medium Adult)   Pulse 100   Temp 98.2 °F (36.8 °C)   Ht 5' 4\" (1.626 m)   Wt 125 lb (56.7 kg)   BMI 21.46 kg/m²   General appearance: NAD  Head: NCAT  Neck: supple, no masses  Lungs: equal chest rise bilateral  Heart: S1S2 present  Abdomen: soft, nontender, nondistended  Skin; no new lesions, incisions clean and intact  Gu: no cva tenderness  Extremities: extremities normal, atraumatic, no cyanosis or edema    A:  Post op lap starr    P: follow up as needed.      Onur Wade MD, MD  6/30/2022

## 2022-07-07 DIAGNOSIS — J44.9 CHRONIC OBSTRUCTIVE PULMONARY DISEASE, UNSPECIFIED COPD TYPE (HCC): ICD-10-CM

## 2022-07-07 RX ORDER — LEVALBUTEROL TARTRATE 45 UG/1
AEROSOL, METERED ORAL
Qty: 15 G | Refills: 0 | Status: SHIPPED
Start: 2022-07-07 | End: 2022-08-09

## 2022-07-08 ENCOUNTER — OFFICE VISIT (OUTPATIENT)
Dept: NEUROLOGY | Age: 62
End: 2022-07-08
Payer: COMMERCIAL

## 2022-07-08 VITALS
DIASTOLIC BLOOD PRESSURE: 83 MMHG | TEMPERATURE: 97.3 F | SYSTOLIC BLOOD PRESSURE: 140 MMHG | OXYGEN SATURATION: 92 % | HEART RATE: 67 BPM

## 2022-07-08 DIAGNOSIS — M79.604 PAIN IN BOTH LOWER EXTREMITIES: ICD-10-CM

## 2022-07-08 DIAGNOSIS — R20.0 NUMBNESS AND TINGLING OF BOTH LEGS: ICD-10-CM

## 2022-07-08 DIAGNOSIS — G43.109 VERTIGINOUS MIGRAINE: Primary | ICD-10-CM

## 2022-07-08 DIAGNOSIS — M79.605 PAIN IN BOTH LOWER EXTREMITIES: ICD-10-CM

## 2022-07-08 DIAGNOSIS — R20.2 NUMBNESS AND TINGLING OF BOTH LEGS: ICD-10-CM

## 2022-07-08 PROCEDURE — 99214 OFFICE O/P EST MOD 30 MIN: CPT | Performed by: PHYSICIAN ASSISTANT
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
## 2022-07-22 DIAGNOSIS — R20.2 NUMBNESS AND TINGLING OF BOTH LEGS: ICD-10-CM

## 2022-07-22 DIAGNOSIS — M79.605 PAIN IN BOTH LOWER EXTREMITIES: ICD-10-CM

## 2022-07-22 DIAGNOSIS — R20.0 NUMBNESS AND TINGLING OF BOTH LEGS: ICD-10-CM

## 2022-07-22 DIAGNOSIS — M79.604 PAIN IN BOTH LOWER EXTREMITIES: ICD-10-CM

## 2022-07-22 LAB
LACTATE DEHYDROGENASE: 227 U/L (ref 135–214)
TOTAL CK: 132 U/L (ref 20–180)

## 2022-07-24 DIAGNOSIS — E78.2 MIXED HYPERLIPIDEMIA: ICD-10-CM

## 2022-07-24 LAB — ALDOLASE: 4.5 U/L (ref 1.2–7.6)

## 2022-07-25 RX ORDER — ATORVASTATIN CALCIUM 40 MG/1
TABLET, FILM COATED ORAL
Qty: 90 TABLET | Refills: 0 | Status: SHIPPED | OUTPATIENT
Start: 2022-07-25

## 2022-07-26 RX ORDER — TOPIRAMATE 25 MG/1
TABLET ORAL
Qty: 30 TABLET | Refills: 0 | Status: SHIPPED
Start: 2022-07-26 | End: 2022-08-10

## 2022-07-27 RX ORDER — PANTOPRAZOLE SODIUM 40 MG/1
TABLET, DELAYED RELEASE ORAL
Qty: 90 TABLET | Refills: 1 | Status: SHIPPED | OUTPATIENT
Start: 2022-07-27

## 2022-07-31 DIAGNOSIS — K59.00 CONSTIPATION, UNSPECIFIED CONSTIPATION TYPE: ICD-10-CM

## 2022-08-01 RX ORDER — NALOXEGOL OXALATE 25 MG/1
TABLET, FILM COATED ORAL
Qty: 90 TABLET | Refills: 1 | Status: SHIPPED | OUTPATIENT
Start: 2022-08-01

## 2022-08-09 DIAGNOSIS — J44.9 CHRONIC OBSTRUCTIVE PULMONARY DISEASE, UNSPECIFIED COPD TYPE (HCC): ICD-10-CM

## 2022-08-09 RX ORDER — LEVALBUTEROL TARTRATE 45 UG/1
AEROSOL, METERED ORAL
Qty: 15 G | Refills: 5 | Status: SHIPPED | OUTPATIENT
Start: 2022-08-09

## 2022-08-10 RX ORDER — TOPIRAMATE 25 MG/1
TABLET ORAL
Qty: 30 TABLET | Refills: 0 | Status: SHIPPED | OUTPATIENT
Start: 2022-08-10

## 2022-11-21 ENCOUNTER — OFFICE VISIT (OUTPATIENT)
Dept: ENT CLINIC | Age: 62
End: 2022-11-21
Payer: COMMERCIAL

## 2022-11-21 VITALS
HEART RATE: 111 BPM | SYSTOLIC BLOOD PRESSURE: 147 MMHG | BODY MASS INDEX: 21.34 KG/M2 | WEIGHT: 125 LBS | DIASTOLIC BLOOD PRESSURE: 85 MMHG | HEIGHT: 64 IN

## 2022-11-21 DIAGNOSIS — K21.9 LARYNGOPHARYNGEAL REFLUX (LPR): ICD-10-CM

## 2022-11-21 DIAGNOSIS — R49.0 HOARSENESS OF VOICE: ICD-10-CM

## 2022-11-21 DIAGNOSIS — R09.81 NASAL CONGESTION: ICD-10-CM

## 2022-11-21 DIAGNOSIS — R09.82 POST-NASAL DRAINAGE: Primary | ICD-10-CM

## 2022-11-21 PROCEDURE — 99214 OFFICE O/P EST MOD 30 MIN: CPT | Performed by: NURSE PRACTITIONER

## 2022-11-21 PROCEDURE — 31575 DIAGNOSTIC LARYNGOSCOPY: CPT | Performed by: NURSE PRACTITIONER

## 2022-11-21 RX ORDER — AZELASTINE 1 MG/ML
1-2 SPRAY, METERED NASAL 2 TIMES DAILY PRN
Qty: 30 ML | Refills: 1 | Status: SHIPPED | OUTPATIENT
Start: 2022-11-21

## 2022-11-21 ASSESSMENT — ENCOUNTER SYMPTOMS
RHINORRHEA: 0
STRIDOR: 0
SINUS PRESSURE: 1
VOICE CHANGE: 1
SHORTNESS OF BREATH: 0
EYES NEGATIVE: 1
SINUS PAIN: 0
RESPIRATORY NEGATIVE: 1
SORE THROAT: 0

## 2022-11-21 NOTE — PROGRESS NOTES
[facility]  Dr. Dmitry Rogers. MATT French Ms.Ed. Patient Name:  Lawrence Garza  :  1960     CHIEF C/O:    Chief Complaint   Patient presents with    Other     NP sinusitis and hoarseness, \"I'm always having sinus infection, mucus in the back of my throat and I cannot breathe out of my nose, per pt. \"       HISTORY OBTAINED FROM:  patient    HISTORY OF PRESENT ILLNESS:       Elina Figueroa is a 63y.o. year old female, here today for sinus congestion, PND and hoarseness.     Symptoms for 1 year  Persistent congestion with PND and sinus pressure  Throat feels thick, cant clear secretions  Denies symptoms of rhinorrhea  Frequently treated by PCP for sinus infections with Z-emmett and prednisone  States she has tried nasal sprays in the past but feels like they make the right side of her face swell  Has tried otc allergy medication, cause fatigue  Some relief sudafed  Patient sees pulmonology for COPD  Former smoker, quit 3 years ago  States hoarseness was present intermittently prior to PND  Denies difficulty swallowing issues  CT of the neck showed osteophytosis of the cervical spine at the level of the esophagus        Past Medical History:   Diagnosis Date    Asthma     controlled with inhalers     BRCA1 negative     Patient thinks she had the genetic testing but don't know the results     Chronic pain     Chronic rhinitis     CRPS (complex regional pain syndrome), lower limb     left foot    Hyperlipidemia     Raynauds syndrome 2019    Right foot pain     for OR 8-26-20     RSD lower limb     left foot    Thyroid disease     Tinnitus      Past Surgical History:   Procedure Laterality Date    BREAST SURGERY  2009    lump left breast    BUNIONECTOMY  2011    left    [facility] N/A 6/15/2022    LAPAROSCOPIC CHOLECYSTECTOMY performed by Freedom Fitzgerald MD at [place] [address]      ESOPHAGUS SURGERY  's    due to gerd    FOOT SURGERY Right 8/26/2020    TARSAL TUNNEL RELEASE RIGHT FOOT performed by Dino Heimlich, DPM at [address]  09-17-12    paravertebral sympathetic left side #1    NERVE BLOCK  9 19 12    paravert sympathetic left    NERVE BLOCK  09 26 2012    left paravertebral sympathetic #3    NERVE BLOCK  10-01-12    left paravertebral sympathetic  #4    NERVE BLOCK  10-10-12    paravertebral sympathetic left #5    NERVE BLOCK  10/24/12    left sympathetic    NERVE BLOCK  11/12/12    paravert sympathetic block left #7    NERVE BLOCK  11-14-12    left paravertebral sympathetic left #8    NERVE BLOCK Left 7/23/14    lumbar sympathetic #1    NERVE BLOCK  07/30/14    paravertebral sympathetic left #2    NERVE BLOCK Left 8/6/14    PARAVERTEBRAL SUMPATHETIV BLOCK #3    NERVE BLOCK Left 9/5/2014    left paravertebral sympathetic #5    NERVE BLOCK Left 09/17/14    left paravertebral sympathetic nerve block #6 9/17/14    NERVE BLOCK Left 10 8 14    paravert sympathetic #7    NERVE BLOCK N/A 07/06/2016    sympathetic #1    NERVE BLOCK  07/13/2016    right parasympathic nerve block lumbar #2    NERVE BLOCK Right 07/20/2016    paravertebral sympathetic right #3    NERVE BLOCK Right 08/10/2016    lumbar parasympathetic block #4    NERVE BLOCK Right 08/17/2016    paravertebral sympathetic right #5    NERVE BLOCK Right 08/24/2016    paravertebral sympathetic right #6    OTHER SURGICAL HISTORY  03/27/2017    laparscopic incisional hernia repair with mesh    TUBAL LIGATION  1982    UPPER GASTROINTESTINAL ENDOSCOPY N/A 5/3/2022    EGD BIOPSY performed by Jadyn Dixon MD at [place] [address]       Current Outpatient Medications:     topiramate (TOPAMAX) 25 MG tablet, TAKE ONE TABLET BY MOUTH EVERY NIGHT, Disp: 30 tablet, Rfl: 0    levalbuterol (XOPENEX HFA) 45 MCG/ACT inhaler, INHALE 1 PUFF BY MOUTH EVERY 4 HOURS AS NEEDED FOR WHEEZING, Disp: 15 g, Rfl: 5    MOVANTIK 25 MG TABS tablet, TAKE ONE TABLET BY MOUTH EVERY MORNING, Disp: 90 tablet, Rfl: 1    pantoprazole (PROTONIX) 40 MG tablet, TAKE ONE TABLET BY MOUTH EVERY DAY, Disp: 90 tablet, Rfl: 1    atorvastatin (LIPITOR) 40 MG tablet, TAKE ONE TABLET BY MOUTH EVERY DAY, Disp: 90 tablet, Rfl: 0    ibuprofen (ADVIL;MOTRIN) 800 MG tablet, Take 1 tablet by mouth every 6 hours as needed for Pain, Disp: 20 tablet, Rfl: 0    FOLBEE 2.5-25-1 MG TABS tablet, TAKE ONE TABLET BY MOUTH EVERY DAY, Disp: , Rfl:     DULoxetine (CYMBALTA) 20 MG extended release capsule, TAKE ONE CAPSULE BY MOUTH DAILY, Disp: 90 capsule, Rfl: 0    Folinic Acid-Vit B6-Vit B12 (FOLINIC-PLUS) 4-50-2 MG TABS, TAKE ONE TABLET BY MOUTH EVERY DAY, Disp: 90 tablet, Rfl: 0    budesonide-formoterol (SYMBICORT) 160-4.5 MCG/ACT AERO, INHALE TWO PUFFS BY MOUTH TWO TIMES A DAY  RINSE MOUTH AFTER USE, Disp: , Rfl:     levothyroxine (SYNTHROID) 50 MCG tablet, TAKE ONE TABLET BY MOUTH EVERY DAY, Disp: 90 tablet, Rfl: 1    Calcium Carb-Cholecalciferol (CALCIUM 1000 + D) 1000-800 MG-UNIT TABS, Take 1 tablet by mouth daily, Disp: 90 tablet, Rfl: 1    albuterol sulfate HFA (VENTOLIN HFA) 108 (90 Base) MCG/ACT inhaler, Inhale 2 puffs into the lungs every 6 hours as needed for Wheezing, Disp: 18 g, Rfl: 2    albuterol (PROVENTIL) (2.5 MG/3ML) 0.083% nebulizer solution, INHALE 1/2 VIAL VIA NEBULIZER FOUR TIMES A DAY AS NEEDED FOR COUGH, Disp: 120 each, Rfl: 0    NIFEdipine (PROCARDIA) 10 MG capsule, TAKE ONE CAPSULE BY MOUTH TWO TIMES A DAY, Disp: , Rfl: 5    SPIRIVA RESPIMAT 2.5 MCG/ACT AERS inhaler, INHALE TWO PUFFS BY MOUTH once EVERY DAY, Disp: , Rfl: 5    vitamin B-12 (CYANOCOBALAMIN) 100 MCG tablet, Take 50 mcg by mouth daily. , Disp: , Rfl:     vitamin E 400 UNIT capsule, Take 400 Units by mouth daily.   , Disp: , Rfl:     celecoxib (CELEBREX) 50 MG capsule, Take 1 capsule by mouth 2 times daily (Patient taking differently: Take 50 mg by mouth 2 times daily Not taking), Disp: 60 capsule, Rfl: 3 azelastine (ASTELIN) 0.1 % nasal spray, USE 2 SPRAYS IN EACH NOSTRIL TWICE DAILY AS DIRECTED (Patient not taking: Reported on 2022), Disp: 30 mL, Rfl: 2  Patient has no known allergies. Social History     Tobacco Use    Smoking status: Former     Packs/day: 0.50     Years: 30.00     Pack years: 15.00     Types: Cigarettes     Quit date: 2017     Years since quittin.8    Smokeless tobacco: Never   Vaping Use    Vaping Use: Never used   Substance Use Topics    Alcohol use: Yes     Alcohol/week: 2.0 standard drinks     Types: 2 Cans of beer per week     Comment: occasional    Drug use: No     Family History   Problem Relation Age of Onset    Cancer Father         colon    Hypertension Mother     Kidney Disease Mother     Breast Cancer Paternal Aunt     Ovarian Cancer Paternal Aunt        Review of Systems   Constitutional: Negative. Negative for activity change and appetite change. HENT:  Positive for congestion, postnasal drip, sinus pressure and voice change (hoarseness). Negative for rhinorrhea, sinus pain and sore throat. Eyes: Negative. Respiratory: Negative. Negative for shortness of breath and stridor. Cardiovascular: Negative. Negative for chest pain and palpitations. Endocrine: Negative. Musculoskeletal: Negative. Skin: Negative. Neurological: Negative. Negative for dizziness. Hematological: Negative. Psychiatric/Behavioral: Negative. BP (!) 147/85 (Site: Left Upper Arm, Position: Sitting, Cuff Size: Medium Adult)   Pulse (!) 111   Ht 5' 4\" (1.626 m)   Wt 125 lb (56.7 kg)   BMI 21.46 kg/m²   Physical Exam  Constitutional:       Appearance: Normal appearance. HENT:      Head: Normocephalic. Right Ear: Tympanic membrane, ear canal and external ear normal.      Left Ear: Tympanic membrane, ear canal and external ear normal.      Nose: Nose normal. No rhinorrhea. Right Turbinates: Pale. Left Turbinates: Pale.       Mouth/Throat:     Eyes: Conjunctiva/sclera: Conjunctivae normal.      Pupils: Pupils are equal, round, and reactive to light. Cardiovascular:      Rate and Rhythm: Normal rate and regular rhythm. Pulses: Normal pulses. Pulmonary:      Effort: Pulmonary effort is normal. No respiratory distress. Breath sounds: No stridor. Musculoskeletal:         General: Normal range of motion. Cervical back: Normal range of motion. No rigidity. No muscular tenderness. Skin:     General: Skin is warm and dry. Neurological:      General: No focal deficit present. Mental Status: She is alert and oriented to person, place, and time. Psychiatric:         Mood and Affect: Mood normal.         Behavior: Behavior normal.         Thought Content: Thought content normal.         Judgment: Judgment normal.       IMPRESSION/PLAN:  Endoscopy Procedure Note    Pre-operative Diagnosis: hoarseness    Post-operative Diagnosis: normal, LPR    Indications: Hoarseness, dysphagia or aspiration - not able to be clearly evaluated by indirect laryngoscopy  Evaluation of the larynx and immediate subglottis - unable to be visualized by mirror examination    Anesthesia: Lidocaine 4% and Milad-Synephrine 1/2%    Endoscopy Type:  laryngoscopy    Procedure Details   With the patient sitting upright in the examining chair informed consent was obtained. The right side(s) of the nose was topically anesthetized with spray. After waiting an appropriate period of time for anesthesia/ vasoconstriction to become effective, the flexible fiberoptic  flexible laryngoscope was passed through the right side(s) of the nose, and the nose, nasopharynx, oropharynx, hypopharynx and larynx were examined. An identical procedure was performed on the contralateral side. Examination was performed during quiet respiration and with phonation. I was present for the entire procedure. The following findings were noted.     Findings:  Mucosa:  without erythema or discharge Nasal septum:  normal   Turbinates:  pale   Adenoid:  boggy   Eustachian tubes:  normal   Mucous stranding:  present   Lesions:  absent   Modified Ram's Maneuver not indicated   Larynx Supraglottis, false and true vocal cord were normal.  Vocal cord mobility was normal.  Subglottis is patent. Acid reflux changes/irritation       Condition:  Stable    Complications:  None    Pictures:              Gaviota Jiang was seen today for other. Diagnoses and all orders for this visit:    Post-nasal drainage    Nasal congestion    Laryngopharyngeal reflux (LPR)  -     [facility] LARYNGOSCOPY FLEXIBLE DIAGNOSTIC    Hoarseness of voice  -     [facility] LARYNGOSCOPY FLEXIBLE DIAGNOSTIC    Other orders  -     aluminum hydroxide-magnesium carbonate (GAVISCON)  MG/15ML suspension; Take 15 mLs by mouth nightly  -     azelastine (ASTELIN) 0.1 % nasal spray; 1-2 sprays by Nasal route 2 times daily as needed for Rhinitis Use in each nostril as directed    Flexible laryngoscope was performed on the patient with images and results reviewed by and discussed with Dr. Alis Abdul with pattern being consistent with persistent acid reflux symptoms. At this time patient will be placed on Astelin nasal spray, 1 to 2 sprays each nostril twice daily as needed for postnasal drainage with nasal saline spray, 2 sprays each nostril 3-4 times daily. She will also be placed on Gaviscon, 15 mL once daily at bedtime for her acid reflux symptoms. She is instructed to elevate the head of her bed, decrease spicy and greasy foods, decrease caffeine intake, and avoid eating 3 to 4 hours prior to bedtime. She will follow-up in 1 month for reevaluation. She is instructed to call with any new or worsening symptoms prior to her next appointment.       Sai Hart, MSN, FNP-C  [facility]    The information contained in this note has been dictated using drug and medical speech recognition software and may contain errors

## 2022-12-16 ENCOUNTER — HOSPITAL ENCOUNTER (OUTPATIENT)
Dept: GENERAL RADIOLOGY | Age: 62
End: 2022-12-16
Payer: COMMERCIAL

## 2022-12-16 ENCOUNTER — HOSPITAL ENCOUNTER (OUTPATIENT)
Age: 62
Discharge: HOME | End: 2022-12-16
Payer: COMMERCIAL

## 2022-12-16 DIAGNOSIS — M54.17 LUMBOSACRAL RADICULOPATHY: ICD-10-CM

## 2022-12-16 DIAGNOSIS — R10.32 LLQ ABDOMINAL PAIN: ICD-10-CM

## 2022-12-16 PROCEDURE — 74018 RADEX ABDOMEN 1 VIEW: CPT

## 2022-12-16 PROCEDURE — 72110 X-RAY EXAM L-2 SPINE 4/>VWS: CPT

## 2023-01-05 ENCOUNTER — OFFICE VISIT (OUTPATIENT)
Dept: ENT CLINIC | Age: 63
End: 2023-01-05
Payer: COMMERCIAL

## 2023-01-05 VITALS
DIASTOLIC BLOOD PRESSURE: 93 MMHG | SYSTOLIC BLOOD PRESSURE: 148 MMHG | HEART RATE: 94 BPM | WEIGHT: 125 LBS | HEIGHT: 64 IN | BODY MASS INDEX: 21.34 KG/M2

## 2023-01-05 DIAGNOSIS — R49.0 HOARSENESS OF VOICE: ICD-10-CM

## 2023-01-05 DIAGNOSIS — R09.89 GLOBUS SENSATION: ICD-10-CM

## 2023-01-05 DIAGNOSIS — R09.81 NASAL CONGESTION: Primary | ICD-10-CM

## 2023-01-05 DIAGNOSIS — J34.2 DNS (DEVIATED NASAL SEPTUM): ICD-10-CM

## 2023-01-05 DIAGNOSIS — K21.9 LARYNGOPHARYNGEAL REFLUX (LPR): ICD-10-CM

## 2023-01-05 DIAGNOSIS — J34.89 NASAL VALVE COLLAPSE: ICD-10-CM

## 2023-01-05 PROCEDURE — 99213 OFFICE O/P EST LOW 20 MIN: CPT | Performed by: NURSE PRACTITIONER

## 2023-01-05 ASSESSMENT — ENCOUNTER SYMPTOMS
SINUS PAIN: 0
VOICE CHANGE: 1
SHORTNESS OF BREATH: 0
SINUS PRESSURE: 1
STRIDOR: 0
RHINORRHEA: 0
TROUBLE SWALLOWING: 1
SORE THROAT: 0
RESPIRATORY NEGATIVE: 1
EYES NEGATIVE: 1

## 2023-01-05 NOTE — PROGRESS NOTES
[facility] Otolaryngology  Dr. Rakesh Mcguire. Fiordaliza Jimenez. Ms.Ed        Patient Name:  Dandre Ocasio  :  1960     CHIEF C/O:    Chief Complaint   Patient presents with    Follow-up     1mo f/u sinusitis \"sinus issues are the same, no changes. The nasal spray caused extreme dryness, but the nasal saline helped. I still always feel like I'm always trying to get something out of my throat all the time. \"       HISTORY OBTAINED FROM:  patient    HISTORY OF PRESENT ILLNESS:       Ravi Santana is a 66y.o. year old female, here today for follow up of nasal congestion and globus sensation. Patient was last seen 6 weeks ago and was treated with Gaviscon and GERD diet for her acid reflux symptoms and globus sensation. She states that she has not noticed significant improvement of her symptoms. She is a patient of Dr. Mary Montelongo but has not been seen in approximately 1 to 2 years. She does have history of esophageal strictures with previous dilation. Patient was also placed on Astelin spray for congestion which she states dried her sinuses out and did not tolerate. She continues using nasal saline spray, 2 sprays each nostril once daily. She continues to have persistent congestion without significant rhinorrhea or postnasal drainage at this time.       Past Medical History:   Diagnosis Date    Asthma     controlled with inhalers     BRCA1 negative     Patient thinks she had the genetic testing but don't know the results     Chronic pain     Chronic rhinitis     CRPS (complex regional pain syndrome), lower limb     left foot    Hyperlipidemia     Raynauds syndrome 2019    Right foot pain     for OR 8-26-20     RSD lower limb     left foot    Thyroid disease     Tinnitus      Past Surgical History:   Procedure Laterality Date    BREAST SURGERY  2009    lump left breast    BUNIONECTOMY  2011    left    [facility] N/A 6/15/2022    LAPAROSCOPIC CHOLECYSTECTOMY performed by Megan Agudelo Manolo Orantes MD at [facility]  2009    ESOPHAGUS SURGERY  1990's    due to gerd    FOOT SURGERY Right 8/26/2020    TARSAL TUNNEL RELEASE RIGHT FOOT performed by Melissa Kaur DPM at [address] [facility]  09-17-12    paravertebral sympathetic left side #1    NERVE BLOCK  9 19 12    paravert sympathetic left    NERVE BLOCK  09 26 2012    left paravertebral sympathetic #3    NERVE BLOCK  10-01-12    left paravertebral sympathetic  #4    NERVE BLOCK  10-10-12    paravertebral sympathetic left #5    NERVE BLOCK  10/24/12    left sympathetic    NERVE BLOCK  11/12/12    paravert sympathetic block left #7    NERVE BLOCK  11-14-12    left paravertebral sympathetic left #8    NERVE BLOCK Left 7/23/14    lumbar sympathetic #1    NERVE BLOCK  07/30/14    paravertebral sympathetic left #2    NERVE BLOCK Left 8/6/14    PARAVERTEBRAL SUMPATHETIV BLOCK #3    NERVE BLOCK Left 9/5/2014    left paravertebral sympathetic #5    NERVE BLOCK Left 09/17/14    left paravertebral sympathetic nerve block #6 9/17/14    NERVE BLOCK Left 10 8 14    paravert sympathetic #7    NERVE BLOCK N/A 07/06/2016    sympathetic #1    NERVE BLOCK  07/13/2016    right parasympathic nerve block lumbar #2    NERVE BLOCK Right 07/20/2016    paravertebral sympathetic right #3    NERVE BLOCK Right 08/10/2016    lumbar parasympathetic block #4    NERVE BLOCK Right 08/17/2016    paravertebral sympathetic right #5    NERVE BLOCK Right 08/24/2016    paravertebral sympathetic right #6    OTHER SURGICAL HISTORY  03/27/2017    laparscopic incisional hernia repair with mesh    TUBAL LIGATION  1982    UPPER GASTROINTESTINAL ENDOSCOPY N/A 5/3/2022    EGD BIOPSY performed by Lucy Coon MD at [facility]       Current Outpatient Medications:     topiramate (TOPAMAX) 25 MG tablet, TAKE ONE TABLET BY MOUTH EVERY NIGHT, Disp: 30 tablet, Rfl: 0    levalbuterol (XOPENEX HFA) 45 MCG/ACT inhaler, INHALE 1 PUFF BY MOUTH EVERY 4 HOURS AS NEEDED FOR WHEEZING, Disp: 15 g, Rfl: 5    MOVANTIK 25 MG TABS tablet, TAKE ONE TABLET BY MOUTH EVERY MORNING, Disp: 90 tablet, Rfl: 1    pantoprazole (PROTONIX) 40 MG tablet, TAKE ONE TABLET BY MOUTH EVERY DAY, Disp: 90 tablet, Rfl: 1    atorvastatin (LIPITOR) 40 MG tablet, TAKE ONE TABLET BY MOUTH EVERY DAY, Disp: 90 tablet, Rfl: 0    ibuprofen (ADVIL;MOTRIN) 800 MG tablet, Take 1 tablet by mouth every 6 hours as needed for Pain, Disp: 20 tablet, Rfl: 0    FOLBEE 2.5-25-1 MG TABS tablet, TAKE ONE TABLET BY MOUTH EVERY DAY, Disp: , Rfl:     DULoxetine (CYMBALTA) 20 MG extended release capsule, TAKE ONE CAPSULE BY MOUTH DAILY, Disp: 90 capsule, Rfl: 0    Folinic Acid-Vit B6-Vit B12 (FOLINIC-PLUS) 4-50-2 MG TABS, TAKE ONE TABLET BY MOUTH EVERY DAY, Disp: 90 tablet, Rfl: 0    celecoxib (CELEBREX) 50 MG capsule, Take 1 capsule by mouth 2 times daily (Patient taking differently: Take 50 mg by mouth 2 times daily Not taking), Disp: 60 capsule, Rfl: 3    budesonide-formoterol (SYMBICORT) 160-4.5 MCG/ACT AERO, INHALE TWO PUFFS BY MOUTH TWO TIMES A DAY  RINSE MOUTH AFTER USE, Disp: , Rfl:     levothyroxine (SYNTHROID) 50 MCG tablet, TAKE ONE TABLET BY MOUTH EVERY DAY, Disp: 90 tablet, Rfl: 1    Calcium Carb-Cholecalciferol (CALCIUM 1000 + D) 1000-800 MG-UNIT TABS, Take 1 tablet by mouth daily, Disp: 90 tablet, Rfl: 1    albuterol sulfate HFA (VENTOLIN HFA) 108 (90 Base) MCG/ACT inhaler, Inhale 2 puffs into the lungs every 6 hours as needed for Wheezing, Disp: 18 g, Rfl: 2    albuterol (PROVENTIL) (2.5 MG/3ML) 0.083% nebulizer solution, INHALE 1/2 VIAL VIA NEBULIZER FOUR TIMES A DAY AS NEEDED FOR COUGH, Disp: 120 each, Rfl: 0    NIFEdipine (PROCARDIA) 10 MG capsule, TAKE ONE CAPSULE BY MOUTH TWO TIMES A DAY, Disp: , Rfl: 5    SPIRIVA RESPIMAT 2.5 MCG/ACT AERS inhaler, INHALE TWO PUFFS BY MOUTH once EVERY DAY, Disp: , Rfl: 5    vitamin B-12 (CYANOCOBALAMIN) 100 MCG tablet, Take 50 mcg by mouth daily.  , Disp: , Rfl:     vitamin E 400 UNIT capsule, Take 400 Units by mouth daily. , Disp: , Rfl:     azelastine (ASTELIN) 0.1 % nasal spray, 1-2 sprays by Nasal route 2 times daily as needed for Rhinitis Use in each nostril as directed (Patient not taking: Reported on 2023), Disp: 30 mL, Rfl: 1  Patient has no known allergies. Social History     Tobacco Use    Smoking status: Former     Packs/day: 0.50     Years: 30.00     Pack years: 15.00     Types: Cigarettes     Quit date: 2017     Years since quittin.9    Smokeless tobacco: Never   Vaping Use    Vaping Use: Never used   Substance Use Topics    Alcohol use: Yes     Alcohol/week: 2.0 standard drinks     Types: 2 Cans of beer per week     Comment: occasional    Drug use: No     Family History   Problem Relation Age of Onset    Cancer Father         colon    Hypertension Mother     Kidney Disease Mother     Breast Cancer Paternal Aunt     Ovarian Cancer Paternal Aunt        Review of Systems   Constitutional: Negative. Negative for activity change and appetite change. HENT:  Positive for congestion, sinus pressure, trouble swallowing (Globus sensation) and voice change (hoarseness). Negative for postnasal drip, rhinorrhea, sinus pain and sore throat. Eyes: Negative. Respiratory: Negative. Negative for shortness of breath and stridor. Cardiovascular: Negative. Negative for chest pain and palpitations. Endocrine: Negative. Musculoskeletal: Negative. Skin: Negative. Neurological: Negative. Negative for dizziness. Hematological: Negative. Psychiatric/Behavioral: Negative. BP (!) 148/93 (Site: Left Upper Arm, Position: Sitting, Cuff Size: Medium Adult)   Pulse 94   Ht 5' 4\" (1.626 m)   Wt 125 lb (56.7 kg)   BMI 21.46 kg/m²   Physical Exam  Constitutional:       Appearance: Normal appearance. HENT:      Head: Normocephalic.       Right Ear: Tympanic membrane, ear canal and external ear normal.      Left Ear: Tympanic membrane, ear canal and external ear normal.      Nose: Nose normal. No rhinorrhea. Right Turbinates: Pale. Left Turbinates: Pale. Mouth/Throat:      Lips: Pink. Mouth: Mucous membranes are moist.      Pharynx: Oropharynx is clear. Eyes:      Conjunctiva/sclera: Conjunctivae normal.      Pupils: Pupils are equal, round, and reactive to light. Cardiovascular:      Rate and Rhythm: Normal rate and regular rhythm. Pulses: Normal pulses. Pulmonary:      Effort: Pulmonary effort is normal. No respiratory distress. Breath sounds: No stridor. Musculoskeletal:         General: Normal range of motion. Cervical back: Normal range of motion. No rigidity. No muscular tenderness. Skin:     General: Skin is warm and dry. Neurological:      General: No focal deficit present. Mental Status: She is alert and oriented to person, place, and time. Psychiatric:         Mood and Affect: Mood normal.         Behavior: Behavior normal.         Thought Content: Thought content normal.         Judgment: Judgment normal.       IMPRESSION/PLAN:    Tim Botello was seen today for follow-up. Diagnoses and all orders for this visit:    Nasal congestion    Nasal valve collapse    DNS (deviated nasal septum)    Laryngopharyngeal reflux (LPR)    Hoarseness of voice    Globus sensation      At this time patient will hold Astelin and Flonase nasal sprays as she does not tolerate them due to significant dryness. She will increase her nasal saline spray to 2 sprays each nostril 4 times daily. Also recommend for patient to use nasal strips for her nasal valve collapse as patient did notice significant improvement with Kevin maneuver. Patient also has a leftward nasal septal deviation and previous CT scan at [address] [place] [address]. It is recommended that she obtain the disc to bring to her next appointment.   She is also instructed to contact her GI doctor for an appointment for further evaluation of her GI symptoms. She will follow-up in 1 month with Dr. Rubi Arteaga for further evaluation of her sinuses and nasal valve collapse for possible surgical intervention.   She will call for any new or worsening symptoms prior to her next appointment    GUY Cunningham, FNP-C  [address] [place] [address], Nose and Throat    The information contained in this note has been dictated using drug and medical speech recognition software and may contain errors

## 2023-01-16 RX ORDER — PANTOPRAZOLE SODIUM 40 MG/1
TABLET, DELAYED RELEASE ORAL
Qty: 90 TABLET | Refills: 0 | OUTPATIENT
Start: 2023-01-16

## 2023-01-27 ENCOUNTER — TELEPHONE (OUTPATIENT)
Dept: ENT CLINIC | Age: 63
End: 2023-01-27

## 2023-01-27 NOTE — TELEPHONE ENCOUNTER
Pt called in to reschedule f/u appt d/t conflicting appt, pt wanted to rescheduled to after 3/8. T rescheduled 3/17/23 a 8:15 am, as pt prefers fridays.

## 2023-01-31 DIAGNOSIS — K59.00 CONSTIPATION, UNSPECIFIED CONSTIPATION TYPE: ICD-10-CM

## 2023-02-01 RX ORDER — NALOXEGOL OXALATE 25 MG/1
TABLET, FILM COATED ORAL
Qty: 90 TABLET | Refills: 0 | OUTPATIENT
Start: 2023-02-01

## 2023-02-10 ENCOUNTER — OFFICE VISIT (OUTPATIENT)
Dept: NEUROLOGY | Age: 63
End: 2023-02-10
Payer: COMMERCIAL

## 2023-02-10 VITALS
WEIGHT: 121 LBS | RESPIRATION RATE: 16 BRPM | OXYGEN SATURATION: 93 % | DIASTOLIC BLOOD PRESSURE: 85 MMHG | SYSTOLIC BLOOD PRESSURE: 133 MMHG | HEIGHT: 64 IN | TEMPERATURE: 97.5 F | HEART RATE: 97 BPM | BODY MASS INDEX: 20.66 KG/M2

## 2023-02-10 DIAGNOSIS — R42 VERTIGO: Primary | ICD-10-CM

## 2023-02-10 DIAGNOSIS — R42 PERSISTENT POSTURAL-PERCEPTUAL DIZZINESS: ICD-10-CM

## 2023-02-10 PROBLEM — G89.4 CHRONIC PAIN SYNDROME: Chronic | Status: RESOLVED | Noted: 2017-09-20 | Resolved: 2023-02-10

## 2023-02-10 PROBLEM — R89.9 ABNORMAL LABORATORY TEST RESULT: Status: RESOLVED | Noted: 2019-02-04 | Resolved: 2023-02-10

## 2023-02-10 PROBLEM — R79.89 ELEVATED LFTS: Status: RESOLVED | Noted: 2019-01-21 | Resolved: 2023-02-10

## 2023-02-10 PROCEDURE — 99214 OFFICE O/P EST MOD 30 MIN: CPT | Performed by: PHYSICIAN ASSISTANT

## 2023-02-10 RX ORDER — DIAZEPAM 2 MG/1
2 TABLET ORAL 2 TIMES DAILY PRN
Qty: 30 TABLET | Refills: 0 | Status: SHIPPED | OUTPATIENT
Start: 2023-02-10 | End: 2023-03-12
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
## 2023-03-17 ENCOUNTER — OFFICE VISIT (OUTPATIENT)
Dept: ENT CLINIC | Age: 63
End: 2023-03-17
Payer: COMMERCIAL

## 2023-03-17 VITALS
WEIGHT: 123 LBS | BODY MASS INDEX: 21 KG/M2 | HEIGHT: 64 IN | HEART RATE: 100 BPM | SYSTOLIC BLOOD PRESSURE: 140 MMHG | DIASTOLIC BLOOD PRESSURE: 84 MMHG

## 2023-03-17 DIAGNOSIS — R09.82 POST-NASAL DRAINAGE: ICD-10-CM

## 2023-03-17 DIAGNOSIS — J34.89 NASAL OBSTRUCTION: Primary | ICD-10-CM

## 2023-03-17 PROCEDURE — 99204 OFFICE O/P NEW MOD 45 MIN: CPT | Performed by: OTOLARYNGOLOGY

## 2023-03-17 RX ORDER — MONTELUKAST SODIUM 10 MG/1
10 TABLET ORAL DAILY
Qty: 30 TABLET | Refills: 3 | Status: SHIPPED | OUTPATIENT
Start: 2023-03-17

## 2023-03-17 ASSESSMENT — ENCOUNTER SYMPTOMS
COLOR CHANGE: 0
TROUBLE SWALLOWING: 0
VOICE CHANGE: 0
RHINORRHEA: 0
SINUS PAIN: 0
COUGH: 0
WHEEZING: 0
SINUS PRESSURE: 0
SORE THROAT: 0
GASTROINTESTINAL NEGATIVE: 1
STRIDOR: 0
CHOKING: 0

## 2023-03-17 ASSESSMENT — VISUAL ACUITY: OU: 1

## 2023-03-17 NOTE — PROGRESS NOTES
Drug use: No     Family History   Problem Relation Age of Onset    Cancer Father         colon    Hypertension Mother     Kidney Disease Mother     Breast Cancer Paternal Aunt     Ovarian Cancer Paternal Aunt        Review of Systems   Constitutional:  Negative for activity change, fatigue and fever. HENT:  Positive for congestion and postnasal drip. Negative for ear discharge, ear pain, hearing loss, nosebleeds, rhinorrhea, sinus pressure, sinus pain, sore throat, tinnitus, trouble swallowing and voice change. Respiratory:  Negative for cough, choking, wheezing and stridor. Cardiovascular:  Negative for chest pain. Gastrointestinal: Negative. Genitourinary: Negative. Skin:  Negative for color change and rash. Neurological:  Negative for speech difficulty, light-headedness, numbness and headaches. Hematological:  Negative for adenopathy. Psychiatric/Behavioral:  Negative for behavioral problems. BP (!) 140/84 (Site: Right Upper Arm, Position: Sitting, Cuff Size: Medium Adult)   Pulse 100   Ht 5' 4\" (1.626 m)   Wt 123 lb (55.8 kg)   BMI 21.11 kg/m²   Physical Exam  Constitutional:       Appearance: Normal appearance. She is normal weight. HENT:      Head: Normocephalic and atraumatic. Right Ear: Tympanic membrane, ear canal and external ear normal. No drainage. Left Ear: Tympanic membrane, ear canal and external ear normal. No drainage. Nose: Septal deviation present. No nasal deformity. Comments: No appreciable nasal valve collapse     Mouth/Throat:      Mouth: Mucous membranes are moist.   Eyes:      General: Lids are normal. Vision grossly intact. Extraocular Movements: Extraocular movements intact. Conjunctiva/sclera: Conjunctivae normal.      Pupils: Pupils are equal, round, and reactive to light. Cardiovascular:      Rate and Rhythm: Normal rate.    Pulmonary:      Effort: Pulmonary effort is normal.   Musculoskeletal:         General: Normal

## 2023-03-21 ENCOUNTER — HOSPITAL ENCOUNTER (EMERGENCY)
Age: 63
Discharge: HOME | End: 2023-03-21
Payer: COMMERCIAL

## 2023-03-21 ENCOUNTER — APPOINTMENT (OUTPATIENT)
Dept: GENERAL RADIOLOGY | Age: 63
End: 2023-03-21
Payer: COMMERCIAL

## 2023-03-21 VITALS
SYSTOLIC BLOOD PRESSURE: 140 MMHG | WEIGHT: 120 LBS | DIASTOLIC BLOOD PRESSURE: 79 MMHG | TEMPERATURE: 98.8 F | RESPIRATION RATE: 16 BRPM | OXYGEN SATURATION: 100 % | HEART RATE: 113 BPM | BODY MASS INDEX: 20.6 KG/M2

## 2023-03-21 DIAGNOSIS — S93.402A SPRAIN OF LEFT ANKLE, UNSPECIFIED LIGAMENT, INITIAL ENCOUNTER: Primary | ICD-10-CM

## 2023-03-21 PROCEDURE — 73590 X-RAY EXAM OF LOWER LEG: CPT

## 2023-03-21 PROCEDURE — 99211 OFF/OP EST MAY X REQ PHY/QHP: CPT

## 2023-03-23 ENCOUNTER — TELEPHONE (OUTPATIENT)
Dept: ENT CLINIC | Age: 63
End: 2023-03-23

## 2023-04-14 ENCOUNTER — HOSPITAL ENCOUNTER (OUTPATIENT)
Dept: MAMMOGRAPHY | Age: 63
Discharge: HOME | End: 2023-04-16
Payer: COMMERCIAL

## 2023-04-14 DIAGNOSIS — Z12.31 ENCOUNTER FOR SCREENING MAMMOGRAM FOR MALIGNANT NEOPLASM OF BREAST: ICD-10-CM

## 2023-04-14 DIAGNOSIS — M81.0 OSTEOPOROSIS, UNSPECIFIED OSTEOPOROSIS TYPE, UNSPECIFIED PATHOLOGICAL FRACTURE PRESENCE: ICD-10-CM

## 2023-04-14 PROCEDURE — 77080 DXA BONE DENSITY AXIAL: CPT

## 2023-04-14 PROCEDURE — 77063 BREAST TOMOSYNTHESIS BI: CPT

## 2023-04-20 ENCOUNTER — TELEPHONE (OUTPATIENT)
Dept: CASE MANAGEMENT | Age: 63
End: 2023-04-20

## 2023-04-20 NOTE — TELEPHONE ENCOUNTER
No call, encounter opened to process CT Lung Screening. CT Lung Screen: 4/14/2023    Impression   1. Severe emphysema with bilateral upper lobe and biapical mild scarring. No pneumonia or acute chest disease identified. 2.  Left upper lobe 10 mm irregular nodule with small cavitation. Possible   malignancy and further correlation with PET-CT is recommended. 3.  Both lungs demonstrate multiple additional tiny nodules which are   noncalcified and potentially early metastatic disease. At least 4 nodules   within the right lung and at least 6 nodules within the left lung. Bilateral   lung nodules were not seen previously. LUNG RADS:   Lung-RADS 4A - Suspicious ()       Management: PET-CT recommended. RECOMMENDATIONS:   If you would like to register your patient with the [facility], please contact the Nurse Navigator at   [phone]. Pack years: 13    Social History     Tobacco Use  Smoking Status: Former Smoker    Start Date:    Quit Date: 01/31/2017   Types: Cigarettes   Packs/Day: 0.5   Years: 30   Pack Years: 15   Smokeless Tobacco: Never         Results letter sent to patient via my chart or mailed.      [facility]

## 2023-04-24 ENCOUNTER — TELEPHONE (OUTPATIENT)
Dept: ENT CLINIC | Age: 63
End: 2023-04-24

## 2023-04-24 NOTE — TELEPHONE ENCOUNTER
Spoke with patient in regards to response and patient states that she did not have any nasal packing when she was seen LOV     Please advise

## 2023-04-24 NOTE — TELEPHONE ENCOUNTER
Patient Magdalena Hinds stating that her Sinus XR was not in system to have done.   Reviewed LOV 3/17/23 and nothing stated about an XR    Please advise

## 2023-04-24 NOTE — TELEPHONE ENCOUNTER
Patient underwent nasal packing.   Negating the need for any nasal or sinus x-ray required, she has no nasal obstruction or complaints of shortness of breath

## 2023-04-24 NOTE — TELEPHONE ENCOUNTER
Pt was advised that she had a endoscopy which was a scope in the nose there fore there is no need for x-ray, no obstruction per Dr. Derrick Camacho. Pt confirmed she understood.

## 2023-05-22 ENCOUNTER — HOSPITAL ENCOUNTER (OUTPATIENT)
Dept: NUCLEAR MEDICINE | Age: 63
Discharge: HOME | End: 2023-05-22
Payer: COMMERCIAL

## 2023-05-22 DIAGNOSIS — R91.1 SOLITARY PULMONARY NODULE: ICD-10-CM

## 2023-05-22 PROCEDURE — 3430000000 HC RX DIAGNOSTIC RADIOPHARMACEUTICAL: Performed by: RADIOLOGY

## 2023-05-22 PROCEDURE — A9552 F18 FDG: HCPCS | Performed by: RADIOLOGY

## 2023-05-22 RX ORDER — FLUDEOXYGLUCOSE F 18 200 MCI/ML
10 INJECTION, SOLUTION INTRAVENOUS
Status: COMPLETED | OUTPATIENT
Start: 2023-05-22 | End: 2023-05-22

## 2023-05-22 RX ADMIN — FLUDEOXYGLUCOSE F 18 10 MILLICURIE: 200 INJECTION, SOLUTION INTRAVENOUS at 10:08

## 2023-05-27 ENCOUNTER — APPOINTMENT (OUTPATIENT)
Dept: CT IMAGING | Age: 63
End: 2023-05-27
Payer: COMMERCIAL

## 2023-05-27 ENCOUNTER — HOSPITAL ENCOUNTER (EMERGENCY)
Age: 63
Discharge: HOME | End: 2023-05-27
Attending: EMERGENCY MEDICINE
Payer: COMMERCIAL

## 2023-05-27 VITALS
TEMPERATURE: 97.8 F | OXYGEN SATURATION: 97 % | WEIGHT: 120 LBS | DIASTOLIC BLOOD PRESSURE: 82 MMHG | SYSTOLIC BLOOD PRESSURE: 137 MMHG | RESPIRATION RATE: 18 BRPM | HEART RATE: 81 BPM | HEIGHT: 64 IN | BODY MASS INDEX: 20.49 KG/M2

## 2023-05-27 DIAGNOSIS — R10.9 ABDOMINAL PAIN, UNSPECIFIED ABDOMINAL LOCATION: Primary | ICD-10-CM

## 2023-05-27 LAB
ALBUMIN SERPL-MCNC: 4.2 G/DL (ref 3.5–5.2)
ALP SERPL-CCNC: 94 U/L (ref 35–104)
ALT SERPL-CCNC: 27 U/L (ref 0–32)
ANION GAP SERPL CALCULATED.3IONS-SCNC: 10 MMOL/L (ref 7–16)
AST SERPL-CCNC: 27 U/L (ref 0–31)
BACTERIA URNS QL MICRO: NORMAL /HPF
BASOPHILS # BLD: 0.05 E9/L (ref 0–0.2)
BASOPHILS NFR BLD: 0.9 % (ref 0–2)
BILIRUB DIRECT SERPL-MCNC: <0.2 MG/DL (ref 0–0.3)
BILIRUB INDIRECT SERPL-MCNC: NORMAL MG/DL (ref 0–1)
BILIRUB SERPL-MCNC: 0.4 MG/DL (ref 0–1.2)
BILIRUB UR QL STRIP: NEGATIVE
BUN SERPL-MCNC: 9 MG/DL (ref 6–23)
CALCIUM SERPL-MCNC: 9.1 MG/DL (ref 8.6–10.2)
CHLORIDE SERPL-SCNC: 103 MMOL/L (ref 98–107)
CLARITY UR: CLEAR
CO2 SERPL-SCNC: 25 MMOL/L (ref 22–29)
COLOR UR: YELLOW
CREAT SERPL-MCNC: 0.6 MG/DL (ref 0.5–1)
EKG ATRIAL RATE: 83 BPM
EKG P AXIS: 80 DEGREES
EKG P-R INTERVAL: 116 MS
EKG Q-T INTERVAL: 378 MS
EKG QRS DURATION: 72 MS
EKG QTC CALCULATION (BAZETT): 444 MS
EKG R AXIS: 65 DEGREES
EKG T AXIS: 79 DEGREES
EKG VENTRICULAR RATE: 83 BPM
EOSINOPHIL # BLD: 0.12 E9/L (ref 0.05–0.5)
EOSINOPHIL NFR BLD: 2.1 % (ref 0–6)
ERYTHROCYTE [DISTWIDTH] IN BLOOD BY AUTOMATED COUNT: 12.6 FL (ref 11.5–15)
GLUCOSE SERPL-MCNC: 111 MG/DL (ref 74–99)
GLUCOSE UR STRIP-MCNC: NEGATIVE MG/DL
HCT VFR BLD AUTO: 40.9 % (ref 34–48)
HGB BLD-MCNC: 13.9 G/DL (ref 11.5–15.5)
HGB UR QL STRIP: NEGATIVE
IMM GRANULOCYTES # BLD: 0.03 E9/L
IMM GRANULOCYTES NFR BLD: 0.5 % (ref 0–5)
KETONES UR STRIP-MCNC: NEGATIVE MG/DL
LEUKOCYTE ESTERASE UR QL STRIP: NEGATIVE
LIPASE: 26 U/L (ref 13–60)
LYMPHOCYTES # BLD: 1.21 E9/L (ref 1.5–4)
LYMPHOCYTES NFR BLD: 20.8 % (ref 20–42)
MCH RBC QN AUTO: 32.8 PG (ref 26–35)
MCHC RBC AUTO-ENTMCNC: 34 % (ref 32–34.5)
MCV RBC AUTO: 96.5 FL (ref 80–99.9)
MONOCYTES # BLD: 0.45 E9/L (ref 0.1–0.95)
MONOCYTES NFR BLD: 7.7 % (ref 2–12)
NEUTROPHILS # BLD: 3.95 E9/L (ref 1.8–7.3)
NEUTS SEG NFR BLD: 68 % (ref 43–80)
NITRITE UR QL STRIP: NEGATIVE
PH UR STRIP: 5 [PH] (ref 5–9)
PLATELET # BLD AUTO: 240 E9/L (ref 130–450)
PMV BLD AUTO: 11.2 FL (ref 7–12)
POTASSIUM SERPL-SCNC: 4.1 MMOL/L (ref 3.5–5)
PROT SERPL-MCNC: 6.4 G/DL (ref 6.4–8.3)
PROT UR STRIP-MCNC: NEGATIVE MG/DL
RBC # BLD AUTO: 4.24 E12/L (ref 3.5–5.5)
RBC #/AREA URNS HPF: NORMAL /HPF (ref 0–2)
REASON FOR REJECTION: NORMAL
REJECTED TEST: NORMAL
SODIUM SERPL-SCNC: 138 MMOL/L (ref 132–146)
SP GR UR STRIP: 1.02 (ref 1–1.03)
UROBILINOGEN UR STRIP-ACNC: 0.2 E.U./DL
WBC # BLD: 5.8 E9/L (ref 4.5–11.5)
WBC #/AREA URNS HPF: NORMAL /HPF (ref 0–5)

## 2023-05-27 PROCEDURE — 85025 COMPLETE CBC W/AUTO DIFF WBC: CPT

## 2023-05-27 PROCEDURE — 36569 INSJ PICC 5 YR+ W/O IMAGING: CPT

## 2023-05-27 PROCEDURE — 96375 TX/PRO/DX INJ NEW DRUG ADDON: CPT

## 2023-05-27 PROCEDURE — 96376 TX/PRO/DX INJ SAME DRUG ADON: CPT

## 2023-05-27 PROCEDURE — 81001 URINALYSIS AUTO W/SCOPE: CPT

## 2023-05-27 PROCEDURE — 80076 HEPATIC FUNCTION PANEL: CPT

## 2023-05-27 PROCEDURE — 96374 THER/PROPH/DIAG INJ IV PUSH: CPT

## 2023-05-27 PROCEDURE — 80048 BASIC METABOLIC PNL TOTAL CA: CPT

## 2023-05-27 PROCEDURE — 74177 CT ABD & PELVIS W/CONTRAST: CPT

## 2023-05-27 PROCEDURE — 6360000004 HC RX CONTRAST MEDICATION: Performed by: RADIOLOGY

## 2023-05-27 PROCEDURE — 93005 ELECTROCARDIOGRAM TRACING: CPT | Performed by: STUDENT IN AN ORGANIZED HEALTH CARE EDUCATION/TRAINING PROGRAM

## 2023-05-27 PROCEDURE — 99285 EMERGENCY DEPT VISIT HI MDM: CPT

## 2023-05-27 PROCEDURE — 93010 ELECTROCARDIOGRAM REPORT: CPT | Performed by: INTERNAL MEDICINE

## 2023-05-27 PROCEDURE — 6360000002 HC RX W HCPCS: Performed by: STUDENT IN AN ORGANIZED HEALTH CARE EDUCATION/TRAINING PROGRAM

## 2023-05-27 PROCEDURE — 83690 ASSAY OF LIPASE: CPT

## 2023-05-27 RX ORDER — DICYCLOMINE HYDROCHLORIDE 10 MG/1
10 CAPSULE ORAL 4 TIMES DAILY
Qty: 120 CAPSULE | Refills: 0 | Status: SHIPPED | OUTPATIENT
Start: 2023-05-27

## 2023-05-27 RX ORDER — ONDANSETRON 2 MG/ML
4 INJECTION INTRAMUSCULAR; INTRAVENOUS ONCE
Status: COMPLETED | OUTPATIENT
Start: 2023-05-27 | End: 2023-05-27

## 2023-05-27 RX ORDER — ONDANSETRON 4 MG/1
4 TABLET, ORALLY DISINTEGRATING ORAL 3 TIMES DAILY PRN
Qty: 21 TABLET | Refills: 0 | Status: SHIPPED | OUTPATIENT
Start: 2023-05-27

## 2023-05-27 RX ORDER — FENTANYL CITRATE 0.05 MG/ML
50 INJECTION, SOLUTION INTRAMUSCULAR; INTRAVENOUS ONCE
Status: COMPLETED | OUTPATIENT
Start: 2023-05-27 | End: 2023-05-27

## 2023-05-27 RX ADMIN — IOPAMIDOL 75 ML: 755 INJECTION, SOLUTION INTRAVENOUS at 13:06

## 2023-05-27 RX ADMIN — FENTANYL CITRATE 50 MCG: 0.05 INJECTION, SOLUTION INTRAMUSCULAR; INTRAVENOUS at 14:06

## 2023-05-27 RX ADMIN — FENTANYL CITRATE 50 MCG: 0.05 INJECTION, SOLUTION INTRAMUSCULAR; INTRAVENOUS at 10:27

## 2023-05-27 RX ADMIN — ONDANSETRON 4 MG: 2 INJECTION INTRAMUSCULAR; INTRAVENOUS at 10:26

## 2023-05-27 ASSESSMENT — ENCOUNTER SYMPTOMS
VOMITING: 0
NAUSEA: 1
SORE THROAT: 0
DIARRHEA: 0
ABDOMINAL PAIN: 1
EYE DISCHARGE: 0
EYE PAIN: 0
BACK PAIN: 0
ABDOMINAL DISTENTION: 0
SINUS PRESSURE: 0
WHEEZING: 0
SHORTNESS OF BREATH: 0
COUGH: 0
EYE REDNESS: 0

## 2023-05-27 ASSESSMENT — PAIN DESCRIPTION - DESCRIPTORS
DESCRIPTORS: DULL;ACHING;TIGHTNESS
DESCRIPTORS: STABBING

## 2023-05-27 ASSESSMENT — PAIN DESCRIPTION - ORIENTATION
ORIENTATION: RIGHT;MID
ORIENTATION: RIGHT

## 2023-05-27 ASSESSMENT — PAIN DESCRIPTION - LOCATION
LOCATION: ABDOMEN
LOCATION: ABDOMEN;BACK

## 2023-05-27 ASSESSMENT — PAIN SCALES - GENERAL
PAINLEVEL_OUTOF10: 10
PAINLEVEL_OUTOF10: 10

## 2023-05-27 ASSESSMENT — PAIN - FUNCTIONAL ASSESSMENT: PAIN_FUNCTIONAL_ASSESSMENT: 0-10

## 2023-05-27 NOTE — ED PROVIDER NOTES
not ordered but considered, Shared Decision Making, Pt Expectation of Test or Tx.): Upon shared decision making patient safe to be discharged home follow with their family doctor . Did advise on return precautions to the emergency department. Did also advise follow-up with her primary care physician. Patient agreeable with the plan moving forward. The cardiac monitor revealed sinus as interpreted by me. The cardiac monitor was ordered secondary to the patients abdominal pain and to monitor the patient for dysrhythmia      Medications - No data to display      I am the primary provider of record      ED Course as of 05/27/23 1525   Sat May 27, 2023   0912   ATTENDING PROVIDER ATTESTATION:     I have personally performed and/or participated in the history, exam, medical decision making, and procedures and agree with all pertinent clinical information unless otherwise noted. I have also reviewed and agree with the past medical, family and social history unless otherwise noted. I have discussed this patient in detail with the resident and provided the instruction and education regarding the evidence-based evaluation and treatment of abd pain. Any EKG that may have been performed has been personally reviewed by me and I agree with the documentation as noted by the resident. History: Patient presents to the ED for evaluation of abdominal pain. Pain is acute in her epigastric and right upper quadrant and radiates to her back. She states she has associated nausea but no emesis. No fever or chills. No associated diarrhea. No blood in the stool or urine. She does occasionally drink alcohol. No prior history of pancreatitis or kidney stones. Denies any discomfort with urinating. Denies any vaginal bleeding or discharge. My findings: Amilcar Kelley is a 66 y.o. female whom is in no distress. Physical exam reveals patient to be uncomfortable but in no distress. Heart regular rate and rhythm.

## 2023-06-16 ENCOUNTER — OFFICE VISIT (OUTPATIENT)
Dept: ENT CLINIC | Age: 63
End: 2023-06-16
Payer: COMMERCIAL

## 2023-06-16 VITALS
WEIGHT: 118 LBS | DIASTOLIC BLOOD PRESSURE: 85 MMHG | BODY MASS INDEX: 20.14 KG/M2 | SYSTOLIC BLOOD PRESSURE: 135 MMHG | HEART RATE: 103 BPM | HEIGHT: 64 IN

## 2023-06-16 DIAGNOSIS — R06.09 CHRONIC DYSPNEA: ICD-10-CM

## 2023-06-16 DIAGNOSIS — K21.9 LARYNGOPHARYNGEAL REFLUX (LPR): Primary | ICD-10-CM

## 2023-06-16 DIAGNOSIS — R09.89 GLOBUS SENSATION: ICD-10-CM

## 2023-06-16 PROCEDURE — 99213 OFFICE O/P EST LOW 20 MIN: CPT | Performed by: OTOLARYNGOLOGY

## 2023-06-19 ASSESSMENT — ENCOUNTER SYMPTOMS
COUGH: 0
SHORTNESS OF BREATH: 0
VOMITING: 0
RHINORRHEA: 1

## 2023-07-21 ENCOUNTER — TELEPHONE (OUTPATIENT)
Dept: ENT CLINIC | Age: 63
End: 2023-07-21

## 2023-07-21 NOTE — TELEPHONE ENCOUNTER
Called patient and moved ENT appt to 8/8/23 due to CT being 7/31/23. Informed patient to have labs drawn prior to CT. Patient states that she understands. BUN Creatinine ordered and informed patient to go to a [facility] facility

## 2023-07-24 DIAGNOSIS — Z01.812 PRE-PROCEDURE LAB EXAM: ICD-10-CM

## 2023-07-24 LAB
BUN SERPL-MCNC: 17 MG/DL (ref 6–23)
CREAT SERPL-MCNC: 0.8 MG/DL (ref 0.5–1)
GFR SERPL CREATININE-BSD FRML MDRD: >60 ML/MIN/1.73M2

## 2023-08-08 ENCOUNTER — OFFICE VISIT (OUTPATIENT)
Dept: ENT CLINIC | Age: 63
End: 2023-08-08
Payer: COMMERCIAL

## 2023-08-08 VITALS
SYSTOLIC BLOOD PRESSURE: 114 MMHG | HEART RATE: 105 BPM | WEIGHT: 114.4 LBS | HEIGHT: 64 IN | DIASTOLIC BLOOD PRESSURE: 81 MMHG | BODY MASS INDEX: 19.53 KG/M2

## 2023-08-08 DIAGNOSIS — R06.09 CHRONIC DYSPNEA: ICD-10-CM

## 2023-08-08 DIAGNOSIS — K21.9 LARYNGOPHARYNGEAL REFLUX (LPR): ICD-10-CM

## 2023-08-08 DIAGNOSIS — R09.89 GLOBUS SENSATION: ICD-10-CM

## 2023-08-08 DIAGNOSIS — J34.89 NASAL OBSTRUCTION: ICD-10-CM

## 2023-08-08 DIAGNOSIS — I65.22 CAROTID STENOSIS, ASYMPTOMATIC, LEFT: Primary | ICD-10-CM

## 2023-08-08 PROCEDURE — 99214 OFFICE O/P EST MOD 30 MIN: CPT | Performed by: OTOLARYNGOLOGY

## 2023-08-08 ASSESSMENT — ENCOUNTER SYMPTOMS
SHORTNESS OF BREATH: 0
COUGH: 0
SORE THROAT: 0
TROUBLE SWALLOWING: 0
RHINORRHEA: 1
VOICE CHANGE: 0
VOMITING: 0

## 2023-08-08 NOTE — PROGRESS NOTES
[facility] Otolaryngology  Dr. Regna Obrien. Ms.Ed        Patient Name:  Mateo Parkinson  :  1960     CHIEF C/O:    Chief Complaint   Patient presents with    Follow-up     6 week follow up CT results        HISTORY OBTAINED FROM:  patient    HISTORY OF PRESENT ILLNESS:       Olayinka Gudino is a 66y.o. year old female, here today for follow up of:       Here for follow up for CT soft tissue for chronic dyspnea which was negative for any ENT concern, CT showed concern for carotid stenosis, left sided. Patient continues to have intermittent episodes of left-sided neck pain, and fullness that she denies any associated shortness of breath hoarseness fever chills nausea vomiting or chest pain. No current complaints of headaches or vision changes no complaints of room spinning vertigo or hearing loss.          Past Medical History:   Diagnosis Date    Asthma     controlled with inhalers     BRCA1 negative     Patient thinks she had the genetic testing but don't know the results     Chronic pain     Chronic rhinitis     CRPS (complex regional pain syndrome), lower limb     left foot    Hyperlipidemia     Raynauds syndrome     Right foot pain     for OR 20     RSD lower limb     left foot    Thyroid disease     Tinnitus      Past Surgical History:   Procedure Laterality Date    BREAST SURGERY  2009    lump left breast    BUNIONECTOMY  2011    left    [address] [facility] LAPAROSCOPIC N/A 6/15/2022    LAPAROSCOPIC CHOLECYSTECTOMY performed by Khoi Sun MD at [facility]      ESOPHAGUS SURGERY  's    due to gerd    FOOT SURGERY Right 2020    TARSAL TUNNEL RELEASE RIGHT FOOT performed by Gus Dietz DPM at [address] [facility] [address]  12    paravertebral sympathetic left side #1    NERVE BLOCK  12    paravert sympathetic left    NERVE BLOCK  2012    left paravertebral

## 2023-08-23 ENCOUNTER — HOSPITAL ENCOUNTER (OUTPATIENT)
Dept: CT IMAGING | Age: 63
Discharge: HOME | End: 2023-08-23
Attending: INTERNAL MEDICINE
Payer: COMMERCIAL

## 2023-08-23 ENCOUNTER — HOSPITAL ENCOUNTER (OUTPATIENT)
Dept: ULTRASOUND IMAGING | Age: 63
Discharge: HOME | End: 2023-08-23
Attending: OTOLARYNGOLOGY
Payer: COMMERCIAL

## 2023-08-23 DIAGNOSIS — R91.8 OTHER NONSPECIFIC ABNORMAL FINDING OF LUNG FIELD: ICD-10-CM

## 2023-08-23 DIAGNOSIS — I65.22 CAROTID STENOSIS, ASYMPTOMATIC, LEFT: ICD-10-CM

## 2023-08-23 PROCEDURE — 71250 CT THORAX DX C-: CPT

## 2023-08-23 PROCEDURE — 93880 EXTRACRANIAL BILAT STUDY: CPT

## 2023-08-24 ENCOUNTER — TELEPHONE (OUTPATIENT)
Dept: VASCULAR SURGERY | Age: 63
End: 2023-08-24

## 2023-08-24 NOTE — TELEPHONE ENCOUNTER
Received referral from Dr. Jonna Smith for CRISTHIAN, left message for patient to schedule appointment to see Dr. Ameya Mcbride.

## 2023-10-31 ENCOUNTER — OFFICE VISIT (OUTPATIENT)
Dept: VASCULAR SURGERY | Age: 63
End: 2023-10-31
Payer: COMMERCIAL

## 2023-10-31 VITALS — WEIGHT: 116 LBS | BODY MASS INDEX: 19.91 KG/M2

## 2023-10-31 DIAGNOSIS — I65.23 BILATERAL CAROTID ARTERY STENOSIS: Primary | ICD-10-CM

## 2023-10-31 PROCEDURE — 99203 OFFICE O/P NEW LOW 30 MIN: CPT | Performed by: SURGERY

## 2023-10-31 NOTE — PROGRESS NOTES
#5    NERVE BLOCK  10/24/12    left sympathetic    NERVE BLOCK  11/12/12    paravert sympathetic block left #7    NERVE BLOCK  11-14-12    left paravertebral sympathetic left #8    NERVE BLOCK Left 7/23/14    lumbar sympathetic #1    NERVE BLOCK  07/30/14    paravertebral sympathetic left #2    NERVE BLOCK Left 8/6/14    PARAVERTEBRAL SUMPATHETIV BLOCK #3    NERVE BLOCK Left 9/5/2014    left paravertebral sympathetic #5    NERVE BLOCK Left 09/17/14    left paravertebral sympathetic nerve block #6 9/17/14    NERVE BLOCK Left 10 8 14    paravert sympathetic #7    NERVE BLOCK N/A 07/06/2016    sympathetic #1    NERVE BLOCK  07/13/2016    right parasympathic nerve block lumbar #2    NERVE BLOCK Right 07/20/2016    paravertebral sympathetic right #3    NERVE BLOCK Right 08/10/2016    lumbar parasympathetic block #4    NERVE BLOCK Right 08/17/2016    paravertebral sympathetic right #5    NERVE BLOCK Right 08/24/2016    paravertebral sympathetic right #6    OTHER SURGICAL HISTORY  03/27/2017    laparscopic incisional hernia repair with mesh    TUBAL LIGATION  1982    UPPER GASTROINTESTINAL ENDOSCOPY N/A 5/3/2022    EGD BIOPSY performed by Nancy Wright MD at [address] [facility]     Current Medications:   Prior to Admission medications    Medication Sig Start Date End Date Taking?  Authorizing Provider   dicyclomine (BENTYL) 10 MG capsule Take 1 capsule by mouth 4 times daily 5/27/23  Yes Nikos Starks MD   ondansetron (ZOFRAN-ODT) 4 MG disintegrating tablet Take 1 tablet by mouth 3 times daily as needed for Nausea or Vomiting 5/27/23  Yes Nikos Starks MD   azelastine (ASTELIN) 0.1 % nasal spray 1-2 sprays by Nasal route 2 times daily as needed for Rhinitis Use in each nostril as directed 11/21/22  Yes Fabiola Sharpe, APRN - CNP   levalbuterol (XOPENEX HFA) 45 MCG/ACT inhaler INHALE 1 PUFF BY MOUTH EVERY 4 HOURS AS NEEDED FOR WHEEZING 8/9/22  Yes Robbie Robledo DO   MOVANTIK 25 MG TABS tablet TAKE ONE TABLET BY

## 2023-11-07 LAB
BASOPHILS # BLD: 0.05 K/UL (ref 0–0.2)
BASOPHILS NFR BLD: 1 % (ref 0–2)
EOSINOPHIL # BLD: 0.09 K/UL (ref 0.05–0.5)
EOSINOPHILS RELATIVE PERCENT: 2 % (ref 0–6)
ERYTHROCYTE [DISTWIDTH] IN BLOOD BY AUTOMATED COUNT: 13.4 % (ref 11.5–15)
HCT VFR BLD AUTO: 41 % (ref 34–48)
HGB BLD-MCNC: 13.2 G/DL (ref 11.5–15.5)
IMM GRANULOCYTES # BLD AUTO: <0.03 K/UL (ref 0–0.58)
IMM GRANULOCYTES NFR BLD: 0 % (ref 0–5)
LYMPHOCYTES NFR BLD: 1.11 K/UL (ref 1.5–4)
LYMPHOCYTES RELATIVE PERCENT: 24 % (ref 20–42)
MCH RBC QN AUTO: 32.8 PG (ref 26–35)
MCHC RBC AUTO-ENTMCNC: 32.2 G/DL (ref 32–34.5)
MCV RBC AUTO: 102 FL (ref 80–99.9)
MONOCYTES NFR BLD: 0.42 K/UL (ref 0.1–0.95)
MONOCYTES NFR BLD: 9 % (ref 2–12)
NEUTROPHILS NFR BLD: 63 % (ref 43–80)
NEUTS SEG NFR BLD: 2.92 K/UL (ref 1.8–7.3)
PLATELET # BLD AUTO: 220 K/UL (ref 130–450)
PMV BLD AUTO: 10.1 FL (ref 7–12)
RBC # BLD AUTO: 4.02 M/UL (ref 3.5–5.5)
WBC OTHER # BLD: 4.6 K/UL (ref 4.5–11.5)

## 2023-11-08 LAB
25(OH)D3 SERPL-MCNC: 49.2 NG/ML (ref 30–100)
ALBUMIN SERPL-MCNC: 4.5 G/DL (ref 3.5–5.2)
ALP SERPL-CCNC: 75 U/L (ref 35–104)
ALT SERPL-CCNC: 52 U/L (ref 0–32)
ANION GAP SERPL CALCULATED.3IONS-SCNC: 14 MMOL/L (ref 7–16)
AST SERPL-CCNC: 51 U/L (ref 0–31)
BILIRUB SERPL-MCNC: 0.3 MG/DL (ref 0–1.2)
BUN SERPL-MCNC: 14 MG/DL (ref 6–23)
CALCIUM SERPL-MCNC: 9.5 MG/DL (ref 8.6–10.2)
CHLORIDE SERPL-SCNC: 103 MMOL/L (ref 98–107)
CHOLEST SERPL-MCNC: 194 MG/DL
CO2 SERPL-SCNC: 24 MMOL/L (ref 22–29)
CREAT SERPL-MCNC: 0.9 MG/DL (ref 0.5–1)
FOLATE SERPL-MCNC: >20 NG/ML (ref 4.8–24.2)
GFR SERPL CREATININE-BSD FRML MDRD: >60 ML/MIN/1.73M2
GLUCOSE SERPL-MCNC: 106 MG/DL (ref 74–99)
HDLC SERPL-MCNC: 57 MG/DL
LDLC SERPL CALC-MCNC: 94 MG/DL
POTASSIUM SERPL-SCNC: 5 MMOL/L (ref 3.5–5)
PROT SERPL-MCNC: 7 G/DL (ref 6.4–8.3)
SODIUM SERPL-SCNC: 141 MMOL/L (ref 132–146)
T4 FREE SERPL-MCNC: 1.7 NG/DL (ref 0.9–1.7)
TRIGL SERPL-MCNC: 214 MG/DL
TSH SERPL DL<=0.05 MIU/L-ACNC: 4.33 UIU/ML (ref 0.27–4.2)
VIT B12 SERPL-MCNC: >2000 PG/ML (ref 211–946)
VLDLC SERPL CALC-MCNC: 43 MG/DL

## 2023-11-10 ENCOUNTER — OFFICE VISIT (OUTPATIENT)
Dept: ENT CLINIC | Age: 63
End: 2023-11-10
Payer: COMMERCIAL

## 2023-11-10 VITALS
SYSTOLIC BLOOD PRESSURE: 129 MMHG | WEIGHT: 117 LBS | DIASTOLIC BLOOD PRESSURE: 86 MMHG | BODY MASS INDEX: 20.08 KG/M2 | HEART RATE: 109 BPM

## 2023-11-10 DIAGNOSIS — R09.82 POST-NASAL DRAINAGE: ICD-10-CM

## 2023-11-10 DIAGNOSIS — R09.81 NASAL CONGESTION: Primary | ICD-10-CM

## 2023-11-10 PROCEDURE — 99213 OFFICE O/P EST LOW 20 MIN: CPT | Performed by: OTOLARYNGOLOGY

## 2023-11-10 ASSESSMENT — ENCOUNTER SYMPTOMS
VOICE CHANGE: 0
VOMITING: 0
TROUBLE SWALLOWING: 0
COUGH: 0
SHORTNESS OF BREATH: 0
RHINORRHEA: 1
SORE THROAT: 0

## 2023-11-10 NOTE — PROGRESS NOTES
Rfl: 3    azelastine (ASTELIN) 0.1 % nasal spray, 1-2 sprays by Nasal route 2 times daily as needed for Rhinitis Use in each nostril as directed, Disp: 30 mL, Rfl: 1    levalbuterol (XOPENEX HFA) 45 MCG/ACT inhaler, INHALE 1 PUFF BY MOUTH EVERY 4 HOURS AS NEEDED FOR WHEEZING, Disp: 15 g, Rfl: 5    MOVANTIK 25 MG TABS tablet, TAKE ONE TABLET BY MOUTH EVERY MORNING, Disp: 90 tablet, Rfl: 1    pantoprazole (PROTONIX) 40 MG tablet, TAKE ONE TABLET BY MOUTH EVERY DAY, Disp: 90 tablet, Rfl: 1    atorvastatin (LIPITOR) 40 MG tablet, TAKE ONE TABLET BY MOUTH EVERY DAY, Disp: 90 tablet, Rfl: 0    FOLBEE 2.5-25-1 MG TABS tablet, TAKE ONE TABLET BY MOUTH EVERY DAY, Disp: , Rfl:     DULoxetine (CYMBALTA) 20 MG extended release capsule, TAKE ONE CAPSULE BY MOUTH DAILY, Disp: 90 capsule, Rfl: 0    budesonide-formoterol (SYMBICORT) 160-4.5 MCG/ACT AERO, INHALE TWO PUFFS BY MOUTH TWO TIMES A DAY  RINSE MOUTH AFTER USE, Disp: , Rfl:     levothyroxine (SYNTHROID) 50 MCG tablet, TAKE ONE TABLET BY MOUTH EVERY DAY, Disp: 90 tablet, Rfl: 1    Calcium Carb-Cholecalciferol (CALCIUM 1000 + D) 1000-800 MG-UNIT TABS, Take 1 tablet by mouth daily, Disp: 90 tablet, Rfl: 1    albuterol sulfate HFA (VENTOLIN HFA) 108 (90 Base) MCG/ACT inhaler, Inhale 2 puffs into the lungs every 6 hours as needed for Wheezing, Disp: 18 g, Rfl: 2    albuterol (PROVENTIL) (2.5 MG/3ML) 0.083% nebulizer solution, INHALE 1/2 VIAL VIA NEBULIZER FOUR TIMES A DAY AS NEEDED FOR COUGH, Disp: 120 each, Rfl: 0    NIFEdipine (PROCARDIA) 10 MG capsule, TAKE ONE CAPSULE BY MOUTH TWO TIMES A DAY, Disp: , Rfl: 5    SPIRIVA RESPIMAT 2.5 MCG/ACT AERS inhaler, INHALE TWO PUFFS BY MOUTH once EVERY DAY, Disp: , Rfl: 5    vitamin B-12 (CYANOCOBALAMIN) 100 MCG tablet, Take 0.5 tablets by mouth daily, Disp: , Rfl:     vitamin E 400 UNIT capsule, Take 1 capsule by mouth daily, Disp: , Rfl:   Patient has no known allergies.   Social History     Tobacco Use    Smoking status: Former

## 2023-11-11 LAB
SEND OUT REPORT: NORMAL
TEST NAME: NORMAL

## 2024-03-21 ENCOUNTER — HOSPITAL ENCOUNTER (EMERGENCY)
Age: 64
Discharge: HOME | End: 2024-03-21
Attending: STUDENT IN AN ORGANIZED HEALTH CARE EDUCATION/TRAINING PROGRAM
Payer: COMMERCIAL

## 2024-03-21 ENCOUNTER — APPOINTMENT (OUTPATIENT)
Dept: CT IMAGING | Age: 64
End: 2024-03-21
Payer: COMMERCIAL

## 2024-03-21 ENCOUNTER — HOSPITAL ENCOUNTER (EMERGENCY)
Age: 64
Discharge: ANOTHER HOSPITAL | End: 2024-03-21
Payer: COMMERCIAL

## 2024-03-21 VITALS
BODY MASS INDEX: 19.74 KG/M2 | RESPIRATION RATE: 18 BRPM | HEART RATE: 114 BPM | OXYGEN SATURATION: 97 % | SYSTOLIC BLOOD PRESSURE: 151 MMHG | TEMPERATURE: 97.7 F | DIASTOLIC BLOOD PRESSURE: 95 MMHG | WEIGHT: 115 LBS

## 2024-03-21 VITALS
TEMPERATURE: 97.5 F | DIASTOLIC BLOOD PRESSURE: 91 MMHG | BODY MASS INDEX: 19.74 KG/M2 | RESPIRATION RATE: 18 BRPM | OXYGEN SATURATION: 94 % | WEIGHT: 115 LBS | SYSTOLIC BLOOD PRESSURE: 133 MMHG | HEART RATE: 91 BPM

## 2024-03-21 DIAGNOSIS — R10.9 ABDOMINAL PAIN, UNSPECIFIED ABDOMINAL LOCATION: Primary | ICD-10-CM

## 2024-03-21 DIAGNOSIS — R19.7 DIARRHEA, UNSPECIFIED TYPE: ICD-10-CM

## 2024-03-21 LAB
ALBUMIN SERPL-MCNC: 5 G/DL (ref 3.5–5.2)
ALP SERPL-CCNC: 77 U/L (ref 35–104)
ALT SERPL-CCNC: 51 U/L (ref 0–32)
ANION GAP SERPL CALCULATED.3IONS-SCNC: 13 MMOL/L (ref 7–16)
AST SERPL-CCNC: 46 U/L (ref 0–31)
BASOPHILS # BLD: 0.05 K/UL (ref 0–0.2)
BASOPHILS NFR BLD: 1 % (ref 0–2)
BILIRUB SERPL-MCNC: 0.3 MG/DL (ref 0–1.2)
BILIRUB UR QL STRIP: NEGATIVE
BUN SERPL-MCNC: 18 MG/DL (ref 6–23)
CALCIUM SERPL-MCNC: 10.2 MG/DL (ref 8.6–10.2)
CHLORIDE SERPL-SCNC: 105 MMOL/L (ref 98–107)
CLARITY UR: CLEAR
CO2 SERPL-SCNC: 25 MMOL/L (ref 22–29)
COLOR UR: YELLOW
CREAT SERPL-MCNC: 0.8 MG/DL (ref 0.5–1)
DATE, STOOL #1: NORMAL
EOSINOPHIL # BLD: 0.13 K/UL (ref 0.05–0.5)
EOSINOPHILS RELATIVE PERCENT: 2 % (ref 0–6)
ERYTHROCYTE [DISTWIDTH] IN BLOOD BY AUTOMATED COUNT: 13.6 % (ref 11.5–15)
GFR SERPL CREATININE-BSD FRML MDRD: >60 ML/MIN/1.73M2
GLUCOSE SERPL-MCNC: 108 MG/DL (ref 74–99)
GLUCOSE UR STRIP-MCNC: NEGATIVE MG/DL
HCT VFR BLD AUTO: 43.3 % (ref 34–48)
HEMOCCULT SP1 STL QL: NEGATIVE
HGB BLD-MCNC: 14.4 G/DL (ref 11.5–15.5)
HGB UR QL STRIP.AUTO: ABNORMAL
IMM GRANULOCYTES # BLD AUTO: <0.03 K/UL (ref 0–0.58)
IMM GRANULOCYTES NFR BLD: 0 % (ref 0–5)
KETONES UR STRIP-MCNC: NEGATIVE MG/DL
LACTATE BLDV-SCNC: 2.1 MMOL/L (ref 0.5–2.2)
LEUKOCYTE ESTERASE UR QL STRIP: NEGATIVE
LIPASE SERPL-CCNC: 40 U/L (ref 13–60)
LYMPHOCYTES NFR BLD: 1.54 K/UL (ref 1.5–4)
LYMPHOCYTES RELATIVE PERCENT: 27 % (ref 20–42)
MCH RBC QN AUTO: 32.8 PG (ref 26–35)
MCHC RBC AUTO-ENTMCNC: 33.3 G/DL (ref 32–34.5)
MCV RBC AUTO: 98.6 FL (ref 80–99.9)
MONOCYTES NFR BLD: 0.56 K/UL (ref 0.1–0.95)
MONOCYTES NFR BLD: 10 % (ref 2–12)
NEUTROPHILS NFR BLD: 60 % (ref 43–80)
NEUTS SEG NFR BLD: 3.48 K/UL (ref 1.8–7.3)
NITRITE UR QL STRIP: NEGATIVE
PH UR STRIP: 5.5 [PH] (ref 5–9)
PLATELET # BLD AUTO: 265 K/UL (ref 130–450)
PMV BLD AUTO: 9.7 FL (ref 7–12)
POTASSIUM SERPL-SCNC: 4.6 MMOL/L (ref 3.5–5)
PROT SERPL-MCNC: 8.2 G/DL (ref 6.4–8.3)
PROT UR STRIP-MCNC: NEGATIVE MG/DL
RBC # BLD AUTO: 4.39 M/UL (ref 3.5–5.5)
RBC #/AREA URNS HPF: ABNORMAL /HPF
SODIUM SERPL-SCNC: 143 MMOL/L (ref 132–146)
SP GR UR STRIP: 1.01 (ref 1–1.03)
TIME, STOOL #1: 1307
TROPONIN I SERPL HS-MCNC: 9 NG/L (ref 0–9)
TROPONIN I SERPL HS-MCNC: 9 NG/L (ref 0–9)
UROBILINOGEN UR STRIP-ACNC: 0.2 EU/DL (ref 0–1)
WBC #/AREA URNS HPF: ABNORMAL /HPF
WBC OTHER # BLD: 5.8 K/UL (ref 4.5–11.5)

## 2024-03-21 PROCEDURE — 93005 ELECTROCARDIOGRAM TRACING: CPT | Performed by: STUDENT IN AN ORGANIZED HEALTH CARE EDUCATION/TRAINING PROGRAM

## 2024-03-21 PROCEDURE — 2580000003 HC RX 258: Performed by: STUDENT IN AN ORGANIZED HEALTH CARE EDUCATION/TRAINING PROGRAM

## 2024-03-21 PROCEDURE — 99285 EMERGENCY DEPT VISIT HI MDM: CPT

## 2024-03-21 PROCEDURE — 87046 STOOL CULTR AEROBIC BACT EA: CPT

## 2024-03-21 PROCEDURE — 87045 FECES CULTURE AEROBIC BACT: CPT

## 2024-03-21 PROCEDURE — 83605 ASSAY OF LACTIC ACID: CPT

## 2024-03-21 PROCEDURE — 87427 SHIGA-LIKE TOXIN AG IA: CPT

## 2024-03-21 PROCEDURE — 82270 OCCULT BLOOD FECES: CPT

## 2024-03-21 PROCEDURE — 6370000000 HC RX 637 (ALT 250 FOR IP): Performed by: STUDENT IN AN ORGANIZED HEALTH CARE EDUCATION/TRAINING PROGRAM

## 2024-03-21 PROCEDURE — 83690 ASSAY OF LIPASE: CPT

## 2024-03-21 PROCEDURE — 6360000004 HC RX CONTRAST MEDICATION: Performed by: RADIOLOGY

## 2024-03-21 PROCEDURE — 6360000002 HC RX W HCPCS: Performed by: STUDENT IN AN ORGANIZED HEALTH CARE EDUCATION/TRAINING PROGRAM

## 2024-03-21 PROCEDURE — 96361 HYDRATE IV INFUSION ADD-ON: CPT

## 2024-03-21 PROCEDURE — 87449 NOS EACH ORGANISM AG IA: CPT

## 2024-03-21 PROCEDURE — 74177 CT ABD & PELVIS W/CONTRAST: CPT

## 2024-03-21 PROCEDURE — 81001 URINALYSIS AUTO W/SCOPE: CPT

## 2024-03-21 PROCEDURE — 96374 THER/PROPH/DIAG INJ IV PUSH: CPT

## 2024-03-21 PROCEDURE — 99211 OFF/OP EST MAY X REQ PHY/QHP: CPT

## 2024-03-21 PROCEDURE — 84484 ASSAY OF TROPONIN QUANT: CPT

## 2024-03-21 PROCEDURE — 85025 COMPLETE CBC W/AUTO DIFF WBC: CPT

## 2024-03-21 PROCEDURE — 80053 COMPREHEN METABOLIC PANEL: CPT

## 2024-03-21 PROCEDURE — 87324 CLOSTRIDIUM AG IA: CPT

## 2024-03-21 RX ORDER — DICYCLOMINE HYDROCHLORIDE 10 MG/1
10 CAPSULE ORAL ONCE
Status: COMPLETED | OUTPATIENT
Start: 2024-03-21 | End: 2024-03-21

## 2024-03-21 RX ORDER — 0.9 % SODIUM CHLORIDE 0.9 %
1000 INTRAVENOUS SOLUTION INTRAVENOUS ONCE
Status: COMPLETED | OUTPATIENT
Start: 2024-03-21 | End: 2024-03-21

## 2024-03-21 RX ORDER — DICYCLOMINE HCL 20 MG
20 TABLET ORAL 4 TIMES DAILY
Qty: 20 TABLET | Refills: 0 | Status: SHIPPED | OUTPATIENT
Start: 2024-03-21 | End: 2024-03-26

## 2024-03-21 RX ORDER — HYDROCODONE BITARTRATE AND ACETAMINOPHEN 5; 325 MG/1; MG/1
1 TABLET ORAL ONCE
Status: COMPLETED | OUTPATIENT
Start: 2024-03-21 | End: 2024-03-21

## 2024-03-21 RX ORDER — MORPHINE SULFATE 2 MG/ML
2 INJECTION, SOLUTION INTRAMUSCULAR; INTRAVENOUS ONCE
Status: COMPLETED | OUTPATIENT
Start: 2024-03-21 | End: 2024-03-21

## 2024-03-21 RX ADMIN — MORPHINE SULFATE 2 MG: 2 INJECTION, SOLUTION INTRAMUSCULAR; INTRAVENOUS at 12:12

## 2024-03-21 RX ADMIN — SODIUM CHLORIDE 1000 ML: 9 INJECTION, SOLUTION INTRAVENOUS at 12:12

## 2024-03-21 RX ADMIN — HYDROCODONE BITARTRATE AND ACETAMINOPHEN 1 TABLET: 5; 325 TABLET ORAL at 17:32

## 2024-03-21 RX ADMIN — IOPAMIDOL 75 ML: 755 INJECTION, SOLUTION INTRAVENOUS at 15:05

## 2024-03-21 RX ADMIN — DICYCLOMINE HYDROCHLORIDE 10 MG: 10 CAPSULE ORAL at 17:32

## 2024-03-21 ASSESSMENT — PAIN - FUNCTIONAL ASSESSMENT
PAIN_FUNCTIONAL_ASSESSMENT: 0-10
PAIN_FUNCTIONAL_ASSESSMENT: NONE - DENIES PAIN
PAIN_FUNCTIONAL_ASSESSMENT: 0-10

## 2024-03-21 ASSESSMENT — ENCOUNTER SYMPTOMS
SHORTNESS OF BREATH: 0
ABDOMINAL PAIN: 1
COUGH: 0
NAUSEA: 0
VOMITING: 0
ABDOMINAL DISTENTION: 0
CHEST TIGHTNESS: 0
PHOTOPHOBIA: 0

## 2024-03-21 ASSESSMENT — PAIN SCALES - GENERAL
PAINLEVEL_OUTOF10: 9
PAINLEVEL_OUTOF10: 9

## 2024-03-21 ASSESSMENT — PAIN DESCRIPTION - ORIENTATION: ORIENTATION: MID;UPPER

## 2024-03-21 ASSESSMENT — PAIN DESCRIPTION - LOCATION
LOCATION: ABDOMEN
LOCATION: ABDOMEN

## 2024-03-21 ASSESSMENT — PAIN DESCRIPTION - DESCRIPTORS: DESCRIPTORS: STABBING

## 2024-03-21 ASSESSMENT — LIFESTYLE VARIABLES: HOW MANY STANDARD DRINKS CONTAINING ALCOHOL DO YOU HAVE ON A TYPICAL DAY: PATIENT DOES NOT DRINK

## 2024-03-21 NOTE — ED PROVIDER NOTES
[facility] URGENT CARE  EMERGENCY DEPARTMENT ENCOUNTER        NAME: Dorinda Chavarria  :  1960  MRN:  [id number]  Date of evaluation: 3/21/2024  Provider: Arden Garcia PA-C  PCP: Casey Alexander DO  Note Started : 10:38 AM EDT 3/21/24    Chief Complaint: Abdominal Pain (Stomach pain, mid abdomen/in belly button, started Monday, diarrhea with mucus)      This is is a 64-year-old female who presents to urgent care complaining of severe abdominal pain that has been constant for the past 4 days.  She does state having some diarrhea as well.  She does state history of cholecystectomy and hernia surgeries but also a surgery to correct acid reflux due to GERD and had her lower esophagus repaired to prevent reflux.  She states as a result of the surgery she normally does not have an emesis.  She states this surgery was in the distant past.  On first contact patient she is in no acute distress but she is very uncomfortable. Patient states she is not a smoker but occasionally drinks alcohol.  Patient does state she had a colonoscopy about a month or so ago which showed some polyps.        Review of Systems  Pertinent positives and negatives are stated within HPI, all other systems reviewed and are negative.     Allergies: Patient has no known allergies.     --------------------------------------------- PAST HISTORY ---------------------------------------------  Past Medical History:  has a past medical history of Asthma, BRCA1 negative, CRISTHIAN (cerebral atherosclerosis), Chronic pain, Chronic rhinitis, CRPS (complex regional pain syndrome), lower limb, Hyperlipidemia, Raynauds syndrome, Right foot pain, RSD lower limb, Thyroid disease, and Tinnitus.    Past Surgical History:  has a past surgical history that includes  section (, ); Tubal ligation (); Hemorrhoid surgery (); Bunionectomy (); Esophagus surgery (); Endometrial ablation (); Nerve Block (12);  Nerve Block (9 19 12); Nerve Block (09 26 2012); Nerve Block (10-01-12); Nerve Block (10-10-12); Nerve Block (10/24/12); Nerve Block (11/12/12); Nerve Block (11-14-12); Nerve Block (Left, 7/23/14); Nerve Block (07/30/14); Nerve Block (Left, 8/6/14); Nerve Block (Left, 9/5/2014); Nerve Block (Left, 09/17/14); Nerve Block (Left, 10 8 14); Nerve Block (N/A, 07/06/2016); Nerve Block (07/13/2016); Nerve Block (Right, 07/20/2016); Nerve Block (Right, 08/10/2016); Nerve Block (Right, 08/17/2016); Nerve Block (Right, 08/24/2016); other surgical history (03/27/2017); Breast surgery (2009); hernia repair; Foot surgery (Right, 8/26/2020); Upper gastrointestinal endoscopy (N/A, 5/3/2022); and Cholecystectomy, laparoscopic (N/A, 6/15/2022).    Social History:  reports that she quit smoking about 7 years ago. Her smoking use included cigarettes. She started smoking about 37 years ago. She has a 15.0 pack-year smoking history. She has never used smokeless tobacco. She reports current alcohol use of about 2.0 standard drinks of alcohol per week. She reports that she does not use drugs.    Family History: family history includes Breast Cancer in her paternal aunt; Cancer in her father; Hypertension in her mother; Kidney Disease in her mother; Ovarian Cancer in her paternal aunt.     The patient’s home medications have been reviewed.    The nursing notes within the ED encounter have been reviewed.     ------------------------------------------------SCREENINGS----------------------------------------------                        CIWA Assessment  BP: (!) 151/95  Pulse: (!) 114           ---------------------------------------------PHYSICAL EXAM --------------------------------------------    Vitals:    03/21/24 1041 03/21/24 1042   BP:  (!) 151/95   Pulse:  (!) 114   Resp:  18   Temp:  97.7 °F (36.5 °C)   SpO2:  97%   Weight: 52.2 kg (115 lb)      Oxygen Saturation Interpretation: Normal     Physical Exam  Vitals and nursing note

## 2024-03-21 NOTE — ED PROVIDER NOTES
Dorinda Chavarria is a 64-year-old female present emergency department with concern for periumbilical stabbing abdominal pain.  Patient states that last week Tuesday she began to have nausea vomiting and diarrhea.  Patient was having fever and chills at that time.  The nausea vomiting had resolved.  Patient then began to have a stabbing periumbilical abdominal pain.  Patient has history of cholecystectomy.  Patient states that she did have a colonoscopy last month that was normal.  Patient denies any dark black stool or blood in stool patient that she is having diffuse diarrhea with mucus denies any recent antibiotic use or history of C. difficile.  Patient not having any current nausea vomiting or fevers. She is tolerating PO.     The history is provided by the patient and medical records.        Review of Systems   Constitutional:  Negative for chills, diaphoresis, fatigue and fever.   Eyes:  Negative for photophobia and visual disturbance.   Respiratory:  Negative for cough, chest tightness and shortness of breath.    Cardiovascular:  Negative for chest pain, palpitations and leg swelling.   Gastrointestinal:  Positive for abdominal pain and diarrhea. Negative for abdominal distention, blood in stool, nausea and vomiting.   Genitourinary:  Negative for dysuria.   Musculoskeletal:  Negative for neck pain and neck stiffness.   Skin:  Negative for pallor and rash.   Neurological:  Negative for headaches.   Psychiatric/Behavioral:  Negative for confusion.         Physical Exam  Vitals and nursing note reviewed.   Constitutional:       General: She is not in acute distress.     Comments: Patient appears mildly uncomfortable due to pain   HENT:      Head: Normocephalic and atraumatic.   Eyes:      General: No scleral icterus.     Conjunctiva/sclera: Conjunctivae normal.      Pupils: Pupils are equal, round, and reactive to light.   Cardiovascular:      Rate and Rhythm: Regular rhythm. Tachycardia present.   Pulmonary:

## 2024-03-22 LAB
EKG ATRIAL RATE: 109 BPM
EKG P AXIS: 74 DEGREES
EKG P-R INTERVAL: 144 MS
EKG Q-T INTERVAL: 318 MS
EKG QRS DURATION: 62 MS
EKG QTC CALCULATION (BAZETT): 428 MS
EKG R AXIS: 67 DEGREES
EKG T AXIS: 70 DEGREES
EKG VENTRICULAR RATE: 109 BPM

## 2024-03-22 PROCEDURE — 93010 ELECTROCARDIOGRAM REPORT: CPT | Performed by: INTERNAL MEDICINE

## 2024-03-23 LAB
MICROORGANISM SPEC CULT: NORMAL
MICROORGANISM SPEC CULT: NORMAL
SPECIMEN DESCRIPTION: NORMAL

## 2024-03-26 ENCOUNTER — OFFICE VISIT (OUTPATIENT)
Dept: PODIATRY | Age: 64
End: 2024-03-26
Payer: COMMERCIAL

## 2024-03-26 VITALS — BODY MASS INDEX: 19.63 KG/M2 | HEIGHT: 64 IN | WEIGHT: 115 LBS

## 2024-03-26 DIAGNOSIS — S93.402A SPRAIN OF LEFT ANKLE, UNSPECIFIED LIGAMENT, INITIAL ENCOUNTER: Primary | ICD-10-CM

## 2024-03-26 PROCEDURE — 99204 OFFICE O/P NEW MOD 45 MIN: CPT | Performed by: PODIATRIST

## 2024-03-26 NOTE — PROGRESS NOTES
3/26/24     Dorinda Chavarria    : 1960 Sex: female   Age: 64 y.o.    Patient was referred by: None  Patient's PCP/Provider is:  Casey Alexander,     Subjective:    Patient was seen to us for evaluation regarding chronic left lower extremity symptoms.    Chief Complaint   Patient presents with    Foot Injury     Left foot        HPI: Patient did have injury she sustained at work with a shopping cart injuring her left lower extremity.  Patient was being treated with multiple conservative care options over the last several months.  Patient has been wearing custom AFO hinged devices for several years regarding gait instability issues.  Patient is having chronic swelling, discoloration, pain, and limited range of motion left ankle and subtalar joint region.  Patient stated the paresthesias and pain she is experiencing mainly into the left lower extremity has been causing significant gait abnormalities which are progressing.  She presented today to discuss other additional treatment options available and/or additional testing available.  No other additional abnormalities noted at this time.    ROS:  Const: Positives and pertinent negatives as per HPI.     Musculo: Denies symptoms other than stated above.  Neuro: Denies symptoms other than stated above.  Skin: Denies symptoms other than stated above.    Current Medications:    Current Outpatient Medications:     dicyclomine (BENTYL) 20 MG tablet, Take 1 tablet by mouth 4 times daily for 5 days, Disp: 20 tablet, Rfl: 0    azelastine (ASTELIN) 0.1 % nasal spray, INSTILL 1 TO 2 SPRAYS IN EACH NOSTRIL 2 TIMES DAILY AS NEEDED FOR RHINITIS AS DIRECTED, Disp: 30 mL, Rfl: 3    ondansetron (ZOFRAN-ODT) 4 MG disintegrating tablet, Take 1 tablet by mouth 3 times daily as needed for Nausea or Vomiting, Disp: 21 tablet, Rfl: 0    montelukast (SINGULAIR) 10 MG tablet, Take 1 tablet by mouth daily, Disp: 30 tablet, Rfl: 3    levalbuterol (XOPENEX HFA) 45 MCG/ACT inhaler,

## 2024-03-26 NOTE — PROGRESS NOTES
Patient is in today for evaluation of left foot workers comp case. Patient says she injured her foot last year on 3/20/23. Patient says the pain is in between her ankles and the top of her foot. Patient says the pain is constant and she gets pain into her toes. Pcp is Casey Alexander,   Last ov 11/6/23

## 2024-03-28 ENCOUNTER — TELEPHONE (OUTPATIENT)
Dept: VASCULAR SURGERY | Age: 64
End: 2024-03-28

## 2024-03-28 NOTE — TELEPHONE ENCOUNTER
Received referral from Dr. Alexander for Dr. Cardoza regarding PVD, left message for patient to return call to schedule.

## 2024-03-28 NOTE — TELEPHONE ENCOUNTER
Patient called in to let us know she don't have insurance at the moment. She will make an appointment after she get settled.

## 2024-04-03 DIAGNOSIS — S93.402A SPRAIN OF LEFT ANKLE, UNSPECIFIED LIGAMENT, INITIAL ENCOUNTER: Primary | ICD-10-CM

## 2024-04-11 ENCOUNTER — HOSPITAL ENCOUNTER (OUTPATIENT)
Dept: NUCLEAR MEDICINE | Age: 64
Discharge: HOME | End: 2024-04-11
Attending: PODIATRIST
Payer: COMMERCIAL

## 2024-04-11 DIAGNOSIS — S93.402A SPRAIN OF LEFT ANKLE, UNSPECIFIED LIGAMENT, INITIAL ENCOUNTER: ICD-10-CM

## 2024-04-11 PROCEDURE — A9503 TC99M MEDRONATE: HCPCS | Performed by: RADIOLOGY

## 2024-04-11 PROCEDURE — 78315 BONE IMAGING 3 PHASE: CPT

## 2024-04-11 PROCEDURE — 3430000000 HC RX DIAGNOSTIC RADIOPHARMACEUTICAL: Performed by: RADIOLOGY

## 2024-04-11 RX ORDER — TC 99M MEDRONATE 20 MG/10ML
25 INJECTION, POWDER, LYOPHILIZED, FOR SOLUTION INTRAVENOUS
Status: COMPLETED | OUTPATIENT
Start: 2024-04-11 | End: 2024-04-11

## 2024-04-11 RX ADMIN — TC 99M MEDRONATE 25 MILLICURIE: 20 INJECTION, POWDER, LYOPHILIZED, FOR SOLUTION INTRAVENOUS at 13:29

## 2024-04-23 ENCOUNTER — OFFICE VISIT (OUTPATIENT)
Dept: PODIATRY | Age: 64
End: 2024-04-23
Payer: COMMERCIAL

## 2024-04-23 VITALS — HEIGHT: 64 IN | WEIGHT: 115 LBS | BODY MASS INDEX: 19.63 KG/M2

## 2024-04-23 DIAGNOSIS — S93.402A SPRAIN OF LEFT ANKLE, UNSPECIFIED LIGAMENT, INITIAL ENCOUNTER: Primary | ICD-10-CM

## 2024-04-23 PROCEDURE — 99213 OFFICE O/P EST LOW 20 MIN: CPT | Performed by: PODIATRIST

## 2024-04-23 NOTE — PROGRESS NOTES
24     Dorinda Chavarria    : 1960   Sex: female    Age: 64 y.o.    Patient's PCP/Provider is:  Casey Alexander,     Subjective:  Patient is seen today for follow-up regarding continued care for chronic left ankle sprain.  We did discuss bone scan results with patient in detail today.  No increased abnormal uptake noted into the lower extremities.  Patient still having chronic pain, swelling into both lower extremities.  Patient is having difficulty with everyday ambulatory activities more so left lower extremity than the right.  Patient is still wearing her custom AFO devices which were fabricated by her previous physician.  No other additional abnormalities noted.    Chief Complaint   Patient presents with    Foot Pain     Sprain of left ankle-workers comp.       ROS:  Const: Positives and pertinent negatives as per HPI.    Musculo: Denies symptoms other than stated above.  Neuro: Denies symptoms other than stated above.  Skin: Denies symptoms other than stated above.    Current Medications:    Current Outpatient Medications:     azelastine (ASTELIN) 0.1 % nasal spray, INSTILL 1 TO 2 SPRAYS IN EACH NOSTRIL 2 TIMES DAILY AS NEEDED FOR RHINITIS AS DIRECTED, Disp: 30 mL, Rfl: 3    ondansetron (ZOFRAN-ODT) 4 MG disintegrating tablet, Take 1 tablet by mouth 3 times daily as needed for Nausea or Vomiting, Disp: 21 tablet, Rfl: 0    montelukast (SINGULAIR) 10 MG tablet, Take 1 tablet by mouth daily, Disp: 30 tablet, Rfl: 3    levalbuterol (XOPENEX HFA) 45 MCG/ACT inhaler, INHALE 1 PUFF BY MOUTH EVERY 4 HOURS AS NEEDED FOR WHEEZING, Disp: 15 g, Rfl: 5    MOVANTIK 25 MG TABS tablet, TAKE ONE TABLET BY MOUTH EVERY MORNING, Disp: 90 tablet, Rfl: 1    pantoprazole (PROTONIX) 40 MG tablet, TAKE ONE TABLET BY MOUTH EVERY DAY, Disp: 90 tablet, Rfl: 1    atorvastatin (LIPITOR) 40 MG tablet, TAKE ONE TABLET BY MOUTH EVERY DAY, Disp: 90 tablet, Rfl: 0    FOLBEE 2.5-25-1 MG TABS tablet, TAKE ONE TABLET BY MOUTH EVERY

## 2024-04-23 NOTE — PROGRESS NOTES
Patient here for left ankle sprain at work. Patient had bone scan to review today. Casey Alexander,  last visit 3/27/2024.   Electronically signed by Nikki Choi LPN on 4/23/2024 at 1:13 PM

## 2024-05-08 ENCOUNTER — OFFICE VISIT (OUTPATIENT)
Dept: ENT CLINIC | Age: 64
End: 2024-05-08
Payer: COMMERCIAL

## 2024-05-08 VITALS
DIASTOLIC BLOOD PRESSURE: 76 MMHG | BODY MASS INDEX: 20.59 KG/M2 | WEIGHT: 120.6 LBS | HEIGHT: 64 IN | HEART RATE: 105 BPM | SYSTOLIC BLOOD PRESSURE: 110 MMHG

## 2024-05-08 DIAGNOSIS — J34.89 NASAL OBSTRUCTION: ICD-10-CM

## 2024-05-08 DIAGNOSIS — R09.81 NASAL CONGESTION: Primary | ICD-10-CM

## 2024-05-08 DIAGNOSIS — R09.82 POST-NASAL DRAINAGE: ICD-10-CM

## 2024-05-08 DIAGNOSIS — M95.0 NASAL VALVE COLLAPSE: ICD-10-CM

## 2024-05-08 PROCEDURE — G8420 CALC BMI NORM PARAMETERS: HCPCS | Performed by: OTOLARYNGOLOGY

## 2024-05-08 PROCEDURE — 1036F TOBACCO NON-USER: CPT | Performed by: OTOLARYNGOLOGY

## 2024-05-08 PROCEDURE — 99213 OFFICE O/P EST LOW 20 MIN: CPT | Performed by: OTOLARYNGOLOGY

## 2024-05-08 PROCEDURE — G8427 DOCREV CUR MEDS BY ELIG CLIN: HCPCS | Performed by: OTOLARYNGOLOGY

## 2024-05-08 PROCEDURE — 3017F COLORECTAL CA SCREEN DOC REV: CPT | Performed by: OTOLARYNGOLOGY

## 2024-05-08 RX ORDER — AZITHROMYCIN 500 MG/1
TABLET, FILM COATED ORAL
COMMUNITY
Start: 2024-05-07

## 2024-05-15 ASSESSMENT — ENCOUNTER SYMPTOMS
RHINORRHEA: 1
SHORTNESS OF BREATH: 0
COUGH: 1
VOMITING: 0

## 2024-06-27 ENCOUNTER — OFFICE VISIT (OUTPATIENT)
Dept: PODIATRY | Age: 64
End: 2024-06-27

## 2024-06-27 VITALS — WEIGHT: 120 LBS | HEIGHT: 64 IN | BODY MASS INDEX: 20.49 KG/M2

## 2024-06-27 DIAGNOSIS — S93.402A SPRAIN OF LEFT ANKLE, UNSPECIFIED LIGAMENT, INITIAL ENCOUNTER: Primary | ICD-10-CM

## 2024-06-27 NOTE — PROGRESS NOTES
24     Dorinda Chavarria    : 1960   Sex: female    Age: 64 y.o.    Patient's PCP/Provider is:  Casey Alexander,     Subjective:  Patient is seen today for follow-up regarding continued symptoms into her left lower extremity.  Patient is still wearing the custom AFO devices previously fabricated for her.  Patient has noticed some increased swelling, discoloration, and pain into her left lower extremity.  We are trying to get noninvasive arterial studies approved due to the vascular presentation at this time to both lower extremities.  Patient denies any additional issues at this time.    Chief Complaint   Patient presents with    Foot Pain     Sprain of left foot       ROS:  Const: Positives and pertinent negatives as per HPI.    Musculo: Denies symptoms other than stated above.  Neuro: Denies symptoms other than stated above.  Skin: Denies symptoms other than stated above.    Current Medications:    Current Outpatient Medications:     azithromycin (ZITHROMAX) 500 MG tablet, , Disp: , Rfl:     azelastine (ASTELIN) 0.1 % nasal spray, INSTILL 1 TO 2 SPRAYS IN EACH NOSTRIL 2 TIMES DAILY AS NEEDED FOR RHINITIS AS DIRECTED, Disp: 30 mL, Rfl: 3    ondansetron (ZOFRAN-ODT) 4 MG disintegrating tablet, Take 1 tablet by mouth 3 times daily as needed for Nausea or Vomiting, Disp: 21 tablet, Rfl: 0    montelukast (SINGULAIR) 10 MG tablet, Take 1 tablet by mouth daily, Disp: 30 tablet, Rfl: 3    levalbuterol (XOPENEX HFA) 45 MCG/ACT inhaler, INHALE 1 PUFF BY MOUTH EVERY 4 HOURS AS NEEDED FOR WHEEZING, Disp: 15 g, Rfl: 5    MOVANTIK 25 MG TABS tablet, TAKE ONE TABLET BY MOUTH EVERY MORNING, Disp: 90 tablet, Rfl: 1    pantoprazole (PROTONIX) 40 MG tablet, TAKE ONE TABLET BY MOUTH EVERY DAY, Disp: 90 tablet, Rfl: 1    atorvastatin (LIPITOR) 40 MG tablet, TAKE ONE TABLET BY MOUTH EVERY DAY, Disp: 90 tablet, Rfl: 0    FOLBEE 2.5-25-1 MG TABS tablet, TAKE ONE TABLET BY MOUTH EVERY DAY, Disp: , Rfl:     DULoxetine

## 2024-06-27 NOTE — PROGRESS NOTES
Patient here for left ankle sprain. Patient states she is still having pain. Casey Alexander,  last visit 3/27/2024   Electronically signed by Nikki Choi LPN on 6/27/2024 at 9:08 AM

## 2024-07-09 ENCOUNTER — HOSPITAL ENCOUNTER (OUTPATIENT)
Dept: GENERAL RADIOLOGY | Age: 64
Discharge: HOME | End: 2024-07-11
Attending: INTERNAL MEDICINE
Payer: COMMERCIAL

## 2024-07-09 DIAGNOSIS — R10.33 PERIUMBILICAL PAIN: ICD-10-CM

## 2024-07-09 DIAGNOSIS — R10.13 EPIGASTRIC PAIN: ICD-10-CM

## 2024-07-09 PROCEDURE — 74250 X-RAY XM SM INT 1CNTRST STD: CPT

## 2024-07-09 PROCEDURE — 2500000003 HC RX 250 WO HCPCS: Performed by: INTERNAL MEDICINE

## 2024-07-09 RX ADMIN — BARIUM SULFATE 352 ML: 960 POWDER, FOR SUSPENSION ORAL at 09:18

## 2024-07-16 ENCOUNTER — OFFICE VISIT (OUTPATIENT)
Dept: VASCULAR SURGERY | Age: 64
End: 2024-07-16
Payer: COMMERCIAL

## 2024-07-16 VITALS — WEIGHT: 118 LBS | BODY MASS INDEX: 20.25 KG/M2

## 2024-07-16 DIAGNOSIS — I70.0 ATHEROSCLEROSIS OF ABDOMINAL AORTA (HCC): Primary | ICD-10-CM

## 2024-07-16 PROCEDURE — 3017F COLORECTAL CA SCREEN DOC REV: CPT | Performed by: NURSE PRACTITIONER

## 2024-07-16 PROCEDURE — 99213 OFFICE O/P EST LOW 20 MIN: CPT | Performed by: NURSE PRACTITIONER

## 2024-07-16 PROCEDURE — 1036F TOBACCO NON-USER: CPT | Performed by: NURSE PRACTITIONER

## 2024-07-16 PROCEDURE — G8420 CALC BMI NORM PARAMETERS: HCPCS | Performed by: NURSE PRACTITIONER

## 2024-07-16 PROCEDURE — G8427 DOCREV CUR MEDS BY ELIG CLIN: HCPCS | Performed by: NURSE PRACTITIONER

## 2024-07-16 NOTE — PROGRESS NOTES
left sympathetic    NERVE BLOCK  11/12/12    paravert sympathetic block left #7    NERVE BLOCK  11-14-12    left paravertebral sympathetic left #8    NERVE BLOCK Left 7/23/14    lumbar sympathetic #1    NERVE BLOCK  07/30/14    paravertebral sympathetic left #2    NERVE BLOCK Left 8/6/14    PARAVERTEBRAL SUMPATHETIV BLOCK #3    NERVE BLOCK Left 9/5/2014    left paravertebral sympathetic #5    NERVE BLOCK Left 09/17/14    left paravertebral sympathetic nerve block #6 9/17/14    NERVE BLOCK Left 10 8 14    paravert sympathetic #7    NERVE BLOCK N/A 07/06/2016    sympathetic #1    NERVE BLOCK  07/13/2016    right parasympathic nerve block lumbar #2    NERVE BLOCK Right 07/20/2016    paravertebral sympathetic right #3    NERVE BLOCK Right 08/10/2016    lumbar parasympathetic block #4    NERVE BLOCK Right 08/17/2016    paravertebral sympathetic right #5    NERVE BLOCK Right 08/24/2016    paravertebral sympathetic right #6    OTHER SURGICAL HISTORY  03/27/2017    laparscopic incisional hernia repair with mesh    TUBAL LIGATION  1982    UPPER GASTROINTESTINAL ENDOSCOPY N/A 5/3/2022    EGD BIOPSY performed by Moses Goldberg MD at [facility] ENDOSCOPY     Current Medications:   Prior to Admission medications    Medication Sig Start Date End Date Taking? Authorizing Provider   azithromycin (ZITHROMAX) 500 MG tablet  5/7/24  Yes Provider, MD Emory   azelastine (ASTELIN) 0.1 % nasal spray INSTILL 1 TO 2 SPRAYS IN EACH NOSTRIL 2 TIMES DAILY AS NEEDED FOR RHINITIS AS DIRECTED 12/18/23  Yes Reji Borjas, APRN - CNP   montelukast (SINGULAIR) 10 MG tablet Take 1 tablet by mouth daily 3/17/23  Yes Silas Muse, DO   levalbuterol (XOPENEX HFA) 45 MCG/ACT inhaler INHALE 1 PUFF BY MOUTH EVERY 4 HOURS AS NEEDED FOR WHEEZING 8/9/22  Yes Robbie Robledo, DO   MOVANTIK 25 MG TABS tablet TAKE ONE TABLET BY MOUTH EVERY MORNING 8/1/22  Yes Robbie Robledo,    pantoprazole (PROTONIX) 40 MG tablet TAKE ONE TABLET BY MOUTH

## 2024-09-18 ENCOUNTER — OFFICE VISIT (OUTPATIENT)
Dept: PODIATRY | Age: 64
End: 2024-09-18
Payer: COMMERCIAL

## 2024-09-18 VITALS — BODY MASS INDEX: 20.14 KG/M2 | WEIGHT: 118 LBS | HEIGHT: 64 IN

## 2024-09-18 DIAGNOSIS — S93.402A SPRAIN OF LEFT ANKLE, UNSPECIFIED LIGAMENT, INITIAL ENCOUNTER: Primary | ICD-10-CM

## 2024-09-18 PROCEDURE — 99213 OFFICE O/P EST LOW 20 MIN: CPT | Performed by: PODIATRIST

## 2024-09-23 NOTE — TELEPHONE ENCOUNTER
----- Message from Roberto Ackerman DO sent at 4/22/2022  8:59 AM EDT -----  Test results are normal please notify patient. Action 1: Continue

## 2024-10-01 ENCOUNTER — TELEPHONE (OUTPATIENT)
Dept: ORTHOPEDIC SURGERY | Age: 64
End: 2024-10-01

## 2024-10-01 NOTE — TELEPHONE ENCOUNTER
Received a call from Oksana from [facility] and she stated that the AFO is being denied because the treatment is for a non allowed condition in claim,she also stated that brace is 3 years old and that Dorinda's injury was 03-20-23 so she already had the brace for a prior/previous injury(not work related).  I would suggest that patient gets AFO on her private Insurance,if I can be of any further assistance please let me know.  Susan

## 2024-10-08 DIAGNOSIS — M79.605 PAIN IN BOTH LOWER EXTREMITIES: ICD-10-CM

## 2024-10-08 DIAGNOSIS — R26.2 DIFFICULTY WALKING: ICD-10-CM

## 2024-10-08 DIAGNOSIS — S93.401D SPRAIN OF RIGHT ANKLE, UNSPECIFIED LIGAMENT, SUBSEQUENT ENCOUNTER: ICD-10-CM

## 2024-10-08 DIAGNOSIS — I73.9 PVD (PERIPHERAL VASCULAR DISEASE) (HCC): Primary | ICD-10-CM

## 2024-10-08 DIAGNOSIS — S93.402A SPRAIN OF LEFT ANKLE, UNSPECIFIED LIGAMENT, INITIAL ENCOUNTER: ICD-10-CM

## 2024-10-08 DIAGNOSIS — M79.604 PAIN IN BOTH LOWER EXTREMITIES: ICD-10-CM

## 2024-10-08 DIAGNOSIS — M25.571 PAIN IN RIGHT ANKLE AND JOINTS OF RIGHT FOOT: ICD-10-CM

## 2024-10-08 DIAGNOSIS — M25.572 PAIN IN LEFT ANKLE AND JOINTS OF LEFT FOOT: ICD-10-CM

## 2024-11-08 ENCOUNTER — OFFICE VISIT (OUTPATIENT)
Dept: ENT CLINIC | Age: 64
End: 2024-11-08

## 2024-11-08 VITALS
BODY MASS INDEX: 20.35 KG/M2 | SYSTOLIC BLOOD PRESSURE: 140 MMHG | HEART RATE: 98 BPM | HEIGHT: 64 IN | WEIGHT: 119.2 LBS | DIASTOLIC BLOOD PRESSURE: 92 MMHG | RESPIRATION RATE: 18 BRPM

## 2024-11-08 DIAGNOSIS — R09.A2 GLOBUS SENSATION: ICD-10-CM

## 2024-11-08 DIAGNOSIS — J34.89 NASAL OBSTRUCTION: ICD-10-CM

## 2024-11-08 DIAGNOSIS — R09.82 POST-NASAL DRAINAGE: ICD-10-CM

## 2024-11-08 DIAGNOSIS — R09.81 NASAL CONGESTION: ICD-10-CM

## 2024-11-08 DIAGNOSIS — K21.9 LARYNGOPHARYNGEAL REFLUX (LPR): Primary | ICD-10-CM

## 2024-11-08 ASSESSMENT — ENCOUNTER SYMPTOMS
VOICE CHANGE: 0
SORE THROAT: 0
SINUS PAIN: 0
SINUS PRESSURE: 0
TROUBLE SWALLOWING: 0
CHOKING: 0

## 2024-11-08 NOTE — PROGRESS NOTES
tablet by mouth 3 times daily as needed for Nausea or Vomiting, Disp: 21 tablet, Rfl: 0    montelukast (SINGULAIR) 10 MG tablet, Take 1 tablet by mouth daily, Disp: 30 tablet, Rfl: 3    levalbuterol (XOPENEX HFA) 45 MCG/ACT inhaler, INHALE 1 PUFF BY MOUTH EVERY 4 HOURS AS NEEDED FOR WHEEZING, Disp: 15 g, Rfl: 5    MOVANTIK 25 MG TABS tablet, TAKE ONE TABLET BY MOUTH EVERY MORNING, Disp: 90 tablet, Rfl: 1    pantoprazole (PROTONIX) 40 MG tablet, TAKE ONE TABLET BY MOUTH EVERY DAY, Disp: 90 tablet, Rfl: 1    atorvastatin (LIPITOR) 40 MG tablet, TAKE ONE TABLET BY MOUTH EVERY DAY, Disp: 90 tablet, Rfl: 0    FOLBEE 2.5-25-1 MG TABS tablet, TAKE ONE TABLET BY MOUTH EVERY DAY, Disp: , Rfl:     DULoxetine (CYMBALTA) 20 MG extended release capsule, TAKE ONE CAPSULE BY MOUTH DAILY, Disp: 90 capsule, Rfl: 0    budesonide-formoterol (SYMBICORT) 160-4.5 MCG/ACT AERO, INHALE TWO PUFFS BY MOUTH TWO TIMES A DAY  RINSE MOUTH AFTER USE, Disp: , Rfl:     levothyroxine (SYNTHROID) 50 MCG tablet, TAKE ONE TABLET BY MOUTH EVERY DAY, Disp: 90 tablet, Rfl: 1    Calcium Carb-Cholecalciferol (CALCIUM 1000 + D) 1000-800 MG-UNIT TABS, Take 1 tablet by mouth daily, Disp: 90 tablet, Rfl: 1    albuterol sulfate HFA (VENTOLIN HFA) 108 (90 Base) MCG/ACT inhaler, Inhale 2 puffs into the lungs every 6 hours as needed for Wheezing, Disp: 18 g, Rfl: 2    albuterol (PROVENTIL) (2.5 MG/3ML) 0.083% nebulizer solution, INHALE 1/2 VIAL VIA NEBULIZER FOUR TIMES A DAY AS NEEDED FOR COUGH, Disp: 120 each, Rfl: 0    NIFEdipine (PROCARDIA) 10 MG capsule, TAKE ONE CAPSULE BY MOUTH TWO TIMES A DAY, Disp: , Rfl: 5    SPIRIVA RESPIMAT 2.5 MCG/ACT AERS inhaler, INHALE TWO PUFFS BY MOUTH once EVERY DAY, Disp: , Rfl: 5    vitamin B-12 (CYANOCOBALAMIN) 100 MCG tablet, Take 0.5 tablets by mouth daily, Disp: , Rfl:     vitamin E 400 UNIT capsule, Take 1 capsule by mouth daily, Disp: , Rfl:     dicyclomine (BENTYL) 20 MG tablet, Take 1 tablet by mouth 4 times daily for 5

## 2024-12-19 ENCOUNTER — TELEPHONE (OUTPATIENT)
Dept: INTERVENTIONAL RADIOLOGY/VASCULAR | Age: 64
End: 2024-12-19

## 2024-12-19 NOTE — TELEPHONE ENCOUNTER
Spoke with patient and confirmed vascular testing appointment on 12/20/2024 at 1:00 pm. Instructed patient to arrive 15 minutes prior to appointment time, Enter through Entrance B, register to right of entrance, and report to Cardiac Services for test.

## 2024-12-20 ENCOUNTER — HOSPITAL ENCOUNTER (OUTPATIENT)
Dept: INTERVENTIONAL RADIOLOGY/VASCULAR | Age: 64
Discharge: HOME | End: 2024-12-22
Payer: COMMERCIAL

## 2024-12-20 DIAGNOSIS — R26.2 DIFFICULTY WALKING: ICD-10-CM

## 2024-12-20 DIAGNOSIS — M79.604 PAIN IN BOTH LOWER EXTREMITIES: ICD-10-CM

## 2024-12-20 DIAGNOSIS — M79.605 PAIN IN BOTH LOWER EXTREMITIES: ICD-10-CM

## 2024-12-20 PROCEDURE — 93923 UPR/LXTR ART STDY 3+ LVLS: CPT

## 2025-01-09 ENCOUNTER — OFFICE VISIT (OUTPATIENT)
Dept: PODIATRY | Age: 65
End: 2025-01-09
Payer: COMMERCIAL

## 2025-01-09 VITALS — WEIGHT: 117 LBS | HEIGHT: 64 IN | BODY MASS INDEX: 19.97 KG/M2

## 2025-01-09 DIAGNOSIS — L81.9 DISCOLORATION OF SKIN OF TOE: ICD-10-CM

## 2025-01-09 DIAGNOSIS — M79.604 PAIN IN BOTH LOWER EXTREMITIES: ICD-10-CM

## 2025-01-09 DIAGNOSIS — I73.9 PVD (PERIPHERAL VASCULAR DISEASE) (HCC): Primary | ICD-10-CM

## 2025-01-09 DIAGNOSIS — M79.605 PAIN IN BOTH LOWER EXTREMITIES: ICD-10-CM

## 2025-01-09 PROCEDURE — 3017F COLORECTAL CA SCREEN DOC REV: CPT | Performed by: PODIATRIST

## 2025-01-09 PROCEDURE — G8420 CALC BMI NORM PARAMETERS: HCPCS | Performed by: PODIATRIST

## 2025-01-09 PROCEDURE — 1036F TOBACCO NON-USER: CPT | Performed by: PODIATRIST

## 2025-01-09 PROCEDURE — 99213 OFFICE O/P EST LOW 20 MIN: CPT | Performed by: PODIATRIST

## 2025-01-09 PROCEDURE — G8427 DOCREV CUR MEDS BY ELIG CLIN: HCPCS | Performed by: PODIATRIST

## 2025-01-09 NOTE — PROGRESS NOTES
Patient is in today to discuss results of vascular studies. Pcp is Casey Alexander DO  Last ov 3/27/24

## 2025-01-09 NOTE — PROGRESS NOTES
25     Dorinda Chavarria    : 1960   Sex: female    Age: 65 y.o.    Patient's PCP/Provider is:  Casey Alexander,     Subjective:  Patient is seen today for follow-up regarding continued issues regarding pain into both lower extremities.  Patient presents today to discuss her vascular results.  She denies any additional abnormalities at this time.  Patient is still wearing the supportive shoe gear and bracing from additional abnormalities.  No other additional issues noted.    Chief Complaint   Patient presents with    Follow-up     Vascular results        ROS:  Const: Positives and pertinent negatives as per HPI.    Musculo: Denies symptoms other than stated above.  Neuro: Denies symptoms other than stated above.  Skin: Denies symptoms other than stated above.    Current Medications:    Current Outpatient Medications:     azithromycin (ZITHROMAX) 500 MG tablet, , Disp: , Rfl:     azelastine (ASTELIN) 0.1 % nasal spray, INSTILL 1 TO 2 SPRAYS IN EACH NOSTRIL 2 TIMES DAILY AS NEEDED FOR RHINITIS AS DIRECTED, Disp: 30 mL, Rfl: 3    ondansetron (ZOFRAN-ODT) 4 MG disintegrating tablet, Take 1 tablet by mouth 3 times daily as needed for Nausea or Vomiting, Disp: 21 tablet, Rfl: 0    montelukast (SINGULAIR) 10 MG tablet, Take 1 tablet by mouth daily, Disp: 30 tablet, Rfl: 3    levalbuterol (XOPENEX HFA) 45 MCG/ACT inhaler, INHALE 1 PUFF BY MOUTH EVERY 4 HOURS AS NEEDED FOR WHEEZING, Disp: 15 g, Rfl: 5    MOVANTIK 25 MG TABS tablet, TAKE ONE TABLET BY MOUTH EVERY MORNING, Disp: 90 tablet, Rfl: 1    pantoprazole (PROTONIX) 40 MG tablet, TAKE ONE TABLET BY MOUTH EVERY DAY, Disp: 90 tablet, Rfl: 1    atorvastatin (LIPITOR) 40 MG tablet, TAKE ONE TABLET BY MOUTH EVERY DAY, Disp: 90 tablet, Rfl: 0    FOLBEE 2.5-25-1 MG TABS tablet, TAKE ONE TABLET BY MOUTH EVERY DAY, Disp: , Rfl:     DULoxetine (CYMBALTA) 20 MG extended release capsule, TAKE ONE CAPSULE BY MOUTH DAILY, Disp: 90 capsule, Rfl: 0

## 2025-01-13 ENCOUNTER — TELEPHONE (OUTPATIENT)
Dept: VASCULAR SURGERY | Age: 65
End: 2025-01-13

## 2025-01-30 ENCOUNTER — HOSPITAL ENCOUNTER (OUTPATIENT)
Dept: MAMMOGRAPHY | Age: 65
Discharge: HOME | End: 2025-02-01
Payer: COMMERCIAL

## 2025-01-30 DIAGNOSIS — Z12.31 SCREENING MAMMOGRAM, ENCOUNTER FOR: ICD-10-CM

## 2025-01-30 PROCEDURE — 77063 BREAST TOMOSYNTHESIS BI: CPT

## 2025-01-31 ENCOUNTER — TELEPHONE (OUTPATIENT)
Dept: MAMMOGRAPHY | Age: 65
End: 2025-01-31

## 2025-02-03 ENCOUNTER — TELEPHONE (OUTPATIENT)
Dept: MAMMOGRAPHY | Age: 65
End: 2025-02-03

## 2025-02-03 NOTE — TELEPHONE ENCOUNTER
Call to patient in reference to her mammogram performed at [facility] on 1/30/25. Instructed patient that the radiologist has recommended additional breast imaging in order to make a final determination and further recommendations. Patient verbalized understanding and is agreeable to proceed at [facility]. Orders received per Dr. Alexander and scanned into media. Patient scheduled for 2/6/25 at 1:30 pm. CHOCO Barrett, RN -Breast Navigator

## 2025-02-04 ENCOUNTER — OFFICE VISIT (OUTPATIENT)
Dept: VASCULAR SURGERY | Age: 65
End: 2025-02-04
Payer: COMMERCIAL

## 2025-02-04 DIAGNOSIS — I73.00 RAYNAUD'S DISEASE WITHOUT GANGRENE: Primary | ICD-10-CM

## 2025-02-04 PROCEDURE — 1036F TOBACCO NON-USER: CPT

## 2025-02-04 PROCEDURE — G8420 CALC BMI NORM PARAMETERS: HCPCS

## 2025-02-04 PROCEDURE — 3017F COLORECTAL CA SCREEN DOC REV: CPT

## 2025-02-04 PROCEDURE — G8427 DOCREV CUR MEDS BY ELIG CLIN: HCPCS

## 2025-02-04 PROCEDURE — 99213 OFFICE O/P EST LOW 20 MIN: CPT

## 2025-02-04 RX ORDER — HYDROCODONE BITARTRATE AND ACETAMINOPHEN 5; 325 MG/1; MG/1
TABLET ORAL
COMMUNITY
Start: 2025-01-02

## 2025-02-04 RX ORDER — ALENDRONATE SODIUM 70 MG/1
TABLET ORAL
COMMUNITY
Start: 2025-01-15

## 2025-02-04 NOTE — PROGRESS NOTES
Vascular Surgery Outpatient Progress Note      Chief Complaint   Patient presents with    Circulatory Problem     PVD       HISTORY OF PRESENT ILLNESS:                The patient is a 65 y.o. female who returns for evaluation of lower extremity numbness.  Pt states she started with a new podiatrist who ordered arterial studies and was not happy with the result.  For that reason she was sent back to our office.  The patient denies any symptoms of claudication, rest pain, or open wounds.  She states she has a history of Raynaud's.  She was on nifedipine in the past however it made her drowsy.  She does not feel her symptoms are severe enough that she needs to restart medication.      Past Medical History:        Diagnosis Date    Asthma     controlled with inhalers     BRCA1 negative     Patient thinks she had the genetic testing but don't know the results     CRISTHIAN (cerebral atherosclerosis)     Chronic pain     Chronic rhinitis     CRPS (complex regional pain syndrome), lower limb     left foot    Hyperlipidemia     Raynauds syndrome 2019    Right foot pain     for OR 20     RSD lower limb     left foot    Thyroid disease     Tinnitus      Past Surgical History:        Procedure Laterality Date    BREAST SURGERY  2009    lump left breast    BUNIONECTOMY      left     SECTION  1982    CHOLECYSTECTOMY, LAPAROSCOPIC N/A 6/15/2022    LAPAROSCOPIC CHOLECYSTECTOMY performed by Moses Goldberg MD at [facility] OR    ENDOMETRIAL ABLATION      ESOPHAGUS SURGERY      due to gerd    FOOT SURGERY Right 2020    TARSAL TUNNEL RELEASE RIGHT FOOT performed by Aron Vitale DPM at [facility] OR    HEMORRHOID SURGERY      HERNIA REPAIR      NERVE BLOCK  12    paravertebral sympathetic left side #1    NERVE BLOCK  12    paravert sympathetic left    NERVE BLOCK  2012    left paravertebral sympathetic #3    NERVE BLOCK  10-01-12    left paravertebral sympathetic  #4    NERVE BLOCK

## 2025-02-06 ENCOUNTER — HOSPITAL ENCOUNTER (OUTPATIENT)
Dept: MAMMOGRAPHY | Age: 65
Discharge: HOME | End: 2025-02-08
Payer: COMMERCIAL

## 2025-02-06 ENCOUNTER — HOSPITAL ENCOUNTER (OUTPATIENT)
Dept: ULTRASOUND IMAGING | Age: 65
Discharge: HOME | End: 2025-02-06
Payer: COMMERCIAL

## 2025-02-06 DIAGNOSIS — R92.8 ABNORMAL MAMMOGRAM: ICD-10-CM

## 2025-02-06 PROCEDURE — 76642 ULTRASOUND BREAST LIMITED: CPT

## 2025-02-06 PROCEDURE — G0279 TOMOSYNTHESIS, MAMMO: HCPCS

## 2025-02-18 LAB
BASOPHILS # BLD: 0.05 K/UL (ref 0–0.2)
BASOPHILS NFR BLD: 1 % (ref 0–2)
EOSINOPHIL # BLD: 0.18 K/UL (ref 0.05–0.5)
EOSINOPHILS RELATIVE PERCENT: 3 % (ref 0–6)
ERYTHROCYTE [DISTWIDTH] IN BLOOD BY AUTOMATED COUNT: 12.7 % (ref 11.5–15)
HCT VFR BLD AUTO: 41.5 % (ref 34–48)
HGB BLD-MCNC: 13.3 G/DL (ref 11.5–15.5)
IMM GRANULOCYTES # BLD AUTO: <0.03 K/UL (ref 0–0.58)
IMM GRANULOCYTES NFR BLD: 0 % (ref 0–5)
LYMPHOCYTES NFR BLD: 1.46 K/UL (ref 1.5–4)
LYMPHOCYTES RELATIVE PERCENT: 26 % (ref 20–42)
MCH RBC QN AUTO: 31.9 PG (ref 26–35)
MCHC RBC AUTO-ENTMCNC: 32 G/DL (ref 32–34.5)
MCV RBC AUTO: 99.5 FL (ref 80–99.9)
MONOCYTES NFR BLD: 0.62 K/UL (ref 0.1–0.95)
MONOCYTES NFR BLD: 11 % (ref 2–12)
NEUTROPHILS NFR BLD: 58 % (ref 43–80)
NEUTS SEG NFR BLD: 3.21 K/UL (ref 1.8–7.3)
PLATELET # BLD AUTO: 221 K/UL (ref 130–450)
PMV BLD AUTO: 10.5 FL (ref 7–12)
RBC # BLD AUTO: 4.17 M/UL (ref 3.5–5.5)
WBC OTHER # BLD: 5.5 K/UL (ref 4.5–11.5)

## 2025-02-21 LAB
A ALTERNATA IGE QN: <0.1 KU/L (ref 0–0.34)
A FUMIGATUS IGE QN: 0.29 KU/L (ref 0–0.34)
ALLERGEN BIRCH IGE: <0.1 KU/L (ref 0–0.34)
BERMUDA GRASS IGE QN: <0.1 KU/L (ref 0–0.34)
BOXELDER IGE QN: <0.1 KU/L (ref 0–0.34)
C HERBARUM IGE QN: <0.1 KUL/L (ref 0–0.34)
CALIF WALNUT POLN IGE QN: <0.1 KU/L (ref 0–0.34)
CAT DANDER IGE QN: <0.1 KU/L (ref 0–0.34)
CMN PIGWEED IGE QN: <0.1 KU/L (ref 0–0.34)
COMMON RAGWEED IGE QN: <0.1 KU/L (ref 0–0.34)
COTTONWOOD IGE QN: <0.1 KU/L (ref 0–0.34)
D FARINAE IGE QN: <0.1 KU/L (ref 0–0.34)
D PTERONYSS IGE QN: <0.1 KU/L (ref 0–0.34)
DOG DANDER IGE QN: <0.1 KU/L (ref 0–0.34)
IGE SERPL-ACNC: 205 IU/ML (ref 0–100)
LONDON PLANE IGE QN: <0.1 KU/L (ref 0–0.34)
M RACEMOSUS IGE QN: <0.1 KU/L (ref 0–0.34)
MOUSE EPITH IGE QN: <0.1 KU/L (ref 0–0.34)
MT JUNIPER IGE QN: <0.1 KU/L (ref 0–0.34)
P NOTATUM IGE QN: <0.1 KU/L (ref 0–0.34)
PECAN/HICK TREE IGE QN: <0.1 KU/L (ref 0–0.34)
ROACH IGE QN: <0.1 KU/L (ref 0–0.34)
SALTWORT IGE QN: <0.1 KU/L (ref 0–0.34)
SHEEP SORREL IGE QN: <0.1 KU/L (ref 0–0.34)
TIMOTHY IGE QN: <0.1 KU/L (ref 0–0.34)
WHITE ASH IGE QN: <0.1 KU/L (ref 0–0.34)
WHITE ELM IGE QN: <0.1 KU/L (ref 0–0.34)
WHITE MULBERRY IGE QN: <0.1 KU/L (ref 0–0.34)
WHITE OAK IGE QN: <0.1 KU/L (ref 0–0.34)

## 2025-02-22 LAB
IGG 1: 273 MG/DL (ref 240–1118)
IGG 2: 132 MG/DL (ref 124–549)
IGG 3: 117 MG/DL (ref 21–134)
IGG4 SER-MCNC: 55 MG/DL (ref 1–123)

## 2025-03-17 ENCOUNTER — OFFICE VISIT (OUTPATIENT)
Dept: ORTHOPEDIC SURGERY | Age: 65
End: 2025-03-17
Payer: COMMERCIAL

## 2025-03-17 VITALS — BODY MASS INDEX: 19.97 KG/M2 | TEMPERATURE: 98.6 F | WEIGHT: 117 LBS | HEIGHT: 64 IN

## 2025-03-17 DIAGNOSIS — S66.811A EXTENSOR TENDON RUPTURE OF HAND, RIGHT, INITIAL ENCOUNTER: ICD-10-CM

## 2025-03-17 DIAGNOSIS — G56.01 RIGHT CARPAL TUNNEL SYNDROME: Primary | ICD-10-CM

## 2025-03-17 DIAGNOSIS — G56.02 LEFT CARPAL TUNNEL SYNDROME: ICD-10-CM

## 2025-03-17 PROCEDURE — G8420 CALC BMI NORM PARAMETERS: HCPCS | Performed by: ORTHOPAEDIC SURGERY

## 2025-03-17 PROCEDURE — G8427 DOCREV CUR MEDS BY ELIG CLIN: HCPCS | Performed by: ORTHOPAEDIC SURGERY

## 2025-03-17 PROCEDURE — 1036F TOBACCO NON-USER: CPT | Performed by: ORTHOPAEDIC SURGERY

## 2025-03-17 PROCEDURE — 99204 OFFICE O/P NEW MOD 45 MIN: CPT | Performed by: ORTHOPAEDIC SURGERY

## 2025-03-17 PROCEDURE — 3017F COLORECTAL CA SCREEN DOC REV: CPT | Performed by: ORTHOPAEDIC SURGERY

## 2025-03-17 NOTE — PROGRESS NOTES
Chief Complaint   Patient presents with    Wrist Pain     Bilateral wrist pain. Patient reports right is worse than left. Pain began years ago and has worsened recently. Pain described as aching. Numbness and tingling in right hand. Pain radiates to the shoulder on the right. Pain level today 8/10.        Dorinda Chavarria is a 65 y.o. year old  female who presents for evaluation of bilateral wrist pain R>L.  she reports this started a few years ago.  she does not remember a specific injury that started the pain.  She reports numbness and tingling in the first three fingers.  She also complains of pain in the knuckles. She did have a prior injury on the right index finger years ago. The injury was none.  The pain is worse with movement, lifting and better with rest.  The patient has tried OTC analgesics, bracing for at least 6 months, HEP.  The treatment has not been effective.  The patient is right dominant.      Past Medical History:   Diagnosis Date    Asthma     controlled with inhalers     BRCA1 negative     Patient thinks she had the genetic testing but don't know the results     CRISTHIAN (cerebral atherosclerosis)     Chronic pain     Chronic rhinitis     CRPS (complex regional pain syndrome), lower limb     left foot    Hyperlipidemia     Raynauds syndrome     Right foot pain     for OR 20     RSD lower limb     left foot    Thyroid disease     Tinnitus      Past Surgical History:   Procedure Laterality Date    BREAST SURGERY  2009    lump left breast    BUNIONECTOMY  2011    left     SECTION  ,     CHOLECYSTECTOMY, LAPAROSCOPIC N/A 6/15/2022    LAPAROSCOPIC CHOLECYSTECTOMY performed by Moses Goldberg MD at [facility] OR    ENDOMETRIAL ABLATION      ESOPHAGUS SURGERY      due to gerd    FOOT SURGERY Right 2020    TARSAL TUNNEL RELEASE RIGHT FOOT performed by Aron Vitale DPM at [facility] OR    HEMORRHOID SURGERY      HERNIA REPAIR      NERVE BLOCK  12     17-year-old male no significant past medical history presenting today with chronic constipation.  Father admits recent travel to [place] approximately 3 weeks ago, since then has been having difficulty stooling.  Here today because patient noticed a drop of blood on his poop 2 days ago.  Father gave Dulcolax this morning, now patient has diarrhea.  Patient admits to typically his stool is [place] 1, taking 1 capful of MiraLAX in 8 ounces of water the last 9 days.  Has appointment to gastroenterologist in 3 days.  Tolerating normal p.o.  Had fever and vomiting 2 days ago, now resolved.  Denies any black/tarry stools, profuse bleeding from rectum, dizziness, shortness of breath.  Vaccinations up-to-date.17-year-old male no significant past medical history presenting today with chronic constipation.  Father admits recent travel to [place] approximately 3 weeks ago, since then has been having difficulty stooling.  Here today because patient noticed a drop of blood on his poop 2 days ago.  Father gave Dulcolax this morning, now patient has diarrhea.  Patient admits to typically his stool is [place] 1, taking 1 capful of MiraLAX in 8 ounces of water the last 9 days.  Has appointment to gastroenterologist in 3 days.  Tolerating normal p.o.  Had fever and vomiting 2 days ago, now resolved.  Denies any black/tarry stools, profuse bleeding from rectum, dizziness, shortness of breath.  Vaccinations up-to-date.

## 2025-03-21 ENCOUNTER — PREP FOR PROCEDURE (OUTPATIENT)
Dept: ORTHOPEDIC SURGERY | Age: 65
End: 2025-03-21

## 2025-03-21 DIAGNOSIS — G56.01 RIGHT CARPAL TUNNEL SYNDROME: ICD-10-CM

## 2025-03-28 LAB
CLINICAL INFORMATION: NORMAL
COMMENT: NORMAL
DATE OF PREVIOUS BIOPSY: NORMAL
DATE OF PREVIOUS PAP: NORMAL
HB: COMMENT: NORMAL
HB: CYTOTECHNOLT: NORMAL
HB: INTERPT: NORMAL
HB: SOURCE: NORMAL
HPV MRNA E6/E7: NOT DETECTED
LAST MENSTRUAL PERIOD: NORMAL
SPECIMEN ADEQUACY:: NORMAL

## 2025-04-15 RX ORDER — PREDNISONE 5 MG/1
5 TABLET ORAL DAILY
COMMUNITY
Start: 2025-02-13

## 2025-04-15 RX ORDER — MULTIVIT-MIN/IRON/FOLIC ACID/K 18-600-40
2000 CAPSULE ORAL DAILY
COMMUNITY

## 2025-04-15 RX ORDER — CYCLOBENZAPRINE HCL 5 MG
5 TABLET ORAL 3 TIMES DAILY PRN
COMMUNITY

## 2025-04-16 ENCOUNTER — HOSPITAL ENCOUNTER (OUTPATIENT)
Age: 65
Discharge: HOME | End: 2025-04-16
Payer: COMMERCIAL

## 2025-04-16 ENCOUNTER — ANESTHESIA EVENT (OUTPATIENT)
Dept: OPERATING ROOM | Age: 65
End: 2025-04-16
Payer: COMMERCIAL

## 2025-04-16 LAB
EKG ATRIAL RATE: 100 BPM
EKG P AXIS: 79 DEGREES
EKG P-R INTERVAL: 142 MS
EKG Q-T INTERVAL: 338 MS
EKG QRS DURATION: 76 MS
EKG QTC CALCULATION (BAZETT): 436 MS
EKG R AXIS: 64 DEGREES
EKG T AXIS: 69 DEGREES
EKG VENTRICULAR RATE: 100 BPM

## 2025-04-16 PROCEDURE — 93005 ELECTROCARDIOGRAM TRACING: CPT | Performed by: ANESTHESIOLOGY

## 2025-04-21 NOTE — ANESTHESIA PRE PROCEDURE
Department of Anesthesiology  Preprocedure Note       Name:  Dorinda Chavarria   Age:  65 y.o.  :  1960                                          MRN:  [id number]         Date:  2025      Surgeon: Surgeon(s):  Akil Adames DO    Procedure: Procedure(s):  RIGHT WRIST CARPAL TUNNEL RELEASE-25    Medications prior to admission:   Prior to Admission medications    Medication Sig Start Date End Date Taking? Authorizing Provider   vitamin D 50 MCG (2000) CAPS capsule Take 1 capsule by mouth daily   Yes ProviderEmory MD   predniSONE (DELTASONE) 5 MG tablet Take 1 tablet by mouth daily 25  Yes ProviderEmory MD   HYDROcodone-acetaminophen (NORCO) 5-325 MG per tablet 7.5 tablets. 25  Yes Emory Magana MD   GINSENG PO Take by mouth   Yes Emory Magana MD   alendronate (FOSAMAX) 70 MG tablet TAKE ONE TABLET BY MOUTH ONCE A WEEK 1/15/25  Yes Emory Magana MD   dicyclomine (BENTYL) 20 MG tablet Take 1 tablet by mouth 4 times daily for 5 days  Patient taking differently: Take 1 tablet by mouth in the morning and at bedtime 3/21/24 4/15/25 Yes Riana Myers MD   azelastine (ASTELIN) 0.1 % nasal spray INSTILL 1 TO 2 SPRAYS IN EACH NOSTRIL 2 TIMES DAILY AS NEEDED FOR RHINITIS AS DIRECTED 23  Yes Reji Borjas, APRN - CNP   MOVANTIK 25 MG TABS tablet TAKE ONE TABLET BY MOUTH EVERY MORNING 22  Yes Robbie Robledo DO   pantoprazole (PROTONIX) 40 MG tablet TAKE ONE TABLET BY MOUTH EVERY DAY  Patient taking differently: in the morning and at bedtime TAKE ONE TABLET BY MOUTH EVERY DAY 22  Yes Robbie Robledo DO   atorvastatin (LIPITOR) 40 MG tablet TAKE ONE TABLET BY MOUTH EVERY DAY  Patient taking differently: 2 tablets 22  Yes Robbie Robledo DO   DULoxetine (CYMBALTA) 20 MG extended release capsule TAKE ONE CAPSULE BY MOUTH DAILY 22  Yes Robbie Robledo DO   budesonide-formoterol (SYMBICORT) 160-4.5 MCG/ACT

## 2025-04-22 ENCOUNTER — ANESTHESIA (OUTPATIENT)
Dept: OPERATING ROOM | Age: 65
End: 2025-04-22
Payer: COMMERCIAL

## 2025-04-22 ENCOUNTER — HOSPITAL ENCOUNTER (OUTPATIENT)
Age: 65
Setting detail: OUTPATIENT SURGERY
Discharge: HOME | End: 2025-04-22
Attending: ORTHOPAEDIC SURGERY | Admitting: ORTHOPAEDIC SURGERY
Payer: COMMERCIAL

## 2025-04-22 VITALS
OXYGEN SATURATION: 99 % | HEART RATE: 92 BPM | DIASTOLIC BLOOD PRESSURE: 79 MMHG | HEIGHT: 64 IN | BODY MASS INDEX: 20.14 KG/M2 | WEIGHT: 118 LBS | RESPIRATION RATE: 16 BRPM | TEMPERATURE: 97.3 F | SYSTOLIC BLOOD PRESSURE: 141 MMHG

## 2025-04-22 DIAGNOSIS — G56.01 RIGHT CARPAL TUNNEL SYNDROME: Primary | ICD-10-CM

## 2025-04-22 PROCEDURE — 3600000012 HC SURGERY LEVEL 2 ADDTL 15MIN: Performed by: ORTHOPAEDIC SURGERY

## 2025-04-22 PROCEDURE — 2500000003 HC RX 250 WO HCPCS

## 2025-04-22 PROCEDURE — 2709999900 HC NON-CHARGEABLE SUPPLY: Performed by: ORTHOPAEDIC SURGERY

## 2025-04-22 PROCEDURE — 6370000000 HC RX 637 (ALT 250 FOR IP): Performed by: ANESTHESIOLOGY

## 2025-04-22 PROCEDURE — 6360000002 HC RX W HCPCS

## 2025-04-22 PROCEDURE — 6360000002 HC RX W HCPCS: Performed by: ORTHOPAEDIC SURGERY

## 2025-04-22 PROCEDURE — 3600000002 HC SURGERY LEVEL 2 BASE: Performed by: ORTHOPAEDIC SURGERY

## 2025-04-22 PROCEDURE — 3700000000 HC ANESTHESIA ATTENDED CARE: Performed by: ORTHOPAEDIC SURGERY

## 2025-04-22 PROCEDURE — 2580000003 HC RX 258: Performed by: ANESTHESIOLOGY

## 2025-04-22 PROCEDURE — 3700000001 HC ADD 15 MINUTES (ANESTHESIA): Performed by: ORTHOPAEDIC SURGERY

## 2025-04-22 PROCEDURE — 7100000011 HC PHASE II RECOVERY - ADDTL 15 MIN: Performed by: ORTHOPAEDIC SURGERY

## 2025-04-22 PROCEDURE — 6360000002 HC RX W HCPCS: Performed by: NURSE ANESTHETIST, CERTIFIED REGISTERED

## 2025-04-22 PROCEDURE — 6360000002 HC RX W HCPCS: Performed by: ANESTHESIOLOGY

## 2025-04-22 PROCEDURE — 7100000010 HC PHASE II RECOVERY - FIRST 15 MIN: Performed by: ORTHOPAEDIC SURGERY

## 2025-04-22 RX ORDER — SODIUM CHLORIDE 9 MG/ML
INJECTION, SOLUTION INTRAVENOUS PRN
Status: DISCONTINUED | OUTPATIENT
Start: 2025-04-22 | End: 2025-04-22 | Stop reason: HOSPADM

## 2025-04-22 RX ORDER — ONDANSETRON 2 MG/ML
INJECTION INTRAMUSCULAR; INTRAVENOUS
Status: DISCONTINUED | OUTPATIENT
Start: 2025-04-22 | End: 2025-04-22 | Stop reason: SDUPTHER

## 2025-04-22 RX ORDER — LIDOCAINE HYDROCHLORIDE 5 MG/ML
INJECTION, SOLUTION INFILTRATION; INTRAVENOUS
Status: COMPLETED | OUTPATIENT
Start: 2025-04-22 | End: 2025-04-22

## 2025-04-22 RX ORDER — BUPIVACAINE HYDROCHLORIDE 2.5 MG/ML
INJECTION, SOLUTION EPIDURAL; INFILTRATION; INTRACAUDAL; PERINEURAL PRN
Status: DISCONTINUED | OUTPATIENT
Start: 2025-04-22 | End: 2025-04-22 | Stop reason: ALTCHOICE

## 2025-04-22 RX ORDER — NALOXONE HYDROCHLORIDE 0.4 MG/ML
INJECTION, SOLUTION INTRAMUSCULAR; INTRAVENOUS; SUBCUTANEOUS PRN
Status: DISCONTINUED | OUTPATIENT
Start: 2025-04-22 | End: 2025-04-22 | Stop reason: HOSPADM

## 2025-04-22 RX ORDER — DIPHENHYDRAMINE HYDROCHLORIDE 50 MG/ML
12.5 INJECTION, SOLUTION INTRAMUSCULAR; INTRAVENOUS
Status: DISCONTINUED | OUTPATIENT
Start: 2025-04-22 | End: 2025-04-22 | Stop reason: HOSPADM

## 2025-04-22 RX ORDER — SODIUM CHLORIDE, SODIUM LACTATE, POTASSIUM CHLORIDE, CALCIUM CHLORIDE 600; 310; 30; 20 MG/100ML; MG/100ML; MG/100ML; MG/100ML
INJECTION, SOLUTION INTRAVENOUS CONTINUOUS
Status: DISCONTINUED | OUTPATIENT
Start: 2025-04-22 | End: 2025-04-22 | Stop reason: HOSPADM

## 2025-04-22 RX ORDER — HYDROCODONE BITARTRATE AND ACETAMINOPHEN 5; 325 MG/1; MG/1
1 TABLET ORAL EVERY 6 HOURS PRN
Status: DISCONTINUED | OUTPATIENT
Start: 2025-04-22 | End: 2025-04-22 | Stop reason: HOSPADM

## 2025-04-22 RX ORDER — SODIUM CHLORIDE 0.9 % (FLUSH) 0.9 %
5-40 SYRINGE (ML) INJECTION EVERY 12 HOURS SCHEDULED
Status: DISCONTINUED | OUTPATIENT
Start: 2025-04-22 | End: 2025-04-22 | Stop reason: HOSPADM

## 2025-04-22 RX ORDER — MIDAZOLAM HYDROCHLORIDE 1 MG/ML
INJECTION, SOLUTION INTRAMUSCULAR; INTRAVENOUS
Status: DISCONTINUED | OUTPATIENT
Start: 2025-04-22 | End: 2025-04-22 | Stop reason: SDUPTHER

## 2025-04-22 RX ORDER — FENTANYL CITRATE 50 UG/ML
INJECTION, SOLUTION INTRAMUSCULAR; INTRAVENOUS
Status: DISCONTINUED | OUTPATIENT
Start: 2025-04-22 | End: 2025-04-22 | Stop reason: SDUPTHER

## 2025-04-22 RX ORDER — PROPOFOL 10 MG/ML
INJECTION, EMULSION INTRAVENOUS
Status: DISCONTINUED | OUTPATIENT
Start: 2025-04-22 | End: 2025-04-22 | Stop reason: SDUPTHER

## 2025-04-22 RX ORDER — KETOROLAC TROMETHAMINE 30 MG/ML
INJECTION, SOLUTION INTRAMUSCULAR; INTRAVENOUS
Status: DISCONTINUED | OUTPATIENT
Start: 2025-04-22 | End: 2025-04-22 | Stop reason: SDUPTHER

## 2025-04-22 RX ORDER — MORPHINE SULFATE 2 MG/ML
1 INJECTION, SOLUTION INTRAMUSCULAR; INTRAVENOUS EVERY 5 MIN PRN
Status: DISCONTINUED | OUTPATIENT
Start: 2025-04-22 | End: 2025-04-22 | Stop reason: HOSPADM

## 2025-04-22 RX ORDER — SODIUM CHLORIDE 0.9 % (FLUSH) 0.9 %
5-40 SYRINGE (ML) INJECTION PRN
Status: DISCONTINUED | OUTPATIENT
Start: 2025-04-22 | End: 2025-04-22 | Stop reason: HOSPADM

## 2025-04-22 RX ORDER — MEPERIDINE HYDROCHLORIDE 25 MG/ML
12.5 INJECTION INTRAMUSCULAR; INTRAVENOUS; SUBCUTANEOUS ONCE
Status: DISCONTINUED | OUTPATIENT
Start: 2025-04-22 | End: 2025-04-22 | Stop reason: HOSPADM

## 2025-04-22 RX ORDER — FENTANYL CITRATE 0.05 MG/ML
50 INJECTION, SOLUTION INTRAMUSCULAR; INTRAVENOUS EVERY 5 MIN PRN
Status: DISCONTINUED | OUTPATIENT
Start: 2025-04-22 | End: 2025-04-22 | Stop reason: HOSPADM

## 2025-04-22 RX ADMIN — PROPOFOL 100 MCG/KG/MIN: 10 INJECTION, EMULSION INTRAVENOUS at 07:42

## 2025-04-22 RX ADMIN — FENTANYL CITRATE 50 MCG: 50 INJECTION, SOLUTION INTRAMUSCULAR; INTRAVENOUS at 07:39

## 2025-04-22 RX ADMIN — FENTANYL CITRATE 50 MCG: 50 INJECTION, SOLUTION INTRAMUSCULAR; INTRAVENOUS at 07:42

## 2025-04-22 RX ADMIN — ONDANSETRON 4 MG: 2 INJECTION, SOLUTION INTRAMUSCULAR; INTRAVENOUS at 07:42

## 2025-04-22 RX ADMIN — HYDROCODONE BITARTRATE AND ACETAMINOPHEN 1 TABLET: 5; 325 TABLET ORAL at 08:31

## 2025-04-22 RX ADMIN — SODIUM CHLORIDE, POTASSIUM CHLORIDE, SODIUM LACTATE AND CALCIUM CHLORIDE: 600; 310; 30; 20 INJECTION, SOLUTION INTRAVENOUS at 07:00

## 2025-04-22 RX ADMIN — LIDOCAINE HYDROCHLORIDE 50 ML: 5 INJECTION, SOLUTION INFILTRATION; INTRAVENOUS at 07:40

## 2025-04-22 RX ADMIN — MIDAZOLAM 2 MG: 1 INJECTION INTRAMUSCULAR; INTRAVENOUS at 07:38

## 2025-04-22 RX ADMIN — KETOROLAC TROMETHAMINE 30 MG: 30 INJECTION, SOLUTION INTRAMUSCULAR; INTRAVENOUS at 07:53

## 2025-04-22 RX ADMIN — CEFAZOLIN SODIUM 2000 MG: 1 POWDER, FOR SOLUTION INTRAMUSCULAR; INTRAVENOUS at 07:36

## 2025-04-22 ASSESSMENT — PAIN - FUNCTIONAL ASSESSMENT
PAIN_FUNCTIONAL_ASSESSMENT: NONE - DENIES PAIN

## 2025-04-22 ASSESSMENT — PAIN DESCRIPTION - FREQUENCY: FREQUENCY: CONTINUOUS

## 2025-04-22 ASSESSMENT — PAIN DESCRIPTION - PAIN TYPE: TYPE: SURGICAL PAIN

## 2025-04-22 ASSESSMENT — PAIN SCALES - GENERAL
PAINLEVEL_OUTOF10: 3
PAINLEVEL_OUTOF10: 2

## 2025-04-22 ASSESSMENT — PAIN DESCRIPTION - ORIENTATION: ORIENTATION: RIGHT

## 2025-04-22 ASSESSMENT — PAIN DESCRIPTION - LOCATION: LOCATION: WRIST;HAND

## 2025-04-22 ASSESSMENT — PAIN DESCRIPTION - DESCRIPTORS: DESCRIPTORS: DISCOMFORT

## 2025-04-22 NOTE — DISCHARGE INSTRUCTIONS
Carpal Tunnel Post-op Instructions    [facility]  [phone]  Akil Adames D.O.          Keep dressing clean and dry for 7 days. Change your operative dressing on post-op day #7. Use bandaids over incision site.     Keep hand elevated as much as possible over the next 24-48 hours. Use ice to operative site about 20 min out of every hour. This helps reduce pain and swelling.    You may shower with soap and water on post-op day #1 but keep your incision/dressing clean and dry. After 7 days when dressing is removed, may shower and let water run over incision site. Do not scrub over area.    Non-weight bearing activities with affected hand until post-op appointment.    No lifting over 5 pounds.    May begin moving fingers immediately.  No heavy lifting.  No pushing or pulling.    Take pain medications as prescribed.   If you anticipate the need for a refill on your medication and the weekend is approaching, please call the office by noon on Friday.  No refills will be called in over the weekend.  DO NOT take Tylenol products while taking prescribed pain medicine.    Resume regular diet and medications (unless otherwise directed by your doctor).    You should have a responsible adult with you for 24 hours.    If you have any questions or concerns, please call the office.       If any problems occur or if you have any further questions, please call your doctor as soon as possible.  If you find that you cannot reach your doctor but feel that your condition needs a doctor’s attention go to the emergency room.

## 2025-04-22 NOTE — ANESTHESIA PROCEDURE NOTES
Peripheral Block    Patient location during procedure: OR  Reason for block: primary anesthetic  Start time: 4/22/2025 7:38 AM  End time: 4/22/2025 7:42 AM  Staffing  Performed: anesthesiologist and resident/CRNA   Anesthesiologist: Chase Ferguson MD  Resident/CRNA: Grazyna Delong APRN - CRNA  Performed by: Grazyna Delong APRN - CRNA  Authorized by: Chase Ferguson MD    Preanesthetic Checklist  Completed: patient identified, IV checked, site marked, risks and benefits discussed, surgical/procedural consents, equipment checked, pre-op evaluation, timeout performed, anesthesia consent given, oxygen available, monitors applied/VS acknowledged, fire risk safety assessment completed and verbalized and blood product R/B/A discussed and consented  Peripheral Block   Patient position: supine  Prep: alcohol swabs  Provider prep: mask  Patient monitoring: cardiac monitor, continuous pulse ox, continuous capnometry, frequent blood pressure checks, IV access, oxygen and responsive to questions  Block type: Reshma block  Laterality: right  Injection technique: single-shot  Guidance: other      Medications Administered  lidocaine injection 0.5% - IntraVENous, Hand Right   50 mL - 4/22/2025 7:40:00 AM

## 2025-04-22 NOTE — ANESTHESIA POSTPROCEDURE EVALUATION
Department of Anesthesiology  Postprocedure Note    Patient: Dorinda Chavarria  MRN: [id number]  YOB: 1960  Date of evaluation: 4/22/2025    Procedure Summary       Date: 04/22/25 Room / Location: [address]    Anesthesia Start: 0734 Anesthesia Stop: 0804    Procedure: RIGHT WRIST CARPAL TUNNEL RELEASE-4/22/25 (Right: Wrist) Diagnosis:       Right carpal tunnel syndrome      (Right carpal tunnel syndrome [G56.01])    Surgeons: Akil Adames DO Responsible Provider: Chase Ferguson MD    Anesthesia Type: MAC ASA Status: 3            Anesthesia Type: MAC    Wendy Phase I: Wendy Score: 10    Wendy Phase II: Wendy Score: 10    Anesthesia Post Evaluation    Patient location during evaluation: PACU  Patient participation: complete - patient participated  Level of consciousness: awake and alert  Airway patency: patent  Nausea & Vomiting: no nausea and no vomiting  Cardiovascular status: hemodynamically stable  Respiratory status: room air and spontaneous ventilation  Hydration status: stable  Pain management: satisfactory to patient    No notable events documented.

## 2025-04-22 NOTE — H&P
biceps/triceps/brachioradialis are equal and symmetric.  Sensory exam C5-T1 are normal bilaterally.      Cardiovascular:  The vascular exam is normal and is well perfused to distal extremities.There are 2+ radial pulses bilaterally, and motor and sensation is intact to median, ulnar, and radial, musclocutaneus, and axillary nerve distribution and grossly symmetric bilaterally.  There is cap refill noted less than two seconds in all digits. There is not edema of the bilateral upper extremities.  There is not varicosities noted in the distal extremities.       Lymph:  Upon palpation,  there is no lymphadenopathy noted in bilateral upper extremities.       Musculoskeletal:  Gait: normal; examination of the nails and digits reveal no cyanosis or clubbing.     Cervical Exam:  On physical exam, Dorinda Chavarria is well-developed, well-nourished, oriented to person, place and time.  her gait is normal.  On evaluation of her cervical spine, she has full range of motion of the cervical spine without pain.  There is no cervical tenderness to palpation.      Shoulder Exam:  On evaluation of her bilaterally upper extremities, her right shoulder has no deformity.     There is not evidence of scapular dyskinesis.  There is not muscle atrophy in shoulder girdle. The range of motion for the Right Shoulder is 160/45/T8 and for the Left shoulder is 160/45/T8.Right shoulder Motor strength is 5/5 in the supraspinatus, 5/5 internal rotation and 5/5 in external rotation, and Left shoulder motor strength 5/5 in supraspinatus, 5/5 in internal rotation, 5/5 in external rotation.      Elbow exam:  Evaluation of the elbow, reveals no signs of swelling or deformity. ROM is 150-0.  There is not instability with varus/valgus stresses.  Motor strength is 5/5 with flexion/extension.      Wrist exam:  Inspection of the bilateral upper extremities, there is no evidence of deformity of the wrist.  ROM Wrist ROM R wrist DF 90, VF 90, L wrist DF 90, VF 90,

## 2025-04-22 NOTE — OP NOTE
Operative Note      Patient: Dorinda Chavarria  YOB: 1960  MRN: [id number]    Date of Procedure: 4/22/2025    Pre-Op Diagnosis Codes:      * Right carpal tunnel syndrome [G56.01]    Post-Op Diagnosis: Same       Procedure(s):  RIGHT WRIST CARPAL TUNNEL RELEASE-4/22/25    Surgeon(s):  Jonathan Velazquez DO    Assistant:   * No surgical staff found *    Anesthesia: [place] Block    Estimated Blood Loss (mL): less than 100     Complications: None    Specimens:   * No specimens in log *    Implants:  * No implants in log *      Drains: * No LDAs found *    Findings:  Infection Present At Time Of Surgery (PATOS) (choose all levels that have infection present):  No infection present  Other Findings: as above    Detailed Description of Procedure:   below    SURGEON: JONATHAN VELAZQUEZ D.O.   ASSISTANT: as above  PREOPERATIVE DIAGNOSIS: Right wrist carpal tunnel syndrome.   POSTOPERATIVE DIAGNOSIS: Right wrist carpal tunnel syndrome.   PROCEDURE: Release transverse carpal ligament, Right wrist.   ANESTHESIA: bb  ESTIMATED BLOOD LOSS: minimal in degree  COMPLICATIONS: None.     Brief Hospital Course: The  patients well  known to Jonathan Velazquez DO's practice with persistent complaints of right wrist/hand pain and numbness. Wrsit and hand pain has failed to be relieved by non-operative conservative measures, and has began affecting daily activities of living. After examination of the patient, review of the EMG, radiologic studies, and appropriate pre-operative risk assessment, Jonathan Velazquez DO recommended right carpal tunnel release,  which the patient was agreeable towards.        OPERATIVE PROCEDURE: The patient was brought to the operating suite and was   given anesthesia. The right arm was identified with a   preoperative time-out, the arm was prepped and draped in sterile fashion, I  outlined incision along the volar side of the wrist just ulnar to the thenar   wrist crease.   I made an approximately 2 to 4-cm incision

## 2025-05-07 ENCOUNTER — OFFICE VISIT (OUTPATIENT)
Dept: ORTHOPEDIC SURGERY | Age: 65
End: 2025-05-07

## 2025-05-07 DIAGNOSIS — Z98.890 S/P CARPAL TUNNEL RELEASE: Primary | ICD-10-CM

## 2025-05-07 PROCEDURE — 99024 POSTOP FOLLOW-UP VISIT: CPT

## 2025-05-07 NOTE — PROGRESS NOTES
Dorinda Chavarria is here for followup after right carpal tunnel surgery. Pain is controlled with current analgesics.  Medication(s) being used: Norco through PM.. The patient notes improvement in the following symptoms:strength, numbness, pain, sensation.  The patient denies fever, wound drainage, increasing redness, pus, increasing pain, increasing swelling. Post op problems reported: none.         Objective:         General :    alert, appears stated age, and cooperative   Sutures:   Sutures out.   Incision:  healing well, no significant drainage, no dehiscence, no significant erythema   Tenderness:  none   Flexion ROM:  full range of motion   Extension ROM:  full range of motion   Effusion:  no         Assessment:        Status post right carpal tunnel surgery. Doing well postoperatively.        Plan:     Sutures removed today.  HEP  Follow up: prn  No heavy lifting, pushing, pulling for 3-4 weeks  Can get incision wet, do not submerge until closed

## 2025-05-13 ENCOUNTER — OFFICE VISIT (OUTPATIENT)
Dept: ENT CLINIC | Age: 65
End: 2025-05-13
Payer: COMMERCIAL

## 2025-05-13 VITALS
BODY MASS INDEX: 20.08 KG/M2 | HEART RATE: 105 BPM | WEIGHT: 117 LBS | DIASTOLIC BLOOD PRESSURE: 81 MMHG | SYSTOLIC BLOOD PRESSURE: 136 MMHG | OXYGEN SATURATION: 93 %

## 2025-05-13 DIAGNOSIS — J34.829 NASAL VALVE COLLAPSE: ICD-10-CM

## 2025-05-13 DIAGNOSIS — K21.9 LARYNGOPHARYNGEAL REFLUX (LPR): Primary | ICD-10-CM

## 2025-05-13 DIAGNOSIS — J34.89 NASAL OBSTRUCTION: ICD-10-CM

## 2025-05-13 DIAGNOSIS — R09.82 POST-NASAL DRAINAGE: ICD-10-CM

## 2025-05-13 PROCEDURE — G8427 DOCREV CUR MEDS BY ELIG CLIN: HCPCS | Performed by: OTOLARYNGOLOGY

## 2025-05-13 PROCEDURE — 1036F TOBACCO NON-USER: CPT | Performed by: OTOLARYNGOLOGY

## 2025-05-13 PROCEDURE — 99214 OFFICE O/P EST MOD 30 MIN: CPT | Performed by: OTOLARYNGOLOGY

## 2025-05-13 PROCEDURE — 3017F COLORECTAL CA SCREEN DOC REV: CPT | Performed by: OTOLARYNGOLOGY

## 2025-05-13 PROCEDURE — G8420 CALC BMI NORM PARAMETERS: HCPCS | Performed by: OTOLARYNGOLOGY

## 2025-05-13 RX ORDER — IPRATROPIUM BROMIDE 42 UG/1
2 SPRAY, METERED NASAL 4 TIMES DAILY
Qty: 15 ML | Refills: 3 | Status: SHIPPED | OUTPATIENT
Start: 2025-05-13

## 2025-05-15 ASSESSMENT — ENCOUNTER SYMPTOMS
COUGH: 0
SORE THROAT: 0
SHORTNESS OF BREATH: 0
VOMITING: 0
RHINORRHEA: 1

## 2025-05-15 NOTE — PROGRESS NOTES
[facility]  Dr. Jose Alejandro French D.O. Ms.Ed        Patient Name:  Dorinda Chavarria  :  1960     CHIEF C/O:    Chief Complaint   Patient presents with   • Follow-up     Sinus FU        HISTORY OBTAINED FROM:  patient    HISTORY OF PRESENT ILLNESS:       Dorinda is a 65 y.o. year old female, here today for follow up of:         History of Present Illness  The patient presents for evaluation of sinus issues.    She reports persistent sinus issues, which have been exacerbated recently due to weather changes. She has not been prescribed ipratropium or Atrovent nasal sprays. She is currently managing her symptoms with nasal drops, which, while effective, induce dryness. She has previously tried Singulair (montelukast) but discontinued its use due to associated fatigue.    Additionally, she experiences a sensation akin to water in her ear canal and reports drainage into her ear following the use of nasal drops.    SOCIAL HISTORY  She does not smoke.    MEDICATIONS  Discontinued: Singulair         Past Medical History:   Diagnosis Date   • Asthma     controlled with inhalers    • BRCA1 negative     Patient thinks she had the genetic testing but don't know the results    • Carotid stenosis     left side   • CRISTHIAN (cerebral atherosclerosis)    • Chronic pain    • Chronic rhinitis    • CRPS (complex regional pain syndrome), lower limb     left foot   • GERD (gastroesophageal reflux disease)    • Hyperlipidemia    • Osteoporosis    • Raynauds syndrome    • Right foot pain     for OR 20    • RSD lower limb     left foot   • Thyroid disease    • Tinnitus      Past Surgical History:   Procedure Laterality Date   • BREAST SURGERY      lump left breast, dense nipple   • BUNIONECTOMY  2011    left   • CARPAL TUNNEL RELEASE Right 2025    RIGHT WRIST CARPAL TUNNEL RELEASE-25 performed by Akil Adames DO at [facility] OR   •  SECTION  1982   • CHOLECYSTECTOMY, LAPAROSCOPIC N/A

## 2025-06-30 ENCOUNTER — TELEPHONE (OUTPATIENT)
Dept: ORTHOPEDIC SURGERY | Age: 65
End: 2025-06-30

## 2025-06-30 ENCOUNTER — OFFICE VISIT (OUTPATIENT)
Dept: ORTHOPEDIC SURGERY | Age: 65
End: 2025-06-30

## 2025-06-30 DIAGNOSIS — Z98.890 S/P CARPAL TUNNEL RELEASE: Primary | ICD-10-CM

## 2025-06-30 PROCEDURE — 99024 POSTOP FOLLOW-UP VISIT: CPT | Performed by: ORTHOPAEDIC SURGERY

## 2025-06-30 RX ORDER — GABAPENTIN 100 MG/1
100 CAPSULE ORAL 3 TIMES DAILY
Qty: 90 CAPSULE | Refills: 1 | Status: SHIPPED | OUTPATIENT
Start: 2025-06-30 | End: 2025-08-29

## 2025-06-30 NOTE — TELEPHONE ENCOUNTER
S/p Right Carpal tunnel release 4/22/25. Patient was seen in office today, 6/30/25 and given script for Gabapentin.   Patient states she got home before she realized that's what she was prescribed and she cannot take it. Not listed as an allergy. Inquired further but patient states she can't take it and wants to know what else he would give her. Advised patient to continue NSAIDS as tolerated. Would forward message to providers.

## 2025-07-01 NOTE — TELEPHONE ENCOUNTER
Left message for patient to return call and let us know why she can't take gabapentin. This will determine what kind, if any, medication can be ordered in its place.

## 2025-07-11 ENCOUNTER — TELEPHONE (OUTPATIENT)
Dept: MAMMOGRAPHY | Age: 65
End: 2025-07-11

## 2025-07-11 NOTE — TELEPHONE ENCOUNTER
Order request faxed to Dr. Alexander for 6 month follow up left breast diagnostic mammogram as recommended from breast imaging performed at [facility] on 2/6/2025. Successful fax confirmation. DANIEL BarrettN, RN -Breast Navigator

## 2025-07-22 ENCOUNTER — TRANSCRIBE ORDERS (OUTPATIENT)
Dept: ADMINISTRATIVE | Age: 65
End: 2025-07-22

## 2025-07-22 DIAGNOSIS — R05.3 CHRONIC COUGH: ICD-10-CM

## 2025-07-22 DIAGNOSIS — R91.1 LUNG NODULE: Primary | ICD-10-CM

## 2025-07-22 DIAGNOSIS — J47.9 BRONCHIECTASIS WITHOUT ACUTE EXACERBATION (HCC): ICD-10-CM

## 2025-07-25 ENCOUNTER — TRANSCRIBE ORDERS (OUTPATIENT)
Dept: ADMINISTRATIVE | Age: 65
End: 2025-07-25

## 2025-07-25 DIAGNOSIS — R92.8 ABNORMAL MAMMOGRAM OF LEFT BREAST: Primary | ICD-10-CM

## (undated) DEVICE — BNDG,ELSTC,MATRIX,STRL,3"X5YD,LF,HOOK&LP: Brand: MEDLINE

## (undated) DEVICE — INTENDED FOR TISSUE SEPARATION, AND OTHER PROCEDURES THAT REQUIRE A SHARP SURGICAL BLADE TO PUNCTURE OR CUT.: Brand: BARD-PARKER ® STAINLESS STEEL BLADES

## (undated) DEVICE — GOWN ISOLATN REG YEL M WT MULTIPLY SIDETIE LEV 2

## (undated) DEVICE — APPLICATOR MEDICATED 26 CC SOLUTION HI LT ORNG CHLORAPREP

## (undated) DEVICE — 6 X 9  1.75MIL 4-WALL LABGUARD: Brand: MINIGRIP COMMERCIAL LLC

## (undated) DEVICE — NEEDLE HYPO 18GA L1.5IN PNK POLYPR HUB S STL REG BVL STR

## (undated) DEVICE — TOWEL,OR,DSP,ST,BLUE,STD,6/PK,12PK/CS: Brand: MEDLINE

## (undated) DEVICE — KENDALL 450 SERIES MONITORING FOAM ELECTRODE - RECTANGULAR SHAPE ( 3/PK): Brand: KENDALL

## (undated) DEVICE — GOWN SIRUS NONREIN XL W/TWL: Brand: MEDLINE INDUSTRIES, INC.

## (undated) DEVICE — INSUFFLATION NEEDLE TO ESTABLISH PNEUMOPERITONEUM.: Brand: INSUFFLATION NEEDLE

## (undated) DEVICE — PREP TRAY 10X5X2: Brand: MEDLINE INDUSTRIES, INC.

## (undated) DEVICE — NEEDLE HYPO 25GA L1.5IN BLU POLYPR HUB S STL REG BVL STR

## (undated) DEVICE — PAD,ABDOMINAL,5"X9",ST,LF,25/BX: Brand: MEDLINE INDUSTRIES, INC.

## (undated) DEVICE — GAUZE,SPONGE,4"X4",16PLY,XRAY,STRL,LF: Brand: MEDLINE

## (undated) DEVICE — 20 ML SYRINGE REGULAR TIP: Brand: MONOJECT

## (undated) DEVICE — DRESSING PETRO W3XL8IN OIL EMUL N ADH GZ KNIT IMPREG CELOS

## (undated) DEVICE — COUNTER NDL 10 COUNT HLD 20 FOAM BLK SGL MAG

## (undated) DEVICE — DRAPE CARM MINI FOR IMAG SYS INSIGHT FLROSCN

## (undated) DEVICE — SYRINGE 20ML LL S/C 50

## (undated) DEVICE — SYRINGE, LUER LOCK, 5ML: Brand: MEDLINE

## (undated) DEVICE — SET ENDO INSTR LAPAROSCOPIC INCISIONAL

## (undated) DEVICE — FORCEPS BX L240CM JAW DIA2.8MM L CAP W/ NDL MIC MESH TOOTH

## (undated) DEVICE — LUBRICANT SURG JELLY ST BACTER TUBE 4.25OZ

## (undated) DEVICE — DRAPE,EXTREMITY,89X128,STERILE: Brand: MEDLINE

## (undated) DEVICE — GLOVE ORTHO 8   MSG9480

## (undated) DEVICE — BANDAGE,GAUZE,BULKEE II,4.5"X4.1YD,STRL: Brand: MEDLINE

## (undated) DEVICE — DOUBLE BASIN SET: Brand: MEDLINE INDUSTRIES, INC.

## (undated) DEVICE — TUBING SUCT 12FR MAL ALUM SHFT FN CAP VENT UNIV CONN W/ OBT

## (undated) DEVICE — PEN: MARKING STD 100/CS: Brand: MEDICAL ACTION INDUSTRIES

## (undated) DEVICE — TROCAR: Brand: KII FIOS FIRST ENTRY

## (undated) DEVICE — MARKER,SKIN,WI/RULER AND LABELS: Brand: MEDLINE

## (undated) DEVICE — ELECTRODE PT RET AD L9FT HI MOIST COND ADH HYDRGEL CORDED

## (undated) DEVICE — GLOVE ORANGE PI 8   MSG9080

## (undated) DEVICE — SYRINGE MED 10ML TRNSLUC BRL PLUNG BLK MRK POLYPR CTRL

## (undated) DEVICE — DRAPE,REIN 53X77,STERILE: Brand: MEDLINE

## (undated) DEVICE — HOOK LOCK LATEX FREE ELASTIC BANDAGE 3INX5YD

## (undated) DEVICE — CONTAINER SPEC COLL 960ML POLYPR TRIANG GRAD INTAKE/OUTPUT

## (undated) DEVICE — PACK PROCEDURE SURG GEN CUST

## (undated) DEVICE — SPONGE GZ 4IN 4IN 4 PLY N WVN AVANT

## (undated) DEVICE — PACK SURG LAP CHOLE CUSTOM

## (undated) DEVICE — GARMENT,MEDLINE,DVT,INT,CALF,MED, GEN2: Brand: MEDLINE

## (undated) DEVICE — MEDI-VAC YANKAUER SUCTION HANDLE W/BULBOUS TIP: Brand: CARDINAL HEALTH

## (undated) DEVICE — SUTURE NONABSORBABLE MONOFILAMENT 4-0 PS-2 18 IN BLU PROLENE 8682H

## (undated) DEVICE — GAUZE,SPONGE,4"X4",8PLY,STRL,LF,10/TRAY: Brand: MEDLINE

## (undated) DEVICE — DRESSING GZ XRFRM 4X4(25/BX 6BX/CS)

## (undated) DEVICE — CAMERA STRYKER 1488 HD GEN

## (undated) DEVICE — APPLIER CLP M/L SHFT DIA5MM 15 LIG LIGAMAX 5

## (undated) DEVICE — CLOTH PREP W7.5XL7.5IN 2% CHG SKIN ALC AND RNS FREE

## (undated) DEVICE — VALVE SUCTION AIR H2O HYDR H2O JET CONN STRL ORCA POD + DISP

## (undated) DEVICE — GOWN,SIRUS,FABRNF,2XL,18/CS: Brand: MEDLINE

## (undated) DEVICE — SKIN AFFIX SURG ADHESIVE 72/CS 0.55ML: Brand: MEDLINE

## (undated) DEVICE — PLUMEPORT LAPAROSCOPIC SMOKE FILTRATION DEVICE: Brand: PLUMEPORT ACTIV

## (undated) DEVICE — GOWN,SIRUS,FABRNF,XL,20/CS: Brand: MEDLINE

## (undated) DEVICE — PMI PTFE COATED LAPAROSCOPIC WIRE L-HOOK 44 CM: Brand: PMI

## (undated) DEVICE — GOWN,SIRUS,FABRNF,L,20/CS: Brand: MEDLINE

## (undated) DEVICE — TROCAR: Brand: KII SLEEVE

## (undated) DEVICE — COVER,LIGHT HANDLE,FLX,2/PK: Brand: MEDLINE INDUSTRIES, INC.

## (undated) DEVICE — CONTAINER SPEC 480ML CLR POLYSTYR 10% NEUT BUFF FRMLN ZN

## (undated) DEVICE — BLOCK BITE 60FR CAREGUARD

## (undated) DEVICE — STANDARD HYPODERMIC NEEDLE,POLYPROPYLENE HUB: Brand: MONOJECT

## (undated) DEVICE — TIBURON EXTREMITY SHEET: Brand: CONVERTORS

## (undated) DEVICE — MASK,FACE,MAXFLUIDPROTECT,SHIELD/ERLPS: Brand: MEDLINE

## (undated) DEVICE — ZIMMER® STERILE DISPOSABLE TOURNIQUET CUFF WITH PLC, DUAL PORT, SINGLE BLADDER, 18 IN. (46 CM)

## (undated) DEVICE — 4-PORT MANIFOLD: Brand: NEPTUNE 2

## (undated) DEVICE — BASIC PACK: Brand: CONVERTORS

## (undated) DEVICE — MEDI-VAC NON-CONDUCTIVE SUCTION TUBING: Brand: CARDINAL HEALTH

## (undated) DEVICE — GOWN,SIRUS,NON REINFRCD,LARGE,SET IN SL: Brand: MEDLINE

## (undated) DEVICE — SOLUTION IRRIG 1000ML 09% SOD CHL USP PIC PLAS CONTAINER

## (undated) DEVICE — KIT BEDSIDE REVITAL OX 500ML

## (undated) DEVICE — SET ENDO INSTR RED YEL LAPAROSCOPIC

## (undated) DEVICE — [HIGH FLOW INSUFFLATOR,  DO NOT USE IF PACKAGE IS DAMAGED,  KEEP DRY,  KEEP AWAY FROM SUNLIGHT,  PROTECT FROM HEAT AND RADIOACTIVE SOURCES.]: Brand: PNEUMOSURE

## (undated) DEVICE — GLOVE ORANGE PI 7 1/2   MSG9075

## (undated) DEVICE — COVER,LIGHT HANDLE,FLX,1/PK: Brand: MEDLINE INDUSTRIES, INC.

## (undated) DEVICE — BLADE,STAINLESS-STEEL,15,STRL,DISPOSABLE: Brand: MEDLINE

## (undated) DEVICE — GAUZE,SPONGE,4"X4",16PLY,STRL,LF,10/TRAY: Brand: MEDLINE